# Patient Record
Sex: FEMALE | Race: WHITE | NOT HISPANIC OR LATINO | Employment: FULL TIME | URBAN - METROPOLITAN AREA
[De-identification: names, ages, dates, MRNs, and addresses within clinical notes are randomized per-mention and may not be internally consistent; named-entity substitution may affect disease eponyms.]

---

## 2017-02-16 ENCOUNTER — OFFICE VISIT (OUTPATIENT)
Dept: OBGYN | Facility: CLINIC | Age: 32
End: 2017-02-16
Payer: COMMERCIAL

## 2017-02-16 VITALS
HEART RATE: 88 BPM | DIASTOLIC BLOOD PRESSURE: 84 MMHG | HEIGHT: 66 IN | TEMPERATURE: 99.3 F | SYSTOLIC BLOOD PRESSURE: 127 MMHG | BODY MASS INDEX: 31.98 KG/M2 | WEIGHT: 199 LBS

## 2017-02-16 DIAGNOSIS — Z32.01 POSITIVE PREGNANCY TEST: ICD-10-CM

## 2017-02-16 DIAGNOSIS — E55.9 VITAMIN D DEFICIENCY: ICD-10-CM

## 2017-02-16 DIAGNOSIS — Z34.01 ENCOUNTER FOR SUPERVISION OF NORMAL FIRST PREGNANCY IN FIRST TRIMESTER: Primary | ICD-10-CM

## 2017-02-16 LAB
BETA HCG QUAL IFA URINE: POSITIVE
ERYTHROCYTE [DISTWIDTH] IN BLOOD BY AUTOMATED COUNT: 12.1 % (ref 10–15)
HCT VFR BLD AUTO: 37.7 % (ref 35–47)
HGB BLD-MCNC: 13.2 G/DL (ref 11.7–15.7)
MCH RBC QN AUTO: 30.2 PG (ref 26.5–33)
MCHC RBC AUTO-ENTMCNC: 35 G/DL (ref 31.5–36.5)
MCV RBC AUTO: 86 FL (ref 78–100)
PLATELET # BLD AUTO: 211 10E9/L (ref 150–450)
RBC # BLD AUTO: 4.37 10E12/L (ref 3.8–5.2)
WBC # BLD AUTO: 8.9 10E9/L (ref 4–11)

## 2017-02-16 PROCEDURE — 84703 CHORIONIC GONADOTROPIN ASSAY: CPT | Performed by: OBSTETRICS & GYNECOLOGY

## 2017-02-16 PROCEDURE — 36415 COLL VENOUS BLD VENIPUNCTURE: CPT | Performed by: OBSTETRICS & GYNECOLOGY

## 2017-02-16 PROCEDURE — 85027 COMPLETE CBC AUTOMATED: CPT | Performed by: OBSTETRICS & GYNECOLOGY

## 2017-02-16 PROCEDURE — 86901 BLOOD TYPING SEROLOGIC RH(D): CPT | Performed by: OBSTETRICS & GYNECOLOGY

## 2017-02-16 PROCEDURE — 99207 ZZC FIRST OB VISIT: CPT | Performed by: OBSTETRICS & GYNECOLOGY

## 2017-02-16 PROCEDURE — 86762 RUBELLA ANTIBODY: CPT | Performed by: OBSTETRICS & GYNECOLOGY

## 2017-02-16 PROCEDURE — 87086 URINE CULTURE/COLONY COUNT: CPT | Performed by: OBSTETRICS & GYNECOLOGY

## 2017-02-16 PROCEDURE — 86850 RBC ANTIBODY SCREEN: CPT | Performed by: OBSTETRICS & GYNECOLOGY

## 2017-02-16 PROCEDURE — 87389 HIV-1 AG W/HIV-1&-2 AB AG IA: CPT | Performed by: OBSTETRICS & GYNECOLOGY

## 2017-02-16 PROCEDURE — 82306 VITAMIN D 25 HYDROXY: CPT | Performed by: OBSTETRICS & GYNECOLOGY

## 2017-02-16 PROCEDURE — 86900 BLOOD TYPING SEROLOGIC ABO: CPT | Performed by: OBSTETRICS & GYNECOLOGY

## 2017-02-16 PROCEDURE — 87340 HEPATITIS B SURFACE AG IA: CPT | Performed by: OBSTETRICS & GYNECOLOGY

## 2017-02-16 PROCEDURE — 86780 TREPONEMA PALLIDUM: CPT | Performed by: OBSTETRICS & GYNECOLOGY

## 2017-02-16 NOTE — NURSING NOTE
"Vitals:    02/16/17 1148 02/16/17 1316   BP: (!) 165/94 127/84   BP Location: Left arm Left arm   Patient Position: Chair Chair   Cuff Size: Adult Regular Adult Regular   Pulse: 127 88   Temp: 99.3  F (37.4  C)    TempSrc: Oral    Weight: 199 lb (90.3 kg)    Height: 5' 5.5\" (1.664 m)        "

## 2017-02-16 NOTE — PATIENT INSTRUCTIONS
If you have any questions regarding your visit, Please contact your care team.     [employer] [facility] Services: [phone]    [facility] CLINIC HOURS TELEPHONE NUMBER   MYRNA Esquivel-    Starla Webb-TYLER Tabor-Medical Assistant   Monday-[place]  8:00a.m-4:45 p.m  Wednesday-[place] 8:00a.m-4:45 p.m.  Thursday-[place]  8:00a.m-4:45 p.m.  Friday-[place]  8:00a.m-4:45 p.m. [facility]  [address]  [place], [place] [address] [phone] ask [facility]  [phone] Fax  Imaging [phone]    [facility] Labor and Delivery  [address] [place] [address] [address].  [place], [place] [address] [phone]    [facility]  [address] [place] [address]  [phone] ask [facility]  [phone] Fax  Imaging [phone]     Urgent Care locations:    [place]        [place] Monday-Friday  5 pm - 9 pm  Saturday and Sunday   9 am - 5 pm    Monday-Friday   11 am - 9 pm  Saturday and Sunday   9 am - 5 pm   [phone]       If you need a medication refill, please contact your pharmacy. Please allow 3 business days for your refill to be completed.  As always, Thank you for trusting us with your healthcare needs!

## 2017-02-16 NOTE — NURSING NOTE
"Chief Complaint   Patient presents with     Confirmation Of Pregnancy     Prenatal Care       Initial BP (!) 165/94 (BP Location: Left arm, Patient Position: Chair, Cuff Size: Adult Regular)  Pulse 127  Temp 99.3  F (37.4  C) (Oral)  Ht 5' 5.5\" (1.664 m)  Wt 199 lb (90.3 kg)  LMP 11/23/2016  Breastfeeding? No  BMI 32.61 kg/m2 Estimated body mass index is 32.61 kg/(m^2) as calculated from the following:    Height as of this encounter: 5' 5.5\" (1.664 m).    Weight as of this encounter: 199 lb (90.3 kg).  Medication Reconciliation: complete   Sharona Pedro CMA      "

## 2017-02-16 NOTE — MR AVS SNAPSHOT
After Visit Summary   2/16/2017    Bernice Garcia    MRN: [id number]           Patient Information     Date Of Birth          1985        Visit Information        Provider Department      2/16/2017 11:30 AM Eddie Richards MD [facility]        Today's Diagnoses     Positive pregnancy test    -  1      Care Instructions                                                        If you have any questions regarding your visit, Please contact your care team.     [facility] Services: [phone]    [facility] CLINIC HOURS TELEPHONE NUMBER   MYRNA Esquivel-    Starla Webb-TYLER Tabor-Medical Assistant   Monday-[place]  8:00a.m-4:45 p.m  Wednesday-[place] 8:00a.m-4:45 p.m.  Thursday-[place]  8:00a.m-4:45 p.m.  Friday-[place]  8:00a.m-4:45 p.m. [facility]  [address]  [place], [place] [address] [phone] ask [facility]  [phone] Fax  Imaging [phone]    [facility] Labor and Delivery  [address] [place] [address] Dr.  [place], [place] [address] [phone]    [facility]  [address] [facility] [place] [address] [phone] ask [facility]  [phone] Fax  Imaging [phone]     Urgent Care locations:    [facility] Monday-Friday  5 pm - 9 pm  Saturday and Sunday   9 am - 5 pm    Monday-Friday   11 am - 9 pm  Saturday and Sunday   9 am - 5 pm   [phone]       If you need a medication refill, please contact your pharmacy. Please allow 3 business days for your refill to be completed.  As always, Thank you for trusting us with your healthcare needs!          Follow-ups after your visit        Who to contact     If you have questions or need follow up information about today's clinic visit or your schedule please contact [facility] directly at [phone].  Normal or  "non-critical lab and imaging results will be communicated to you by MyChart, letter or phone within 4 business days after the clinic has received the results. If you do not hear from us within 7 days, please contact the clinic through BrightSc[employer] or phone. If you have a critical or abnormal lab result, we will notify you by phone as soon as possible.  Submit refill requests through Futurefleet or call your pharmacy and they will forward the refill request to us. Please allow 3 business days for your refill to be completed.          Additional Information About Your Visit        Future[employer]et Information     Future[employer]et lets you send messages to your doctor, view your test results, renew your prescriptions, schedule appointments and more. To sign up, go to [email or web address] . Click on \"Log in\" on the left side of the screen, which will take you to the Welcome page. Then click on \"Sign up Now\" on the right side of the page.     You will be asked to enter the access code listed below, as well as some personal information. Please follow the directions to create your username and password.     Your access code is: [email or web address]  Expires: 2017 11:53 AM     Your access code will  in 90 days. If you need help or a new code, please call your [place] clinic or [phone].        Care EveryWhere ID     This is your Care EveryWhere ID. This could be used by other organizations to access your [place] medical records  [id number]        Your Vitals Were     Pulse Temperature Height Last Period Breastfeeding? BMI (Body Mass Index)    127 99.3  F (37.4  C) (Oral) 5' 5.5\" (1.664 m) 2016 (Approximate) No 32.61 kg/m2       Blood Pressure from Last 3 Encounters:   17 (!) 165/94   14 (!) 130/92   14 128/80    Weight from Last 3 Encounters:   17 199 lb (90.3 kg)   14 180 lb (81.6 kg)   14 180 lb (81.6 kg)              We Performed the Following     Beta HCG qual IFA urine        Primary Care " Provider Office Phone # Fax #    Martínez Reynoso PA-C [phone]       [facility] [address] [facility] [address]        Thank you!     Thank you for choosing [facility]  for your care. Our goal is always to provide you with excellent care. Hearing back from our patients is one way we can continue to improve our services. Please take a few minutes to complete the written survey that you may receive in the mail after your visit with us. Thank you!             Your Updated Medication List - Protect others around you: Learn how to safely use, store and throw away your medicines at www.disposemymeds.org.          This list is accurate as of: 2/16/17 11:53 AM.  Always use your most recent med list.                   Brand Name Dispense Instructions for use    doxylamine 25 MG Tabs tablet    UNISOM     Take 12.5 mg by mouth nightly as needed       PRENA1 PO

## 2017-02-17 PROBLEM — Z28.39 RUBELLA NON-IMMUNE STATUS, ANTEPARTUM: Status: ACTIVE | Noted: 2017-02-17

## 2017-02-17 PROBLEM — Z34.00 SUPERVISION OF NORMAL FIRST PREGNANCY: Status: ACTIVE | Noted: 2017-02-17

## 2017-02-17 PROBLEM — O09.899 RUBELLA NON-IMMUNE STATUS, ANTEPARTUM: Status: ACTIVE | Noted: 2017-02-17

## 2017-02-17 PROBLEM — E55.9 VITAMIN D DEFICIENCY: Status: ACTIVE | Noted: 2017-02-17

## 2017-02-17 LAB
ABO + RH BLD: NORMAL
ABO + RH BLD: NORMAL
BACTERIA SPEC CULT: NORMAL
BLD GP AB SCN SERPL QL: NORMAL
BLOOD BANK CMNT PATIENT-IMP: NORMAL
DEPRECATED CALCIDIOL+CALCIFEROL SERPL-MC: 21 UG/L (ref 20–75)
HBV SURFACE AG SERPL QL IA: NONREACTIVE
HIV 1+2 AB+HIV1 P24 AG SERPL QL IA: NORMAL
MICRO REPORT STATUS: NORMAL
RUBV IGG SERPL IA-ACNC: 9 IU/ML
SPECIMEN EXP DATE BLD: NORMAL
SPECIMEN SOURCE: NORMAL
T PALLIDUM IGG+IGM SER QL: NEGATIVE

## 2017-02-17 NOTE — PROGRESS NOTES
Bernice Garcia is a 31 year old year old G 1 P 0 who presents for pregnancy confirmation and an initial obstetrical visit.  Referred by self.    Currently experiencing normal pregnancy symptoms without particular problems including pain, bleeding, marked vomiting or weight loss except: no exceptions.  This was a planned pregnancy.  Here today alone.   Additional concerns: none.     ROS:     Systems reviewed include constitutional; breast;                 cardiac; respiratory; gastrointestinal; genitourinary;                                musculoskeletal; integumentary; psychological;                                hematologic/lymphatic and endocrine.                  These systems were negative for significant symptoms except                 for the following: none and see above HPI.    Past medical, obstetrical, surgical, family and social history reviewed and as noted or updated in chart.     Allergies, meds and supplements are as noted or updated in chart.                    Episode OB dating, demographic and history forms completed or reviewed.    EXAM:  VS as noted. BMI- Body mass index is 32.61 kg/(m^2).    Relatively recent normal general exam- not repeated now.       ASSESSMENT: (Z34.01) Encounter for supervision of normal first pregnancy in first trimester  (primary encounter diagnosis)  Comment:   Plan: CBC WITH PLATELETS, HIV Antigen Antibody Combo,        Rubella Antibody IgG Quantitative, Hepatitis B         surface antigen, Anti Treponema, ABO/RH TYPE         AND SCREEN, Urine Culture Aerobic Bacterial, US        OB <14 Weeks w Transvaginal Single, Vitamin D         Deficiency            (Z32.01) Positive pregnancy test  Comment:   Plan: Beta HCG qual IFA urine, CBC WITH PLATELETS,         HIV Antigen Antibody Combo, Rubella Antibody         IgG Quantitative, Hepatitis B surface antigen,         Anti Treponema, ABO/RH TYPE AND SCREEN, Urine         Culture Aerobic Bacterial            (E55.9)  Vitamin D deficiency  Comment:   Plan: cholecalciferol (VITAMIN D3) 38238 UNITS         capsule               PLAN:  Anticipatory guidance as noted. Symptoms, problems and concerns reviewed. Recommended weight gain discussed. Problem list initiated. Check u/s now. Orders as noted. Observe BP- usually ok. RTC in 4 weeks. First Pap explained and due next along with discuss special screening tests next.    Eddie Richards MD

## 2017-04-05 ENCOUNTER — PRENATAL OFFICE VISIT (OUTPATIENT)
Dept: OBGYN | Facility: CLINIC | Age: 32
End: 2017-04-05
Payer: COMMERCIAL

## 2017-04-05 VITALS
TEMPERATURE: 98.6 F | BODY MASS INDEX: 33.1 KG/M2 | WEIGHT: 202 LBS | DIASTOLIC BLOOD PRESSURE: 89 MMHG | SYSTOLIC BLOOD PRESSURE: 147 MMHG | HEART RATE: 107 BPM

## 2017-04-05 DIAGNOSIS — O09.899 RUBELLA NON-IMMUNE STATUS, ANTEPARTUM: ICD-10-CM

## 2017-04-05 DIAGNOSIS — Z28.39 RUBELLA NON-IMMUNE STATUS, ANTEPARTUM: ICD-10-CM

## 2017-04-05 DIAGNOSIS — E55.9 VITAMIN D DEFICIENCY: ICD-10-CM

## 2017-04-05 DIAGNOSIS — Z34.02 ENCOUNTER FOR SUPERVISION OF NORMAL FIRST PREGNANCY IN SECOND TRIMESTER: Primary | ICD-10-CM

## 2017-04-05 PROCEDURE — 99000 SPECIMEN HANDLING OFFICE-LAB: CPT | Performed by: OBSTETRICS & GYNECOLOGY

## 2017-04-05 PROCEDURE — 99207 ZZC PRENATAL VISIT: CPT | Performed by: OBSTETRICS & GYNECOLOGY

## 2017-04-05 PROCEDURE — 81511 FTL CGEN ABNOR FOUR ANAL: CPT | Mod: 90 | Performed by: OBSTETRICS & GYNECOLOGY

## 2017-04-05 PROCEDURE — 87624 HPV HI-RISK TYP POOLED RSLT: CPT | Performed by: OBSTETRICS & GYNECOLOGY

## 2017-04-05 PROCEDURE — G0145 SCR C/V CYTO,THINLAYER,RESCR: HCPCS | Performed by: OBSTETRICS & GYNECOLOGY

## 2017-04-05 PROCEDURE — 36415 COLL VENOUS BLD VENIPUNCTURE: CPT | Performed by: OBSTETRICS & GYNECOLOGY

## 2017-04-05 PROCEDURE — 82306 VITAMIN D 25 HYDROXY: CPT | Performed by: OBSTETRICS & GYNECOLOGY

## 2017-04-05 NOTE — LETTER
April 17, 2017    Bernice Garcia  [address] [place] [address]    Dear Bernice,  We are happy to inform you that your PAP smear result from 4/5/17 is normal.  We are now able to do a follow up test on PAP smears. The DNA test is for HPV (Human Papilloma Virus). Cervical cancer is closely linked with certain types of HPV. Your result showed no evidence of high risk HPV.  Therefore we recommend you return in 3 years for your next pap smear.  You will still need to return to the clinic every year for an annual exam and other preventive tests.  Please contact the clinic at [phone] with any questions.  Sincerely,    Eddie Richards MD/james

## 2017-04-05 NOTE — MR AVS SNAPSHOT
After Visit Summary   4/5/2017    Bernice Garcia    MRN: [id number]           Patient Information     Date Of Birth          1985        Visit Information        Provider Department      4/5/2017 1:45 PM Eddie Richards MD [facility]        Today's Diagnoses     Rubella non-immune status, antepartum        Encounter for supervision of normal first pregnancy in second trimester          Care Instructions                                                        If you have any questions regarding your visit, Please contact your care team.     [facility] Access Services: [phone]    [facility] CLINIC HOURS TELEPHONE NUMBER   Eddie Richards M.D.      [place]-    Starla Webb-TYLER Curryi-Medical Assistant   Monday-[place]  8:00a.m-4:45 p.m  Wednesday-[place] 8:00a.m-4:45 p.m.  Thursday-[place]  8:00a.m-4:45 p.m.  Friday-[place]  8:00a.m-4:45 p.m. [facility]  [address] [place] [address] [place] [address]  [phone] ask for [facility]  [phone] Fax  Imaging [phone]    [facility] Labor and Delivery  [address] [place] [address] [place] [address]  [phone]    [facility]  [address] [place] [address]  [phone] ask for [facility]  [phone] Fax  Imaging [phone]     Urgent Care locations:    [facility] Monday-Friday  5 pm - 9 pm  Saturday and Sunday   9 am - 5 pm    Monday-Friday   11 am - 9 pm  Saturday and Sunday   9 am - 5 pm   [phone]       If you need a medication refill, please contact your pharmacy. Please allow 3 business days for your refill to be completed.  As always, Thank you for trusting us with your healthcare needs!          Follow-ups after your visit        Who to contact     If you have questions or need follow up information about today's clinic visit or your schedule please  "contact [facility] directly at [phone].  Normal or non-critical lab and imaging results will be communicated to you by MyChart, letter or phone within 4 business days after the clinic has received the results. If you do not hear from us within 7 days, please contact the clinic through MyChart or phone. If you have a critical or abnormal lab result, we will notify you by phone as soon as possible.  Submit refill requests through CloudStrategies or call your pharmacy and they will forward the refill request to us. Please allow 3 business days for your refill to be completed.          Additional Information About Your Visit        [employer] Information     CloudStrategies lets you send messages to your doctor, view your test results, renew your prescriptions, schedule appointments and more. To sign up, go to [email or web address] . Click on \"Log in\" on the left side of the screen, which will take you to the Welcome page. Then click on \"Sign up Now\" on the right side of the page.     You will be asked to enter the access code listed below, as well as some personal information. Please follow the directions to create your username and password.     Your access code is: [email or web address]  Expires: 2017 12:53 PM     Your access code will  in 90 days. If you need help or a new code, please call your [place] clinic or [phone].        Care EveryWhere ID     This is your Care EveryWhere ID. This could be used by other organizations to access your [place] medical records  [id number]        Your Vitals Were     Pulse Temperature Last Period Breastfeeding? BMI (Body Mass Index)       107 98.6  F (37  C) (Oral) 2016 (Exact Date) No 33.1 kg/m2        Blood Pressure from Last 3 Encounters:   17 147/89   17 127/84   14 (!) 130/92    Weight from Last 3 Encounters:   17 202 lb (91.6 kg)   17 199 lb (90.3 kg)   14 180 lb (81.6 kg)              Today, you had the following  "    No orders found for display       Primary Care Provider Office Phone # Fax #    Martínez Reynoso PA-C [phone]       [facility] [address] [facility] [address]        Thank you!     Thank you for choosing [facility]  for your care. Our goal is always to provide you with excellent care. Hearing back from our patients is one way we can continue to improve our services. Please take a few minutes to complete the written survey that you may receive in the mail after your visit with us. Thank you!             Your Updated Medication List - Protect others around you: Learn how to safely use, store and throw away your medicines at www.disposemymeds.org.          This list is accurate as of: 4/5/17  4:21 PM.  Always use your most recent med list.                   Brand Name Dispense Instructions for use    cholecalciferol 00788 UNITS capsule    VITAMIN D3    8 capsule    Take 1 capsule (50,000 Units) by mouth every 7 days       doxylamine 25 MG Tabs tablet    UNISOM     Take 12.5 mg by mouth nightly as needed       PRENA1 PO

## 2017-04-05 NOTE — NURSING NOTE
"Chief Complaint   Patient presents with     Prenatal Care     OBV 18w6d       Initial /89 (BP Location: Left arm, Patient Position: Chair, Cuff Size: Adult Regular)  Pulse 107  Temp 98.6  F (37  C) (Oral)  Wt 202 lb (91.6 kg)  LMP 11/23/2016 (Exact Date)  Breastfeeding? No  BMI 33.1 kg/m2 Estimated body mass index is 33.1 kg/(m^2) as calculated from the following:    Height as of 2/16/17: 5' 5.5\" (1.664 m).    Weight as of this encounter: 202 lb (91.6 kg).  Medication Reconciliation: complete   Sharona Pedro CMA      "

## 2017-04-05 NOTE — PATIENT INSTRUCTIONS
If you have any questions regarding your visit, Please contact your care team.     [employer] [facility] Services: [phone]    [facility] CLINIC HOURS TELEPHONE NUMBER   MYRNA Esquivel-    Starla Webb-TYLER Tabor-Medical Assistant   Monday-[place]  8:00a.m-4:45 p.m  Wednesday-[place] 8:00a.m-4:45 p.m.  Thursday-[place]  8:00a.m-4:45 p.m.  Friday-[place]  8:00a.m-4:45 p.m. [facility]  [address]  [place], [place] [address] [phone] ask [facility]  [phone] Fax  Imaging [phone]    [facility] Labor and Delivery  [address] [place] [address] Dr.  [place], [place] [address] [phone]    [facility]  [address] [place] [address]  [phone] ask [facility]  [phone] Fax  Imaging [phone]     Urgent Care locations:    [place]        [place] Monday-Friday  5 pm - 9 pm  Saturday and Sunday   9 am - 5 pm    Monday-Friday   11 am - 9 pm  Saturday and Sunday   9 am - 5 pm   [phone]       If you need a medication refill, please contact your pharmacy. Please allow 3 business days for your refill to be completed.  As always, Thank you for trusting us with your healthcare needs!

## 2017-04-06 LAB — DEPRECATED CALCIDIOL+CALCIFEROL SERPL-MC: 53 UG/L (ref 20–75)

## 2017-04-06 NOTE — PROGRESS NOTES
Doing well. No signif signs, symptoms or concerns except GERD. Decided against doing an early u/s here due to uncertainty on her insurance coverage but did have a non-medical u/s elsewhere and reports a boy. Taking Vit D Rx.   Pap/HRHPV obtained and exam neg. BP mildly elevated today too.   Discussed special diagnostic and screening tests and plans (y = yes, n = no/declined): quad scr = y, survey u/s = y, Level 2 survey u/s with MFM = n, CF carrier scr = n, hemoglobinopathy or thalassemia scr= n, SMA, fragile X  and other genetic scr= n, 1st trimester scr with NT and later AFP or with cell free fetal DNA and later AFP = n, genetic amnio/CVS = n.  Advice as per Checklist update. Discussed condition, tests and care plan including RBA. Problem list updated. Survey u/s next.  Eddie Richards MD

## 2017-04-07 LAB
# FETUSES US: NORMAL
AFP ADJ MOM AMN: 0.86
AFP SERPL-MCNC: 50 NG/ML
AGE - REPORTED: 31.1
COPATH REPORT: NORMAL
DATING METHOD: NORMAL
DIABETIC AT CONCEPTION: NO
FAMILY MEMBER DISEASES HX: NO
FAMILY MEMBER DISEASES HX: NO
GA METHOD: NORMAL
GA: 21.43 WK
HCG MOM SERPL: 0.95
HCG SERPL-ACNC: NORMAL M[IU]/ML
HX OF HEREDITARY DISORDERS: NO
IDDM PATIENT QL: NO
INHIBIN A MOM SERPL: 0.67
INHIBIN A SERPL-MCNC: 123
INTEGRATED SCN PATIENT-IMP: NORMAL
LMP START DATE: NORMAL
PAP: NORMAL
PATHOLOGY STUDY: NORMAL
PREV HX CHROMOSOME ABNORMALITY: NO
SPECIMEN DRAWN SERPL: NORMAL
TWINS: NORMAL
U ESTRIOL MOM SERPL: 0.9
U ESTRIOL SERPL-MCNC: 2.14 NG/ML

## 2017-04-11 LAB
FINAL DIAGNOSIS: NORMAL
HPV HR 12 DNA CVX QL NAA+PROBE: NEGATIVE
HPV16 DNA SPEC QL NAA+PROBE: NEGATIVE
HPV18 DNA SPEC QL NAA+PROBE: NEGATIVE
SPECIMEN DESCRIPTION: NORMAL

## 2017-04-13 ENCOUNTER — RADIANT APPOINTMENT (OUTPATIENT)
Dept: ULTRASOUND IMAGING | Facility: CLINIC | Age: 32
End: 2017-04-13
Attending: OBSTETRICS & GYNECOLOGY
Payer: COMMERCIAL

## 2017-04-13 DIAGNOSIS — Z34.02 ENCOUNTER FOR SUPERVISION OF NORMAL FIRST PREGNANCY IN SECOND TRIMESTER: ICD-10-CM

## 2017-04-13 PROCEDURE — 76805 OB US >/= 14 WKS SNGL FETUS: CPT | Performed by: RADIOLOGY

## 2017-04-18 ENCOUNTER — TELEPHONE (OUTPATIENT)
Dept: OBGYN | Facility: CLINIC | Age: 32
End: 2017-04-18

## 2017-04-18 NOTE — TELEPHONE ENCOUNTER
[facility] Call Center    Phone Message    Name of Caller: Bernice    Phone Number: Home number on file [phone] (home)    Best time to return call: any    May a detailed message be left on voicemail: yes    Relation to patient: Self    Reason for Call: Other: Patient is calling to speak with clinic about pap results. Please advise.     Action Taken: Message routed to:  [facility] p [phone]

## 2017-04-19 NOTE — TELEPHONE ENCOUNTER
Notes Recorded by Maria L Steele RN on 4/14/2017 at 8:44 AM  Please send normal pap letter (49354) for pap in 3 year(s). HM and PL updated.   Thanks!  Maria L Steele RN, BSN - Pap Tracking    Will route to TYLER Lisa for review & orders. Tracey Nye RN, BAN

## 2017-04-19 NOTE — TELEPHONE ENCOUNTER
Left message for patient to return my call to [phone].    Maria L Steele, JEAN-CLAUDEN, RN  Pap Tracking Nurse

## 2017-05-11 ENCOUNTER — PRENATAL OFFICE VISIT (OUTPATIENT)
Dept: OBGYN | Facility: CLINIC | Age: 32
End: 2017-05-11
Payer: COMMERCIAL

## 2017-05-11 VITALS
TEMPERATURE: 99.2 F | DIASTOLIC BLOOD PRESSURE: 91 MMHG | WEIGHT: 206 LBS | SYSTOLIC BLOOD PRESSURE: 134 MMHG | HEART RATE: 98 BPM | BODY MASS INDEX: 33.76 KG/M2

## 2017-05-11 DIAGNOSIS — Z34.02 ENCOUNTER FOR SUPERVISION OF NORMAL FIRST PREGNANCY IN SECOND TRIMESTER: ICD-10-CM

## 2017-05-11 DIAGNOSIS — O09.899 RUBELLA NON-IMMUNE STATUS, ANTEPARTUM: ICD-10-CM

## 2017-05-11 DIAGNOSIS — Z28.39 RUBELLA NON-IMMUNE STATUS, ANTEPARTUM: ICD-10-CM

## 2017-05-11 PROBLEM — E55.9 VITAMIN D DEFICIENCY: Status: RESOLVED | Noted: 2017-02-17 | Resolved: 2017-05-11

## 2017-05-11 LAB
GLUCOSE 1H P 50 G GLC PO SERPL-MCNC: 110 MG/DL (ref 60–129)
HGB BLD-MCNC: 11.5 G/DL (ref 11.7–15.7)

## 2017-05-11 PROCEDURE — 99207 ZZC PRENATAL VISIT: CPT | Performed by: OBSTETRICS & GYNECOLOGY

## 2017-05-11 PROCEDURE — 82950 GLUCOSE TEST: CPT | Performed by: OBSTETRICS & GYNECOLOGY

## 2017-05-11 PROCEDURE — 36415 COLL VENOUS BLD VENIPUNCTURE: CPT | Performed by: OBSTETRICS & GYNECOLOGY

## 2017-05-11 PROCEDURE — 00000218 ZZHCL STATISTIC OBHBG - HEMOGLOBIN: Performed by: OBSTETRICS & GYNECOLOGY

## 2017-05-11 NOTE — Clinical Note
I reviewed quad screen report again from lab and the EROS that they used was incorrectly listed as 8/13 instead of 8/31. Please have Lab re-calculate using the 8/31 EROS that is based on known conception date and amend the report.

## 2017-05-11 NOTE — PROGRESS NOTES
No signif signs, symptoms or concerns. Advice appropriate to gestational age reviewed including pertinent Checklist items. Discussed condition, tests and care plan including RBA. Problem list updated. Follow-up u/s to complete survey next.  A/P:  Bernice was seen today for prenatal care.    Diagnoses and all orders for this visit:    Rubella non-immune status, antepartum    Encounter for supervision of normal first pregnancy in second trimester  -     Glucose tolerance gest screen 1 hour  -     OB hemoglobin  -     US OB Limited One Or More Fetuses; Future        Eddie Richards MD

## 2017-05-11 NOTE — PATIENT INSTRUCTIONS
If you have any questions regarding your visit, Please contact your care team.     [facility] Services: [phone]    [facility] CLINIC HOURS TELEPHONE NUMBER   MYRNA Esquivel-    Starla Webb-TYLER Tabor-Medical Assistant   Monday-[place]  8:00a.m-4:45 p.m  Wednesday-[place] 8:00a.m-4:45 p.m.  Thursday-[place]  8:00a.m-4:45 p.m.  Friday-[place]  8:00a.m-4:45 p.m. [facility]  [address]  [place], [place] [address] [phone] ask [facility]  [phone] Fax  Imaging [id number]-[phone]    [facility] Labor and Delivery  [address] [place] [address] Dr.  [place], [place] [address] [phone]    [facility]  [address] [place] [address]  [phone] ask [facility]  [phone] Fax  Imaging [phone]     Urgent Care locations:    [place]        [place] Monday-Friday  5 pm - 9 pm  Saturday and Sunday   9 am - 5 pm    Monday-Friday   11 am - 9 pm  Saturday and Sunday   9 am - 5 pm   [phone]       If you need a medication refill, please contact your pharmacy. Please allow 3 business days for your refill to be completed.  As always, Thank you for trusting us with your healthcare needs!

## 2017-05-11 NOTE — NURSING NOTE
"Chief Complaint   Patient presents with     Prenatal Care     OBV  24 weeks       Initial BP (!) 134/91 (BP Location: Left arm, Patient Position: Chair, Cuff Size: Adult Regular)  Pulse 98  Temp 99.2  F (37.3  C) (Oral)  Wt 206 lb (93.4 kg)  LMP 11/23/2016 (Exact Date)  Breastfeeding? No  BMI 33.76 kg/m2 Estimated body mass index is 33.76 kg/(m^2) as calculated from the following:    Height as of 2/16/17: 5' 5.5\" (1.664 m).    Weight as of this encounter: 206 lb (93.4 kg).  Medication Reconciliation: complete   Sharona Pedro CMA      "

## 2017-05-11 NOTE — MR AVS SNAPSHOT
After Visit Summary   5/11/2017    Bernice Garcia    MRN: [id number]           Patient Information     Date Of Birth          1985        Visit Information        Provider Department      5/11/2017 4:30 PM Eddie Richards MD [facility]        Today's Diagnoses     Rubella non-immune status, antepartum        Encounter for supervision of normal first pregnancy in second trimester          Care Instructions                                                        If you have any questions regarding your visit, Please contact your care team.     [facility] Access Services: [phone]    [facility] CLINIC HOURS TELEPHONE NUMBER   Eddie Richards M.D.      Hellen-    Starla Webb-TYLER Curryi-Medical Assistant   Monday-[place]  8:00a.m-4:45 p.m  Wednesday-[place] 8:00a.m-4:45 p.m.  Thursday-[place]  8:00a.m-4:45 p.m.  Friday-[place]  8:00a.m-4:45 p.m. [facility]  [address] [place] [address] [place] [address]  [phone] ask for [facility]  [phone] Fax  Imaging [phone]    [facility]  [address] [place] [address] [place] [address]  [phone]    [facility]  [address] [place] [address]  [phone] ask for [facility]  [phone] Fax  Imaging [phone]     Urgent Care locations:    [facility] Monday-Friday  5 pm - 9 pm  Saturday and Sunday   9 am - 5 pm    Monday-Friday   11 am - 9 pm  Saturday and Sunday   9 am - 5 pm   [phone]       If you need a medication refill, please contact your pharmacy. Please allow 3 business days for your refill to be completed.  As always, Thank you for trusting us with your healthcare needs!          Follow-ups after your visit        Who to contact     If you have questions or need follow up information about today's clinic visit or your schedule  "please contact [facility] directly at [phone].  Normal or non-critical lab and imaging results will be communicated to you by MyChart, letter or phone within 4 business days after the clinic has received the results. If you do not hear from us within 7 days, please contact the clinic through MyChart or phone. If you have a critical or abnormal lab result, we will notify you by phone as soon as possible.  Submit refill requests through frestyl or call your pharmacy and they will forward the refill request to us. Please allow 3 business days for your refill to be completed.          Additional Information About Your Visit        angelcamharSympoz Information     frestyl lets you send messages to your doctor, view your test results, renew your prescriptions, schedule appointments and more. To sign up, go to [email or web address] . Click on \"Log in\" on the left side of the screen, which will take you to the Welcome page. Then click on \"Sign up Now\" on the right side of the page.     You will be asked to enter the access code listed below, as well as some personal information. Please follow the directions to create your username and password.     Your access code is: [email or web address]  Expires: 2017 12:53 PM     Your access code will  in 90 days. If you need help or a new code, please call your [place] clinic or [phone].        Care EveryWhere ID     This is your Care EveryWhere ID. This could be used by other organizations to access your [place] medical records  [id number]        Your Vitals Were     Pulse Temperature Last Period Breastfeeding? BMI (Body Mass Index)       98 99.2  F (37.3  C) (Oral) 2016 (Exact Date) No 33.76 kg/m2        Blood Pressure from Last 3 Encounters:   17 (!) 134/91   17 147/89   17 127/84    Weight from Last 3 Encounters:   17 206 lb (93.4 kg)   17 202 lb (91.6 kg)   17 199 lb (90.3 kg)              Today, you had the " following     No orders found for display       Primary Care Provider Office Phone # Fax #    Martínez Reynoso PA-C [phone]       [facility] [address] [facility] [address]        Thank you!     Thank you for choosing [facility]  for your care. Our goal is always to provide you with excellent care. Hearing back from our patients is one way we can continue to improve our services. Please take a few minutes to complete the written survey that you may receive in the mail after your visit with us. Thank you!             Your Updated Medication List - Protect others around you: Learn how to safely use, store and throw away your medicines at www.disposemymeds.org.          This list is accurate as of: 5/11/17  4:41 PM.  Always use your most recent med list.                   Brand Name Dispense Instructions for use    cholecalciferol 41059 UNITS capsule    VITAMIN D3    8 capsule    Take 1 capsule (50,000 Units) by mouth every 7 days       doxylamine 25 MG Tabs tablet    UNISOM     Take 12.5 mg by mouth nightly as needed       PRENA1 PO

## 2017-05-15 ENCOUNTER — RADIANT APPOINTMENT (OUTPATIENT)
Dept: ULTRASOUND IMAGING | Facility: CLINIC | Age: 32
End: 2017-05-15
Attending: OBSTETRICS & GYNECOLOGY
Payer: COMMERCIAL

## 2017-05-15 DIAGNOSIS — Z34.02 ENCOUNTER FOR SUPERVISION OF NORMAL FIRST PREGNANCY IN SECOND TRIMESTER: ICD-10-CM

## 2017-05-15 PROCEDURE — 76815 OB US LIMITED FETUS(S): CPT | Performed by: RADIOLOGY

## 2017-06-09 ENCOUNTER — PRENATAL OFFICE VISIT (OUTPATIENT)
Dept: OBGYN | Facility: CLINIC | Age: 32
End: 2017-06-09
Payer: COMMERCIAL

## 2017-06-09 VITALS
OXYGEN SATURATION: 100 % | DIASTOLIC BLOOD PRESSURE: 82 MMHG | HEIGHT: 66 IN | TEMPERATURE: 98.5 F | BODY MASS INDEX: 33.91 KG/M2 | HEART RATE: 93 BPM | SYSTOLIC BLOOD PRESSURE: 133 MMHG | WEIGHT: 211 LBS

## 2017-06-09 DIAGNOSIS — O09.899 RUBELLA NON-IMMUNE STATUS, ANTEPARTUM: ICD-10-CM

## 2017-06-09 DIAGNOSIS — Z34.03 ENCOUNTER FOR SUPERVISION OF NORMAL FIRST PREGNANCY IN THIRD TRIMESTER: Primary | ICD-10-CM

## 2017-06-09 DIAGNOSIS — Z28.39 RUBELLA NON-IMMUNE STATUS, ANTEPARTUM: ICD-10-CM

## 2017-06-09 PROCEDURE — 99207 ZZC PRENATAL VISIT: CPT | Performed by: OBSTETRICS & GYNECOLOGY

## 2017-06-09 ASSESSMENT — PAIN SCALES - GENERAL: PAINLEVEL: NO PAIN (0)

## 2017-06-09 NOTE — NURSING NOTE
"Chief Complaint   Patient presents with     Prenatal Care       Initial /82 (BP Location: Left arm, Patient Position: Chair, Cuff Size: Adult Large)  Pulse 93  Temp 98.5  F (36.9  C) (Oral)  Ht 5' 5.5\" (1.664 m)  Wt 211 lb (95.7 kg)  LMP 11/23/2016 (Exact Date)  SpO2 100%  BMI 34.58 kg/m2 Estimated body mass index is 34.58 kg/(m^2) as calculated from the following:    Height as of this encounter: 5' 5.5\" (1.664 m).    Weight as of this encounter: 211 lb (95.7 kg).  Medication Reconciliation: alea Castellon MA      "

## 2017-06-09 NOTE — MR AVS SNAPSHOT
"              After Visit Summary   6/9/2017    Bernice Garcia    MRN: [id number]           Patient Information     Date Of Birth          1985        Visit Information        Provider Department      6/9/2017 4:30 PM Eddie Richarsd MD [facility]        Today's Diagnoses     Rubella non-immune status, antepartum           Follow-ups after your visit        Your next 10 appointments already scheduled     Jun 22, 2017 11:15 PM CDT   [facility]    [address] [facility] [id number] [phone]            Jul 06, 2017  4:30 PM CDT   ESTABL[facility] PRENATAL [facility] MD   [facility]    [address] [facility] [id number] [phone]              Who to contact     If you have questions or need follow up information about today's clinic visit or your schedule please contact [facility] directly at [phone].  Normal or non-critical lab and imaging results will be communicated to you by Maxp[employer]aa[employer] Softwarehart, letter or phone within 4 business days after the clinic has received the results. If you do not hear from us within 7 days, please contact the clinic through Starbatest or phone. If you have a critical or abnormal lab result, we will notify you by phone as soon as possible.  Submit refill requests through [employer] [place] [employer] or call your pharmacy and they will forward the refill request to us. Please allow 3 business days for your refill to be completed.          Additional Information About Your Visit        [employer] Information     [employer] [place] [employer] lets you send messages to your doctor, view your test results, renew your prescriptions, schedule appointments and more. To sign up, go to [email or web address] . Click on \"Log in\" on the left side of the screen, which will take you to the " "Welcome page. Then click on \"Sign up Now\" on the right side of the page.     You will be asked to enter the access code listed below, as well as some personal information. Please follow the directions to create your username and password.     Your access code is: [email or web address]  Expires: 2017  4:32 PM     Your access code will  in 90 days. If you need help or a new code, please call your [facility] or [phone].        Care EveryWhere ID     This is your Care EveryWhere ID. This could be used by other organizations to access your [place] medical records  [id number]        Your Vitals Were     Pulse Temperature Height Last Period Pulse Oximetry BMI (Body Mass Index)    93 98.5  F (36.9  C) (Oral) 5' 5.5\" (1.664 m) 2016 (Exact Date) 100% 34.58 kg/m2       Blood Pressure from Last 3 Encounters:   17 133/82   17 (!) 134/91   17 147/89    Weight from Last 3 Encounters:   17 211 lb (95.7 kg)   17 206 lb (93.4 kg)   17 202 lb (91.6 kg)              Today, you had the following     No orders found for display       Primary Care Provider Office Phone # Fax #    Martínez Reynoso PA-C [phone]       [facility] [address] [facility] [address]        Thank you!     Thank you for choosing [facility]  for your care. Our goal is always to provide you with excellent care. Hearing back from our patients is one way we can continue to improve our services. Please take a few minutes to complete the written survey that you may receive in the mail after your visit with us. Thank you!             Your Updated Medication List - Protect others around you: Learn how to safely use, store and throw away your medicines at www.disposemymeds.org.          This list is accurate as of: 17  4:32 PM.  Always use your most recent med list.                   Brand Name Dispense Instructions for use    doxylamine 25 MG Tabs " tablet    UNISOM     Take 12.5 mg by mouth nightly as needed       PRENA1 PO

## 2017-06-10 NOTE — PROGRESS NOTES
No signif signs, symptoms or concerns. Advice appropriate to gestational age reviewed including pertinent Checklist items. Discussed condition, tests and care plan including RBA. Problem list updated.   A/P:  Bernice was seen today for prenatal care.    Diagnoses and all orders for this visit:    Encounter for supervision of normal first pregnancy in third trimester    Rubella non-immune status, antepartum        Eddie Richards MD

## 2017-06-21 ENCOUNTER — TELEPHONE (OUTPATIENT)
Dept: OBGYN | Facility: CLINIC | Age: 32
End: 2017-06-21

## 2017-06-21 ENCOUNTER — PRENATAL OFFICE VISIT (OUTPATIENT)
Dept: OBGYN | Facility: CLINIC | Age: 32
End: 2017-06-21
Payer: COMMERCIAL

## 2017-06-21 VITALS
BODY MASS INDEX: 34.58 KG/M2 | WEIGHT: 211 LBS | DIASTOLIC BLOOD PRESSURE: 89 MMHG | TEMPERATURE: 98.5 F | SYSTOLIC BLOOD PRESSURE: 145 MMHG | HEART RATE: 101 BPM

## 2017-06-21 DIAGNOSIS — O09.899 RUBELLA NON-IMMUNE STATUS, ANTEPARTUM: ICD-10-CM

## 2017-06-21 DIAGNOSIS — Z28.39 RUBELLA NON-IMMUNE STATUS, ANTEPARTUM: ICD-10-CM

## 2017-06-21 DIAGNOSIS — Z34.03 ENCOUNTER FOR SUPERVISION OF NORMAL FIRST PREGNANCY IN THIRD TRIMESTER: ICD-10-CM

## 2017-06-21 PROCEDURE — 99207 ZZC PRENATAL VISIT: CPT | Performed by: OBSTETRICS & GYNECOLOGY

## 2017-06-21 NOTE — PATIENT INSTRUCTIONS
If you have any questions regarding your visit, Please contact your care team.     [facility] Access Services: [phone]    [facility] CLINIC HOURS TELEPHONE NUMBER   MYRNA Esquivel-    Starla Webb-TYLER Tabor-Medical Assistant   Monday-[place]  8:00a.m-4:45 p.m  Wednesday-[place] 8:00a.m-4:45 p.m.  Thursday-[place]  8:00a.m-4:45 p.m.  Friday-[place]  8:00a.m-4:45 p.m. [facility]  [address]  [place], [place] [address] [phone] ask [facility]  [phone] Fax  Imaging [phone]    [facility] Labor and Delivery  [address] [place] [address] Dr.  [place], [place] [address] [phone]    [facility]  [address] [place] [address]  [phone] ask [facility]  [phone] Fax  Imaging [phone]     Urgent Care locations:    [place]        [place] Monday-Friday  5 pm - 9 pm  Saturday and Sunday   9 am - 5 pm    Monday-Friday   11 am - 9 pm  Saturday and Sunday   9 am - 5 pm   [phone]       If you need a medication refill, please contact your pharmacy. Please allow 3 business days for your refill to be completed.  As always, Thank you for trusting us with your healthcare needs!

## 2017-06-21 NOTE — NURSING NOTE
"Chief Complaint   Patient presents with     Prenatal Care     OBV 29w6d       Initial /89 (BP Location: Left arm, Patient Position: Chair, Cuff Size: Adult Regular)  Pulse 101  Temp 98.5  F (36.9  C) (Oral)  Wt 211 lb (95.7 kg)  LMP 11/23/2016 (Exact Date)  Breastfeeding? No  BMI 34.58 kg/m2 Estimated body mass index is 34.58 kg/(m^2) as calculated from the following:    Height as of 6/9/17: 5' 5.5\" (1.664 m).    Weight as of this encounter: 211 lb (95.7 kg).  Medication Reconciliation: complete   Sharona Pedro CMA      "

## 2017-06-21 NOTE — TELEPHONE ENCOUNTER
"Phone call from patient. Patient stated she is at work and \"just went to the bathroom\" and noted blood in the toilet water \"like a light period\" and then wiped again and noted blood on the toilet paper. Patient denied abdominal cramping/pain. Then changed report stating she was having some and \"maybe a little because I am anxious.\" Patient reported has had ligament pain and lower pelvic pain/pressure. She notes baby sits on her bladder \"a lot so some times it hurts when I walk.\" Patient denied dysuria. Patient reported some times feeling like she is not emptying her bladder completely. Patient reported last IC was \"a few days ago so not from that.\" Recommended she be seen today. Patient agreed. Scheduled with Dr. Richards at 1130 today. Patient stated she may be 5 mins late but will head to appt now. Patient appreciative of assistance. Updated Omni, CMA. Tracey Nye RN, BAN    "

## 2017-06-21 NOTE — MR AVS SNAPSHOT
After Visit Summary   6/21/2017    Bernice Garcia    MRN: [id number]           Patient Information     Date Of Birth          1985        Visit Information        Provider Department      6/21/2017 11:30 AM Eddie Richards MD [facility]        Today's Diagnoses     Rubella non-immune status, antepartum        Encounter for supervision of normal first pregnancy in third trimester          Care Instructions                                                        If you have any questions regarding your visit, Please contact your care team.     [facility] Access Services: [phone]    [facility] HOURS TELEPHONE NUMBER   Eddie Richards M.D.      Hellen-    Starla Webb-RN    Antoinettei-Medical Assistant   Monday-[place]  8:00a.m-4:45 p.m  Wednesday-[place] 8:00a.m-4:45 p.m.  Thursday-[place]  8:00a.m-4:45 p.m.  Friday-[place]  8:00a.m-4:45 p.m. [facility]  [address] [place] [address] [place] [address]  [phone] ask [facility]  [phone] Fax  Imaging [phone]    [facility] Labor and Delivery  [address] [place] [address] [place] [address]  [phone]    [facility]  [address] [place] [address]  [phone] ask [facility]  [phone] Fax  Imaging [phone]     Urgent Care locations:    [facility] Monday-Friday  5 pm - 9 pm  Saturday and Sunday   9 am - 5 pm    Monday-Friday   11 am - 9 pm  Saturday and Sunday   9 am - 5 pm   [phone]       If you need a medication refill, please contact your pharmacy. Please allow 3 business days for your refill to be completed.  As always, Thank you for trusting us with your healthcare needs!            Follow-ups after your visit        Your next 10 appointments already scheduled     Jun 22, 2017 11:15 AM CDT   ESTABLISHED PRENATAL with Eddie Shaver  "MD Susie   [facility]    [address] [facility] [id number] [phone]            2017  4:30 PM CDT   ESTABLISHED PRENATAL with Eddie Richards MD   [facility]    [address] [facility] [id number] [phone]              Who to contact     If you have questions or need follow up information about today's clinic visit or your schedule please contact [facility] directly at [phone].  Normal or non-critical lab and imaging results will be communicated to you by MyChart, letter or phone within 4 business days after the clinic has received the results. If you do not hear from us within 7 days, please contact the clinic through MyChart or phone. If you have a critical or abnormal lab result, we will notify you by phone as soon as possible.  Submit refill requests through Linktone or call your pharmacy and they will forward the refill request to us. Please allow 3 business days for your refill to be completed.          Additional Information About Your Visit        MyChart Information     Linktone lets you send messages to your doctor, view your test results, renew your prescriptions, schedule appointments and more. To sign up, go to [email or web address] . Click on \"Log in\" on the left side of the screen, which will take you to the Welcome page. Then click on \"Sign up Now\" on the right side of the page.     You will be asked to enter the access code listed below, as well as some personal information. Please follow the directions to create your username and password.     Your access code is: [email or web address]  Expires: 2017  4:32 PM     Your access code will  in 90 days. If you need help or a new code, please call your [place] clinic or [phone].        Care EveryWhere ID     This is your Care EveryWhere ID. This could be used by " other organizations to access your [place] medical records  [id number]        Your Vitals Were     Pulse Temperature Last Period Breastfeeding? BMI (Body Mass Index)       101 98.5  F (36.9  C) (Oral) 11/23/2016 (Exact Date) No 34.58 kg/m2        Blood Pressure from Last 3 Encounters:   06/21/17 145/89   06/09/17 133/82   05/11/17 (!) 134/91    Weight from Last 3 Encounters:   06/21/17 211 lb (95.7 kg)   06/09/17 211 lb (95.7 kg)   05/11/17 206 lb (93.4 kg)              Today, you had the following     No orders found for display       Primary Care Provider Office Phone # Fax #    Martínez Reynoso PA-C [phone]       [address] [address]        Equal Access to Services     [facility] : Hadii aad ku hadasho Soomaali, waaxda luqadaha, qaybta kaalmada adeegyada, waxay yarielin hayaan karel tinoco . So [facility] [phone].    ATENCIÓN: Si habla español, tiene a newsome disposición servicios gratuitos de asistencia lingüística. Greg al [phone].    We comply with applicable federal civil rights laws and [place] laws. We do not discriminate on the basis of race, color, national origin, age, disability sex, sexual orientation or gender identity.            Thank you!     Thank you for choosing [facility]  for your care. Our goal is always to provide you with excellent care. Hearing back from our patients is one way we can continue to improve our services. Please take a few minutes to complete the written survey that you may receive in the mail after your visit with us. Thank you!             Your Updated Medication List - Protect others around you: Learn how to safely use, store and throw away your medicines at www.disposemymeds.org.          This list is accurate as of: 6/21/17 11:44 AM.  Always use your most recent med list.                   Brand Name Dispense Instructions for use Diagnosis    doxylamine 25 MG Tabs tablet     UNISOM     Take 12.5 mg by mouth nightly as needed        PRENA1 PO

## 2017-07-06 ENCOUNTER — PRENATAL OFFICE VISIT (OUTPATIENT)
Dept: OBGYN | Facility: CLINIC | Age: 32
End: 2017-07-06
Payer: COMMERCIAL

## 2017-07-06 VITALS
HEART RATE: 89 BPM | WEIGHT: 215 LBS | OXYGEN SATURATION: 98 % | DIASTOLIC BLOOD PRESSURE: 83 MMHG | BODY MASS INDEX: 35.23 KG/M2 | SYSTOLIC BLOOD PRESSURE: 131 MMHG

## 2017-07-06 DIAGNOSIS — O09.899 RUBELLA NON-IMMUNE STATUS, ANTEPARTUM: ICD-10-CM

## 2017-07-06 DIAGNOSIS — Z28.39 RUBELLA NON-IMMUNE STATUS, ANTEPARTUM: ICD-10-CM

## 2017-07-06 DIAGNOSIS — Z23 NEED FOR TDAP VACCINATION: Primary | ICD-10-CM

## 2017-07-06 DIAGNOSIS — Z34.03 ENCOUNTER FOR SUPERVISION OF NORMAL FIRST PREGNANCY IN THIRD TRIMESTER: ICD-10-CM

## 2017-07-06 PROCEDURE — 90471 IMMUNIZATION ADMIN: CPT | Performed by: OBSTETRICS & GYNECOLOGY

## 2017-07-06 PROCEDURE — 99207 ZZC PRENATAL VISIT: CPT | Performed by: OBSTETRICS & GYNECOLOGY

## 2017-07-06 PROCEDURE — 90715 TDAP VACCINE 7 YRS/> IM: CPT | Performed by: OBSTETRICS & GYNECOLOGY

## 2017-07-06 NOTE — NURSING NOTE
"Chief Complaint   Patient presents with     Prenatal Care     32 weeks       Initial /83 (BP Location: Left arm, Cuff Size: Adult Large)  Pulse 89  Wt 215 lb (97.5 kg)  LMP 11/23/2016 (Exact Date)  SpO2 98%  BMI 35.23 kg/m2 Estimated body mass index is 35.23 kg/(m^2) as calculated from the following:    Height as of 6/9/17: 5' 5.5\" (1.664 m).    Weight as of this encounter: 215 lb (97.5 kg).  Medication Reconciliation: complete   RANJITH Bosch 7/6/2017         "

## 2017-07-06 NOTE — PROGRESS NOTES
TDAP Vaccine (Adacel)      Date Status Dose VIS Date Route Site  Lot# Given By Verified By     7/6/2017 Given 0.5 mL 02/24/2015, Given Today IM LD [employer] [id number] Olga Razo CMA --         Exp. Date NDC # Product Time Location External Comment     3/29/2019   --  --      Updated by: Olga Razo CMA on 7/6/2017  4:58 PM

## 2017-07-06 NOTE — MR AVS SNAPSHOT
"              After Visit Summary   7/6/2017    Bernice Garcia    MRN: [id number]           Patient Information     Date Of Birth          1985        Visit Information        Provider Department      7/6/2017 4:30 PM Eddie Richards MD [facility]        Today's Diagnoses     Rubella non-immune status, antepartum        Encounter for supervision of normal first pregnancy in third trimester           Follow-ups after your visit        Your next 10 appointments already scheduled     Jul 20, 2017  4:45 PM CDT   ESTABLISHED PRENATAL with Eddie Richards MD   [facility] ([facility])    [address] [place] [id number] [phone]              Who to contact     If you have questions or need follow up information about today's clinic visit or your schedule please contact [facility] directly at [phone].  Normal or non-critical lab and imaging results will be communicated to you by MyChart, letter or phone within 4 business days after the clinic has received the results. If you do not hear from us within 7 days, please contact the clinic through MyChart or phone. If you have a critical or abnormal lab result, we will notify you by phone as soon as possible.  Submit refill requests through [facility] or call your pharmacy and they will forward the refill request to us. Please allow 3 business days for your refill to be completed.          Additional Information About Your Visit        MyChart Information     [facility] lets you send messages to your doctor, view your test results, renew your prescriptions, schedule appointments and more. To sign up, go to [email or web address] . Click on \"Log in\" on the left side of the screen, which will take you to the Welcome page. Then click on \"Sign up Now\" on the right side of the page.     You will be asked to enter the access code listed below, as well as some " personal information. Please follow the directions to create your username and password.     Your access code is: [email or web address]  Expires: 2017  4:32 PM     Your access code will  in 90 days. If you need help or a new code, please call your [facility] or [phone].        Care EveryWhere ID     This is your Care EveryWhere ID. This could be used by other organizations to access your [place] medical records  [id number]        Your Vitals Were     Pulse Last Period Pulse Oximetry BMI (Body Mass Index)          89 2016 (Exact Date) 98% 35.23 kg/m2         Blood Pressure from Last 3 Encounters:   17 131/83   17 145/89   17 133/82    Weight from Last 3 Encounters:   17 215 lb (97.5 kg)   17 211 lb (95.7 kg)   17 211 lb (95.7 kg)              Today, you had the following     No orders found for display       Primary Care Provider Office Phone # Fax #    Martínez Reynoso PA-C [phone]       [address] [address]        Equal Access to Services     MARVIN RUFFIN : Hadii aad ku hadasho Soomaali, waaxda luqadaha, qaybta kaalmada adeegyada, waxay idiin haymilesn karel tinoco . So [facility] [phone].    ATENCIÓN: Si habla español, tiene a newsome disposición servicios gratuitos de asistencia lingüística. Llame al [phone].    We comply with applicable federal civil rights laws and [place] laws. We do not discriminate on the basis of race, color, national origin, age, disability sex, sexual orientation or gender identity.            Thank you!     Thank you for choosing [facility]  for your care. Our goal is always to provide you with excellent care. Hearing back from our patients is one way we can continue to improve our services. Please take a few minutes to complete the written survey that you may receive in the mail after your visit with us. Thank you!             Your  Updated Medication List - Protect others around you: Learn how to safely use, store and throw away your medicines at www.disposemymeds.org.          This list is accurate as of: 7/6/17  4:40 PM.  Always use your most recent med list.                   Brand Name Dispense Instructions for use Diagnosis    doxylamine 25 MG Tabs tablet    UNISOM     Take 12.5 mg by mouth nightly as needed        PRENA1 PO

## 2017-07-06 NOTE — NURSING NOTE
Screening Questionnaire for Adult Immunization    Are you sick today?   No   Do you have allergies to medications, food, a vaccine component or latex?   No   Have you ever had a serious reaction after receiving a vaccination?   No   Do you have a long-term health problem with heart disease, lung disease, asthma, kidney disease, metabolic disease (e.g. diabetes), anemia, or other blood disorder?   No   Do you have cancer, leukemia, HIV/AIDS, or any other immune system problem?   No   In the past 3 months, have you taken medications that affect  your immune system, such as prednisone, other steroids, or anticancer drugs; drugs for the treatment of rheumatoid arthritis, Crohn s disease, or psoriasis; or have you had radiation treatments?   No   Have you had a seizure, or a brain or other nervous system problem?   No   During the past year, have you received a transfusion of blood or blood     products, or been given immune (gamma) globulin or antiviral drug?   No   For women: Are you pregnant or is there a chance you could become        pregnant during the next month?  YES   Have you received any vaccinations in the past 4 weeks?   No     Immunization questionnaire was positive for at least one answer.  Notified J.      MNVFC doesn't apply on this patient    Per orders of Dr. Richards, injection of Tdap given by Olga Razo. Patient instructed to remain in clinic for 15 minutes afterwards, and to report any adverse reaction to me immediately.       Screening performed by Olga Razo on 7/6/2017 at 4:59 PM.

## 2017-07-20 ENCOUNTER — PRENATAL OFFICE VISIT (OUTPATIENT)
Dept: OBGYN | Facility: CLINIC | Age: 32
End: 2017-07-20
Payer: COMMERCIAL

## 2017-07-20 VITALS
SYSTOLIC BLOOD PRESSURE: 140 MMHG | DIASTOLIC BLOOD PRESSURE: 86 MMHG | OXYGEN SATURATION: 100 % | WEIGHT: 216 LBS | TEMPERATURE: 98.2 F | HEART RATE: 110 BPM | BODY MASS INDEX: 35.4 KG/M2

## 2017-07-20 DIAGNOSIS — O09.899 RUBELLA NON-IMMUNE STATUS, ANTEPARTUM: ICD-10-CM

## 2017-07-20 DIAGNOSIS — Z34.03 ENCOUNTER FOR SUPERVISION OF NORMAL FIRST PREGNANCY IN THIRD TRIMESTER: ICD-10-CM

## 2017-07-20 DIAGNOSIS — Z28.39 RUBELLA NON-IMMUNE STATUS, ANTEPARTUM: ICD-10-CM

## 2017-07-20 PROCEDURE — 99207 ZZC PRENATAL VISIT: CPT | Performed by: OBSTETRICS & GYNECOLOGY

## 2017-07-20 NOTE — PROGRESS NOTES
No signif signs, symptoms or concerns. Borderline BP again today similar to past. Advice appropriate to gestational age reviewed including pertinent Checklist items. Discussed condition, tests and care plan including RBA. Problem list updated. GBS next.  A/P:  Bernice was seen today for prenatal care.    Diagnoses and all orders for this visit:    Rubella non-immune status, antepartum    Encounter for supervision of normal first pregnancy in third trimester        Eddie Richards MD

## 2017-07-20 NOTE — NURSING NOTE
"Chief Complaint   Patient presents with     Prenatal Care     OBV 34 weeks       Initial /86 (BP Location: Left arm, Patient Position: Chair, Cuff Size: Adult Regular)  Pulse 110  Temp 98.2  F (36.8  C) (Oral)  Wt 216 lb (98 kg)  LMP 11/23/2016 (Exact Date)  SpO2 100%  Breastfeeding? No  BMI 35.4 kg/m2 Estimated body mass index is 35.4 kg/(m^2) as calculated from the following:    Height as of 6/9/17: 5' 5.5\" (1.664 m).    Weight as of this encounter: 216 lb (98 kg).  Medication Reconciliation: complete   Sharona Pedro CMA      "

## 2017-07-20 NOTE — PATIENT INSTRUCTIONS
If you have any questions regarding your visit, Please contact your care team.     [facility] [employer]: [phone]    [facility] CLINIC HOURS TELEPHONE NUMBER   MYRNA Esquivel-    Starla Webb-TYLER Tabor-Medical Assistant   Monday-[place]  8:00a.m-4:45 p.m  Wednesday-[place] 8:00a.m-4:45 p.m.  Thursday-[place]  8:00a.m-4:45 p.m.  Friday-[place]  8:00a.m-4:45 p.m. [facility]  [address]  [place], [place] [address] [phone] ask [facility]  [phone] Fax  Imaging [phone]    [facility] Labor and Delivery  [address] [place] [address] Dr.  [place], [place] [address] [phone]    [facility]  [address] [place] [address]  [phone] ask [facility]  [phone] Fax  Imaging [phone]     Urgent Care locations:    [place]        [place] Monday-Friday  5 pm - 9 pm  Saturday and Sunday   9 am - 5 pm    Monday-Friday   11 am - 9 pm  Saturday and Sunday   9 am - 5 pm   [phone]       If you need a medication refill, please contact your pharmacy. Please allow 3 business days for your refill to be completed.  As always, Thank you for trusting us with your healthcare needs!

## 2017-07-20 NOTE — MR AVS SNAPSHOT
After Visit Summary   7/20/2017    Bernice Garcia    MRN: [id number]           Patient Information     Date Of Birth          1985        Visit Information        Provider Department      7/20/2017 4:45 PM Eddie Richards MD [facility]        Today's Diagnoses     Rubella non-immune status, antepartum        Encounter for supervision of normal first pregnancy in third trimester          Care Instructions                                                        If you have any questions regarding your visit, Please contact your care team.     [facility] Access Services: [phone]    [facility] CLINIC HOURS TELEPHONE NUMBER   Eddie Richards M.D.      Hellen-    Starla Webb-TYLER Curryi-Medical Assistant   Monday-[place]  8:00a.m-4:45 p.m  Wednesday-[place] 8:00a.m-4:45 p.m.  Thursday-[place]  8:00a.m-4:45 p.m.  Friday-[place]  8:00a.m-4:45 p.m. [facility]  [address] [place] [address] [place] [address]  [phone] ask [facility]  [phone] Fax  Imaging [phone]    [facility] Labor and Delivery  [address] [place] [address] [place] [address]  [phone]    [facility]  [address] [facility] [place] [address]  [phone] ask [facility]  [phone] Fax  Imaging [phone]     Urgent Care locations:    [facility] Monday-Friday  5 pm - 9 pm  Saturday and Sunday   9 am - 5 pm    Monday-Friday   11 am - 9 pm  Saturday and Sunday   9 am - 5 pm   [phone]       If you need a medication refill, please contact your pharmacy. Please allow 3 business days for your refill to be completed.  As always, Thank you for trusting us with your healthcare needs!            Follow-ups after your visit        Your next 10 appointments already scheduled     Aug 03, 2017  8:30 AM CDT   ESTABLISHED PRENATAL with Tariq Novoa  "MD Nino   [facility] ([facility])    [address] [place] [id number]   [phone]            Aug 09, 2017  4:30 PM CDT   [facility]ABL[facility] PRENATAL with [facility] MD   [facility]    [address] [facility] [id number] [phone]            Aug 17, 2017  4:45 PM CDT   ESTABLISHED PRENATAL with Eddie Richards MD   [facility]    [address] [facility] [id number] [phone]              Who to contact     If you have questions or need follow up information about today's clinic visit or your schedule please contact [facility] directly at [phone].  Normal or non-critical lab and imaging results will be communicated to you by MyChart, letter or phone within 4 business days after the clinic has received the results. If you do not hear from us within 7 days, please contact the clinic through [facility] or phone. If you have a critical or abnormal lab result, we will notify you by phone as soon as possible.  Submit refill requests through [facility] or call your pharmacy and they will forward the refill request to us. Please allow 3 business days for your refill to be completed.          Additional Information About Your Visit        [facility] Information     [facility] lets you send messages to your doctor, view your test results, renew your prescriptions, schedule appointments and more. To sign up, go to [email or web address] . Click on \"Log in\" on the left side of the screen, which will take you to the Welcome page. Then click on \"Sign up Now\" on the right side of the page.     You will be asked to enter the access code listed below, as well as some personal information. Please follow the directions to create your username and password.     Your access code is: [email or web address]  Expires: 9/7/2017  " 4:32 PM     Your access code will  in 90 days. If you need help or a new code, please call your [facility] or [phone].        Care EveryWhere ID     This is your Care EveryWhere ID. This could be used by other organizations to access your [place] medical records  [id number]        Your Vitals Were     Pulse Temperature Last Period Pulse Oximetry Breastfeeding? BMI (Body Mass Index)    110 98.2  F (36.8  C) (Oral) 2016 (Exact Date) 100% No 35.4 kg/m2       Blood Pressure from Last 3 Encounters:   17 140/86   17 131/83   17 145/89    Weight from Last 3 Encounters:   17 216 lb (98 kg)   17 215 lb (97.5 kg)   17 211 lb (95.7 kg)              Today, you had the following     No orders found for display       Primary Care Provider Office Phone # Fax #    Martínez Reynoso PA-C [phone]       [address] [address]        Equal Access to Services     [facility] : Hadii aad ku hadasho Soomaali, waaxda luqadaha, qaybta kaalmada adeegyada, waxmonie idiin hayaan karel tinoco . So [facility] [phone].    ATENCIÓN: Si habla español, tiene a newsome disposición servicios gratuitos de asistencia lingüística. Llame al [phone].    We comply with applicable federal civil rights laws and [place] laws. We do not discriminate on the basis of race, color, national origin, age, disability sex, sexual orientation or gender identity.            Thank you!     Thank you for choosing [facility]  for your care. Our goal is always to provide you with excellent care. Hearing back from our patients is one way we can continue to improve our services. Please take a few minutes to complete the written survey that you may receive in the mail after your visit with us. Thank you!             Your Updated Medication List - Protect others around you: Learn how to safely use, store and throw away your  medicines at www.disposemymeds.org.          This list is accurate as of: 7/20/17  4:53 PM.  Always use your most recent med list.                   Brand Name Dispense Instructions for use Diagnosis    doxylamine 25 MG Tabs tablet    UNISOM     Take 12.5 mg by mouth nightly as needed        PRENA1 PO

## 2017-08-03 ENCOUNTER — PRENATAL OFFICE VISIT (OUTPATIENT)
Dept: OBGYN | Facility: CLINIC | Age: 32
End: 2017-08-03
Payer: COMMERCIAL

## 2017-08-03 VITALS
HEIGHT: 66 IN | TEMPERATURE: 98.7 F | WEIGHT: 220 LBS | DIASTOLIC BLOOD PRESSURE: 85 MMHG | OXYGEN SATURATION: 97 % | HEART RATE: 127 BPM | BODY MASS INDEX: 35.36 KG/M2 | SYSTOLIC BLOOD PRESSURE: 129 MMHG

## 2017-08-03 DIAGNOSIS — Z28.39 RUBELLA NON-IMMUNE STATUS, ANTEPARTUM: ICD-10-CM

## 2017-08-03 DIAGNOSIS — Z34.03 ENCOUNTER FOR SUPERVISION OF NORMAL FIRST PREGNANCY IN THIRD TRIMESTER: Primary | ICD-10-CM

## 2017-08-03 DIAGNOSIS — O09.899 RUBELLA NON-IMMUNE STATUS, ANTEPARTUM: ICD-10-CM

## 2017-08-03 PROCEDURE — 87653 STREP B DNA AMP PROBE: CPT | Performed by: OBSTETRICS & GYNECOLOGY

## 2017-08-03 PROCEDURE — 99207 ZZC PRENATAL VISIT: CPT | Performed by: OBSTETRICS & GYNECOLOGY

## 2017-08-03 ASSESSMENT — PAIN SCALES - GENERAL: PAINLEVEL: NO PAIN (0)

## 2017-08-03 NOTE — NURSING NOTE
"Chief Complaint   Patient presents with     Prenatal Care       Initial /87 (BP Location: Right arm, Patient Position: Chair, Cuff Size: Adult Large)  Pulse 127  Temp 98.7  F (37.1  C) (Oral)  Ht 5' 5.5\" (1.664 m)  Wt 220 lb (99.8 kg)  LMP 11/23/2016 (Exact Date)  SpO2 97%  BMI 36.05 kg/m2 Estimated body mass index is 36.05 kg/(m^2) as calculated from the following:    Height as of this encounter: 5' 5.5\" (1.664 m).    Weight as of this encounter: 220 lb (99.8 kg).  Medication Reconciliation: alea Castellon MA      "

## 2017-08-03 NOTE — MR AVS SNAPSHOT
After Visit Summary   8/3/2017    Bernice Garcia    MRN: [id number]           Patient Information     Date Of Birth          1985        Visit Information        Provider Department      8/3/2017 8:30 AM Tariq Oneill MD [facility]        Today's Diagnoses     Encounter for supervision of normal first pregnancy in third trimester    -  1    Rubella non-immune status, antepartum           Follow-ups after your visit        Follow-up notes from your care team     Return in about 1 week (around 8/10/2017) for prenatal visit.      Your next 10 appointments already scheduled     Aug 09, 2017  4:30 PM CDT   ESTABLISHED PRENATAL with Eddie Richards MD   [facility]    [address] [facility] [id number] [phone]            Aug 17, 2017  4:45 PM CDT   ESTABLISHED PRENATAL with Eddie Richards MD   [facility]    [address] [facility] [id number] [phone]              Who to contact     If you have questions or need follow up information about today's clinic visit or your schedule please contact [facility] directly at [phone].  Normal or non-critical lab and imaging results will be communicated to you by [employer], letter or phone within 4 business days after the clinic has received the results. If you do not hear from us within 7 days, please contact the clinic through [employer] or phone. If you have a critical or abnormal lab result, we will notify you by phone as soon as possible.  Submit refill requests through [facility] or call your pharmacy and they will forward the refill request to us. Please allow 3 business days for your refill to be completed.          Additional Information About Your Visit        [facility] Information     [facility] lets you send messages to your doctor, view your test  "results, renew your prescriptions, schedule appointments and more. To sign up, go to [email or web address] . Click on \"Log in\" on the left side of the screen, which will take you to the Welcome page. Then click on \"Sign up Now\" on the right side of the page.     You will be asked to enter the access code listed below, as well as some personal information. Please follow the directions to create your username and password.     Your access code is: [email or web address]  Expires: 2017  4:32 PM     Your access code will  in 90 days. If you need help or a new code, please call your [place] clinic or [phone].        Care EveryWhere ID     This is your Care EveryWhere ID. This could be used by other organizations to access your [place] medical records  [id number]        Your Vitals Were     Pulse Temperature Height Last Period Pulse Oximetry BMI (Body Mass Index)    127 98.7  F (37.1  C) (Oral) 5' 5.5\" (1.664 m) 2016 (Exact Date) 97% 36.05 kg/m2       Blood Pressure from Last 3 Encounters:   17 129/85   17 140/86   17 131/83    Weight from Last 3 Encounters:   17 220 lb (99.8 kg)   17 216 lb (98 kg)   17 215 lb (97.5 kg)              We Performed the Following     Strep, Group B by PCR        Primary Care Provider Office Phone # Fax #    Martínez Reynoso PA-C [phone]       [facility] [address] [facility] [address]        Equal Access to Services     MARVIN RFUFIN : Hadii ghislaine Denney, wafortinoda luqadaha, qaybta kaalmada navi, cameron anaya. So [facility] [phone].    ATENCIÓN: Si habla español, tiene a newsome disposición servicios gratuitos de asistencia lingüística. Llame al [phone].    We comply with applicable federal civil rights laws and [place] laws. We do not discriminate on the basis of race, color, national origin, age, disability sex, sexual orientation or gender " identity.            Thank you!     Thank you for choosing [facility]  for your care. Our goal is always to provide you with excellent care. Hearing back from our patients is one way we can continue to improve our services. Please take a few minutes to complete the written survey that you may receive in the mail after your visit with us. Thank you!             Your Updated Medication List - Protect others around you: Learn how to safely use, store and throw away your medicines at www.disposemymeds.org.          This list is accurate as of: 8/3/17 12:40 PM.  Always use your most recent med list.                   Brand Name Dispense Instructions for use Diagnosis    doxylamine 25 MG Tabs tablet    UNISOM     Take 12.5 mg by mouth nightly as needed        PRENA1 PO

## 2017-08-03 NOTE — PROGRESS NOTES
36w0d    No HA, visual changes, N/V  Labor plan and warning s/s discussed.  GBS testing is performed today  RTC 1 week  Tariq Oneill MD

## 2017-08-04 LAB
GP B STREP DNA SPEC QL NAA+PROBE: NORMAL
SPECIMEN SOURCE: NORMAL

## 2017-08-09 ENCOUNTER — PRENATAL OFFICE VISIT (OUTPATIENT)
Dept: OBGYN | Facility: CLINIC | Age: 32
End: 2017-08-09
Payer: COMMERCIAL

## 2017-08-09 VITALS
WEIGHT: 223 LBS | HEART RATE: 98 BPM | BODY MASS INDEX: 36.54 KG/M2 | TEMPERATURE: 98.8 F | DIASTOLIC BLOOD PRESSURE: 84 MMHG | SYSTOLIC BLOOD PRESSURE: 139 MMHG

## 2017-08-09 DIAGNOSIS — Z34.03 ENCOUNTER FOR SUPERVISION OF NORMAL FIRST PREGNANCY IN THIRD TRIMESTER: ICD-10-CM

## 2017-08-09 DIAGNOSIS — O09.899 RUBELLA NON-IMMUNE STATUS, ANTEPARTUM: ICD-10-CM

## 2017-08-09 DIAGNOSIS — Z28.39 RUBELLA NON-IMMUNE STATUS, ANTEPARTUM: ICD-10-CM

## 2017-08-09 PROCEDURE — 99207 ZZC PRENATAL VISIT: CPT | Performed by: OBSTETRICS & GYNECOLOGY

## 2017-08-09 NOTE — PATIENT INSTRUCTIONS
If you have any questions regarding your visit, Please contact your care team.     [facility] [employer] [facility] Access [facility]: [phone]    [facility] CLINIC HOURS TELEPHONE NUMBER   MYRNA Esquivel-    Starla Webb-TYLER Tabor-Medical Assistant   Monday-[place]  8:00a.m-4:45 p.m  Wednesday-[place] 8:00a.m-4:45 p.m.  Thursday-[place]  8:00a.m-4:45 p.m.  Friday-[place]  8:00a.m-4:45 p.m. [facility]  [address]  [place], [place] [address] [phone] ask [facility]  [phone] Fax  Imaging [phone]    [facility] Labor and Delivery  [address] [place] [address] Dr.  [place], [place] [address] [phone]    [facility]  [address] [place] [address]  [phone] ask [facility]  [phone] Fax  Imaging [phone]     Urgent Care locations:    [place]        [place] Monday-Friday  5 pm - 9 pm  Saturday and Sunday   9 am - 5 pm    Monday-Friday   11 am - 9 pm  Saturday and Sunday   9 am - 5 pm   [phone]       If you need a medication refill, please contact your pharmacy. Please allow 3 business days for your refill to be completed.  As always, Thank you for trusting us with your healthcare needs!

## 2017-08-09 NOTE — MR AVS SNAPSHOT
After Visit Summary   8/9/2017    Bernice Garcia    MRN: [id number]           Patient Information     Date Of Birth          1985        Visit Information        Provider Department      8/9/2017 4:30 PM Eddie Richards MD [facility]        Today's Diagnoses     Rubella non-immune status, antepartum        Encounter for supervision of normal first pregnancy in third trimester          Care Instructions                                                        If you have any questions regarding your visit, Please contact your care team.     [facility] Access Services: [phone]    [facility] CLINIC HOURS TELEPHONE NUMBER   Eddie Richards M.D.      Hellen-    Starla Webb-RN    Antoinettei-Medical Assistant   Monday-[place]  8:00a.m-4:45 p.m  Wednesday-[place] 8:00a.m-4:45 p.m.  Thursday-[place]  8:00a.m-4:45 p.m.  Friday-[place]  8:00a.m-4:45 p.m. [facility]  [address] [place] [address]  [phone] ask [facility]  [phone] Fax  Imaging [phone]    [facility] Labor and Delivery  [address] [place] [address] [place] [address]  [phone]    [facility]  [address] [place] [address]  [phone] ask [facility]  [phone] Fax  Imaging [phone]     Urgent Care locations:    [facility] Monday-Friday  5 pm - 9 pm  Saturday and Sunday   9 am - 5 pm    Monday-Friday   11 am - 9 pm  Saturday and Sunday   9 am - 5 pm   [phone]       If you need a medication refill, please contact your pharmacy. Please allow 3 business days for your refill to be completed.  As always, Thank you for trusting us with your healthcare needs!            Follow-ups after your visit        Your next 10 appointments already scheduled     Aug 17, 2017  4:45 PM CDT   ESTABLISHED PRENATAL with Eddie Richards,  "MD   [facility] ([facility])    [address] [place] [address] [phone]              Who to contact     If you have questions or need follow up information about today's clinic visit or your schedule please contact [facility] directly at [phone].  Normal or non-critical lab and imaging results will be communicated to you by MyChart, letter or phone within 4 business days after the clinic has received the results. If you do not hear from us within 7 days, please contact the clinic through MyChart or phone. If you have a critical or abnormal lab result, we will notify you by phone as soon as possible.  Submit refill requests through [employer] or call your pharmacy and they will forward the refill request to us. Please allow 3 business days for your refill to be completed.          Additional Information About Your Visit        [employer] Information     [employer] lets you send messages to your doctor, view your test results, renew your prescriptions, schedule appointments and more. To sign up, go to [email or web address] . Click on \"Log in\" on the left side of the screen, which will take you to the Welcome page. Then click on \"Sign up Now\" on the right side of the page.     You will be asked to enter the access code listed below, as well as some personal information. Please follow the directions to create your username and password.     Your access code is: [email or web address]  Expires: 2017  4:32 PM     Your access code will  in 90 days. If you need help or a new code, please call your [place] clinic or [phone].        Care EveryWhere ID     This is your Care EveryWhere ID. This could be used by other organizations to access your [place] medical records  [id number]        Your Vitals Were     Pulse Temperature Last Period Breastfeeding? BMI (Body Mass Index)       98 98.8  F (37.1  C) (Oral) 2016 (Exact Date) No " 36.54 kg/m2        Blood Pressure from Last 3 Encounters:   08/09/17 139/84   08/03/17 129/85   07/20/17 140/86    Weight from Last 3 Encounters:   08/09/17 223 lb (101.2 kg)   08/03/17 220 lb (99.8 kg)   07/20/17 216 lb (98 kg)              Today, you had the following     No orders found for display       Primary Care Provider Office Phone # Fax #    Martínez Reynoso PA-C [phone] [address] [facility] [address]        Equal Access to Services     [facility]: Hadii ghislaine hernandez hadasho Soomaali, waaxda luqadaha, qaybta kaalmada adejoseeyada, cameron tinoco . So [facility] [phone].    ATENCIÓN: Si habla español, tiene a newsome disposición servicios gratuitos de asistencia lingüística. [facility] [phone].    We comply with applicable federal civil rights laws and [place] laws. We do not discriminate on the basis of race, color, national origin, age, disability sex, sexual orientation or gender identity.            Thank you!     Thank you for choosing [facility]  for your care. Our goal is always to provide you with excellent care. Hearing back from our patients is one way we can continue to improve our services. Please take a few minutes to complete the written survey that you may receive in the mail after your visit with us. Thank you!             Your Updated Medication List - Protect others around you: Learn how to safely use, store and throw away your medicines at www.disposemymeds.org.          This list is accurate as of: 8/9/17  4:46 PM.  Always use your most recent med list.                   Brand Name Dispense Instructions for use Diagnosis    doxylamine 25 MG Tabs tablet    UNISOM     Take 12.5 mg by mouth nightly as needed        PRENA1 PO

## 2017-08-09 NOTE — PROGRESS NOTES
No signif signs, symptoms or concerns except pregnancy discomforts. Advice appropriate to gestational age reviewed including pertinent Checklist items. Discussed condition, tests and care plan including RBA. Problem list updated.   A/P:  Bernice was seen today for prenatal care.    Diagnoses and all orders for this visit:    Rubella non-immune status, antepartum    Encounter for supervision of normal first pregnancy in third trimester        Eddie Richards MD

## 2017-08-09 NOTE — NURSING NOTE
"Chief Complaint   Patient presents with     Prenatal Care     OBV 36w6d       Initial /84 (BP Location: Left arm, Patient Position: Chair, Cuff Size: Adult Regular)  Pulse 98  Temp 98.8  F (37.1  C) (Oral)  Wt 223 lb (101.2 kg)  LMP 11/23/2016 (Exact Date)  Breastfeeding? No  BMI 36.54 kg/m2 Estimated body mass index is 36.54 kg/(m^2) as calculated from the following:    Height as of 8/3/17: 5' 5.5\" (1.664 m).    Weight as of this encounter: 223 lb (101.2 kg).  Medication Reconciliation: complete   Sharona Pedro CMA      "

## 2017-08-17 ENCOUNTER — PRENATAL OFFICE VISIT (OUTPATIENT)
Dept: OBGYN | Facility: CLINIC | Age: 32
End: 2017-08-17
Payer: COMMERCIAL

## 2017-08-17 VITALS
DIASTOLIC BLOOD PRESSURE: 91 MMHG | TEMPERATURE: 97.6 F | WEIGHT: 223 LBS | BODY MASS INDEX: 36.54 KG/M2 | HEART RATE: 86 BPM | SYSTOLIC BLOOD PRESSURE: 144 MMHG

## 2017-08-17 DIAGNOSIS — Z34.03 ENCOUNTER FOR SUPERVISION OF NORMAL FIRST PREGNANCY IN THIRD TRIMESTER: ICD-10-CM

## 2017-08-17 DIAGNOSIS — Z28.39 RUBELLA NON-IMMUNE STATUS, ANTEPARTUM: ICD-10-CM

## 2017-08-17 DIAGNOSIS — O09.899 RUBELLA NON-IMMUNE STATUS, ANTEPARTUM: ICD-10-CM

## 2017-08-17 PROCEDURE — 99207 ZZC PRENATAL VISIT: CPT | Performed by: OBSTETRICS & GYNECOLOGY

## 2017-08-17 NOTE — MR AVS SNAPSHOT
After Visit Summary   8/17/2017    Bernice Garcia    MRN: [id number]           Patient Information     Date Of Birth          1985        Visit Information        Provider Department      8/17/2017 4:45 PM [facility]        Today's Diagnoses     Rubella non-immune status, antepartum        Encounter for supervision of normal first pregnancy in third trimester          Care Instructions                                                        If you have any questions regarding your visit, Please contact your care team.     [facility] Access Services: [phone]    [facility] CLINIC HOURS TELEPHONE NUMBER   Eddie Richards M.D.      Hellen-    Starla Webb-TYLER Cruryi-Medical Assistant   Monday-[place]  8:00a.m-4:45 p.m  Wednesday-[place] 8:00a.m-4:45 p.m.  Thursday-[place]  8:00a.m-4:45 p.m.  Friday-[place]  8:00a.m-4:45 p.m. [facility]  [address] [place] [address] [place] [address]  [phone] ask for [facility]  [phone] Fax  Imaging [phone]    [facility] Labor and Delivery  [address] [place] [address] [place] [address]  [phone]    [facility]  [address] [place] [address]  [phone] ask for [facility]  [phone] Fax  Imaging [phone]     Urgent Care locations:    [facility] Monday-Friday  5 pm - 9 pm  Saturday and Sunday   9 am - 5 pm    Monday-Friday   11 am - 9 pm  Saturday and Sunday   9 am - 5 pm   [phone]       If you need a medication refill, please contact your pharmacy. Please allow 3 business days for your refill to be completed.  As always, Thank you for trusting us with your healthcare needs!            Follow-ups after your visit        Who to contact     If you have questions or need follow up information about today's clinic visit or your schedule  "please contact [facility] directly at [phone].  Normal or non-critical lab and imaging results will be communicated to you by MyChart, letter or phone within 4 business days after the clinic has received the results. If you do not hear from us within 7 days, please contact the clinic through MyChart or phone. If you have a critical or abnormal lab result, we will notify you by phone as soon as possible.  Submit refill requests through [employer] or call your pharmacy and they will forward the refill request to us. Please allow 3 business days for your refill to be completed.          Additional Information About Your Visit        [employer] Information     [employer] lets you send messages to your doctor, view your test results, renew your prescriptions, schedule appointments and more. To sign up, go to [email or web address] . Click on \"Log in\" on the left side of the screen, which will take you to the Welcome page. Then click on \"Sign up Now\" on the right side of the page.     You will be asked to enter the access code listed below, as well as some personal information. Please follow the directions to create your username and password.     Your access code is: [email or web address]  Expires: 2017  4:32 PM     Your access code will  in 90 days. If you need help or a new code, please call your [place] clinic or [phone].        Care EveryWhere ID     This is your Care EveryWhere ID. This could be used by other organizations to access your [place] medical records  [id number]        Your Vitals Were     Pulse Temperature Last Period Breastfeeding? BMI (Body Mass Index)       86 97.6  F (36.4  C) (Oral) 2016 (Exact Date) No 36.54 kg/m2        Blood Pressure from Last 3 Encounters:   17 (!) 144/91   17 139/84   17 129/85    Weight from Last 3 Encounters:   17 223 lb (101.2 kg)   17 223 lb (101.2 kg)   17 220 lb (99.8 kg)              Today, you had the " following     No orders found for display       Primary Care Provider Office Phone # Fax #    Martínez Reynoso PA-C [phone] [address] [facility]  [facility] [address]        Equal Access to Services     MARVIN RUFFIN : Hadii aad ku hadasho Soomaali, waaxda luqadaha, qaybta kaalmada adeegyada, waxay idiin haymilesn adeeg kharianne latituslashonda anaya. So [facility] [phone].    ATENCIÓN: Si habla español, tiene a newsome disposición servicios gratuitos de asistencia lingüística. Llame al [phone].    We comply with applicable federal civil rights laws and [place] laws. We do not discriminate on the basis of race, color, national origin, age, disability sex, sexual orientation or gender identity.            Thank you!     Thank you for choosing [facility]  for your care. Our goal is always to provide you with excellent care. Hearing back from our patients is one way we can continue to improve our services. Please take a few minutes to complete the written survey that you may receive in the mail after your visit with us. Thank you!             Your Updated Medication List - Protect others around you: Learn how to safely use, store and throw away your medicines at www.disposemymeds.org.          This list is accurate as of: 8/17/17  4:47 PM.  Always use your most recent med list.                   Brand Name Dispense Instructions for use Diagnosis    doxylamine 25 MG Tabs tablet    UNISOM     Take 12.5 mg by mouth nightly as needed        PRENA1 PO

## 2017-08-17 NOTE — NURSING NOTE
"Chief Complaint   Patient presents with     Prenatal Care     OBV 38 weeks       Initial BP (!) 144/91 (BP Location: Left arm, Patient Position: Chair, Cuff Size: Adult Regular)  Pulse 86  Temp 97.6  F (36.4  C) (Oral)  Wt 223 lb (101.2 kg)  LMP 11/23/2016 (Exact Date)  Breastfeeding? No  BMI 36.54 kg/m2 Estimated body mass index is 36.54 kg/(m^2) as calculated from the following:    Height as of 8/3/17: 5' 5.5\" (1.664 m).    Weight as of this encounter: 223 lb (101.2 kg).  Medication Reconciliation: complete   Sharona Pedro CMA      "

## 2017-08-17 NOTE — PATIENT INSTRUCTIONS
If you have any questions regarding your visit, Please contact your care team.     [facility] [facility] Access Services: [phone]    [facility] CLINIC HOURS TELEPHONE NUMBER   MYRNA Esquivel-    Starla Webb-TYLER Tabor-Medical Assistant   Monday-[place]  8:00a.m-4:45 p.m  Wednesday-[employer] [place] 8:00a.m-4:45 p.m.  Thursday-[employer]  8:00a.m-4:45 p.m.  Friday-[employer] [place]  8:00a.m-4:45 p.m. [facility]  [address]  [place], [place] [address] [phone] ask [facility]  [phone] Fax  Imaging [phone]    [facility] Labor and Delivery  [address] [place] [address] Dr.  [place], [place] [address] [phone]    [facility]  [address] [place] [place] [address]  [phone] ask [facility]  [phone] Fax  Imaging [phone]     Urgent Care locations:    [place]        [employer] [place] Monday-Friday  5 pm - 9 pm  Saturday and Sunday   9 am - 5 pm    Monday-Friday   11 am - 9 pm  Saturday and Sunday   9 am - 5 pm   [phone]       If you need a medication refill, please contact your pharmacy. Please allow 3 business days for your refill to be completed.  As always, Thank you for trusting us with your healthcare needs!

## 2017-08-17 NOTE — PROGRESS NOTES
No signif signs, symptoms or concerns except pregnancy discomforts and possible leakage of fluid over past 2 days. To Labor and Delivery for evaluation and recheck cervix then. Plan induction at 39 weeks. Advice appropriate to gestational age reviewed including pertinent Checklist items. Discussed condition, tests and care plan including RBA. Problem list updated.   A/P:  Bernice was seen today for prenatal care.    Diagnoses and all orders for this visit:    Rubella non-immune status, antepartum    Encounter for supervision of normal first pregnancy in third trimester        Eddie Richards MD

## 2017-08-21 ENCOUNTER — TELEPHONE (OUTPATIENT)
Dept: OBGYN | Facility: CLINIC | Age: 32
End: 2017-08-21

## 2017-08-21 DIAGNOSIS — O09.899 RUBELLA NON-IMMUNE STATUS, ANTEPARTUM: ICD-10-CM

## 2017-08-21 DIAGNOSIS — Z28.39 RUBELLA NON-IMMUNE STATUS, ANTEPARTUM: ICD-10-CM

## 2017-08-21 NOTE — TELEPHONE ENCOUNTER
"Phone call from patient. She stated she went to the bathroom this morning and noted some \"fresh\" blood on the toilet tissue after voiding. Patient stated she has since voided and no more blood at all. Patient stated \"I do ot want to be seen today unless it is an emergency.\" Patient stated she is scheduled for induction on 08-24-17 and no future clinic visits scheduled. Patient stated she was seen on 08-17-17 and sent to [facility] as she thought she might be leaking fld. Patient stated she was not leaking fld and was monitored and BP checked and sent home. Checked [facility] noted an cervical check was 1.5/60/-2. Patient denies h/a, blurred vision, dysuria, contactions or other sx's. Stated it could be part of bloody show as cervix is continuing to thin. Advised she could monitor and call back if sx's change. Patient agreed to follow plan and appreciative of assistance. Tracey Nye RN, BAN      "

## 2017-10-06 ENCOUNTER — PRENATAL OFFICE VISIT (OUTPATIENT)
Dept: OBGYN | Facility: CLINIC | Age: 32
End: 2017-10-06
Payer: COMMERCIAL

## 2017-10-06 VITALS
DIASTOLIC BLOOD PRESSURE: 88 MMHG | TEMPERATURE: 98.7 F | BODY MASS INDEX: 32.78 KG/M2 | HEART RATE: 82 BPM | SYSTOLIC BLOOD PRESSURE: 131 MMHG | WEIGHT: 200 LBS

## 2017-10-06 DIAGNOSIS — Z30.016 ENCOUNTER FOR INITIAL PRESCRIPTION OF TRANSDERMAL PATCH HORMONAL CONTRACEPTIVE DEVICE: ICD-10-CM

## 2017-10-06 DIAGNOSIS — F41.1 GENERALIZED ANXIETY DISORDER: ICD-10-CM

## 2017-10-06 PROBLEM — O09.899 RUBELLA NON-IMMUNE STATUS, ANTEPARTUM: Status: RESOLVED | Noted: 2017-02-17 | Resolved: 2017-10-06

## 2017-10-06 PROBLEM — Z28.39 RUBELLA NON-IMMUNE STATUS, ANTEPARTUM: Status: RESOLVED | Noted: 2017-02-17 | Resolved: 2017-10-06

## 2017-10-06 PROBLEM — Z34.00 SUPERVISION OF NORMAL FIRST PREGNANCY: Status: RESOLVED | Noted: 2017-02-17 | Resolved: 2017-10-06

## 2017-10-06 PROCEDURE — 99207 ZZC POST PARTUM EXAM: CPT | Performed by: OBSTETRICS & GYNECOLOGY

## 2017-10-06 RX ORDER — BUSPIRONE HYDROCHLORIDE 5 MG/1
5 TABLET ORAL 2 TIMES DAILY
Qty: 60 TABLET | Refills: 3 | Status: SHIPPED | OUTPATIENT
Start: 2017-10-06 | End: 2018-02-09

## 2017-10-06 RX ORDER — NORELGESTROMIN AND ETHINYL ESTRADIOL 35; 150 UG/MG; UG/MG
PATCH TRANSDERMAL
Qty: 3 PATCH | Refills: 3 | Status: SHIPPED | OUTPATIENT
Start: 2017-10-06 | End: 2018-02-09

## 2017-10-06 ASSESSMENT — PATIENT HEALTH QUESTIONNAIRE - PHQ9: SUM OF ALL RESPONSES TO PHQ QUESTIONS 1-9: 2

## 2017-10-06 NOTE — NURSING NOTE
"Chief Complaint   Patient presents with     Post Partum Exam       Initial /88 (BP Location: Left arm, Patient Position: Chair, Cuff Size: Adult Regular)  Pulse 82  Temp 98.7  F (37.1  C) (Oral)  Wt 200 lb (90.7 kg)  LMP 09/28/2017  Breastfeeding? No  BMI 32.78 kg/m2 Estimated body mass index is 32.78 kg/(m^2) as calculated from the following:    Height as of 8/3/17: 5' 5.5\" (1.664 m).    Weight as of this encounter: 200 lb (90.7 kg).  Medication Reconciliation: complete   Sharona Pedro CMA      "

## 2017-10-06 NOTE — MR AVS SNAPSHOT
After Visit Summary   10/6/2017    Bernice Garcia    MRN: [id number]           Patient Information     Date Of Birth          1985        Visit Information        Provider Department      10/6/2017 11:00 AM Eddie Richards MD [facility]        Care Instructions                                                        If you have any questions regarding your visit, Please contact your care team.     [facility] Access Services: [phone]    [facility] CLINIC HOURS TELEPHONE NUMBER   Eddie Richards M.D.      Hellen-    Starla Webb-TYLER Tabor-Medical Assistant   Monday-[place]  8:00a.m-4:45 p.m  [facility] 8:00a.m-4:45 p.m.  Thursday[facility]  8:00a.m-4:45 p.m.  FridaySt[facility]  8:00a.m-4:45 p.m. [facility]  [address] [place] [address].  [place] [address]  [phone] [facility]  [phone] Fax  Imaging [phone]    [facility] Labor and Delivery  [address] [place] [address] Dr.  [place], [place] [address] [phone]    [facility]  [address] [facility] [place] [address]  [phone] [facility]  [phone] Fax  Imaging [phone]     Urgent Care locations:    [facility] Monday-Friday  5 pm - 9 pm  Saturday and Sunday   9 am - 5 pm    Monday-Friday   11 am - 9 pm  Saturday and Sunday   9 am - 5 pm   [phone]       If you need a medication refill, please contact your pharmacy. Please allow 3 business days for your refill to be completed.  As always, Thank you for trusting us with your healthcare needs!            Follow-ups after your visit        Your next 10 appointments already scheduled     Oct 18, 2017 11:40 AM CDT   PHYSICAL with KYLE Shah CNP   [facility] [place] [address]   [phone]             "Oct 27, 2017  1:40 PM CDT   New Visit with Mere Sanchez OD   [facility]    [address] [place] [address] [phone]              Who to contact     If you have questions or need follow up information about today's clinic visit or your schedule please contact [facility] directly at [phone].  Normal or non-critical lab and imaging results will be communicated to you by MyChart, letter or phone within 4 business days after the clinic has received the results. If you do not hear from us within 7 days, please contact the clinic through MyChart or phone. If you have a critical or abnormal lab result, we will notify you by phone as soon as possible.  Submit refill requests through [employer] or call your pharmacy and they will forward the refill request to us. Please allow 3 business days for your refill to be completed.          Additional Information About Your Visit        [employer] Information     [employer] lets you send messages to your doctor, view your test results, renew your prescriptions, schedule appointments and more. To sign up, go to [email or web address] . Click on \"Log in\" on the left side of the screen, which will take you to the Welcome page. Then click on \"Sign up Now\" on the right side of the page.     You will be asked to enter the access code listed below, as well as some personal information. Please follow the directions to create your username and password.     Your access code is: [email or web address]  Expires: 2018 11:04 AM     Your access code will  in 90 days. If you need help or a new code, please call your [place] clinic or [phone].        Care EveryWhere ID     This is your Care EveryWhere ID. This could be used by other organizations to access your [place] medical records  [id number]        Your Vitals Were     Pulse Temperature Last Period Breastfeeding? BMI (Body Mass Index) "       82 98.7  F (37.1  C) (Oral) 09/28/2017 No 32.78 kg/m2        Blood Pressure from Last 3 Encounters:   10/06/17 131/88   08/17/17 (!) 144/91   08/09/17 139/84    Weight from Last 3 Encounters:   10/06/17 200 lb (90.7 kg)   08/17/17 223 lb (101.2 kg)   08/09/17 223 lb (101.2 kg)              Today, you had the following     No orders found for display       Primary Care Provider Office Phone # Fax #    Martínez Reynoso PA-C [phone]       [address] [facility] [address]        Equal Access to Services     CARLA RUFFIN : Hadii aad ku hadasho Solorie, waaxda luqadaha, qaybta kaalmada adeegyada, cameron anaya. So [facility] [phone].    ATENCIÓN: Si habla español, tiene a newsome disposición servicios gratuitos de asistencia lingüística. Llame al [phone].    We comply with applicable federal civil rights laws and [place] laws. We do not discriminate on the basis of race, color, national origin, age, disability, sex, sexual orientation, or gender identity.            Thank you!     Thank you for choosing [facility]  for your care. Our goal is always to provide you with excellent care. Hearing back from our patients is one way we can continue to improve our services. Please take a few minutes to complete the written survey that you may receive in the mail after your visit with us. Thank you!             Your Updated Medication List - Protect others around you: Learn how to safely use, store and throw away your medicines at www.disposemymeds.org.          This list is accurate as of: 10/6/17 11:04 AM.  Always use your most recent med list.                   Brand Name Dispense Instructions for use Diagnosis    doxylamine 25 MG Tabs tablet    UNISOM     Take 12.5 mg by mouth nightly as needed        PRENA1 PO

## 2017-10-06 NOTE — PATIENT INSTRUCTIONS
If you have any questions regarding your visit, Please contact your care team.     [employer] [facility] Access Services: [phone]    [facility] CLINIC HOURS TELEPHONE NUMBER   MYRNA Esquivel-    Starla Webb-TYLER Tabor-Medical Assistant   Monday-[place]  8:00a.m-4:45 p.m  Wednesday-[place] 8:00a.m-4:45 p.m.  Thursday-[place]  8:00a.m-4:45 p.m.  Friday-[place]  8:00a.m-4:45 p.m. [facility]  [address]  [place], [place] [address] [phone] ask [facility]  [phone] Fax  Imaging [phone]    [facility] Labor and Delivery  [address] [place] [address] Dr.  [place], [place] [address] [phone]    [facility]  [address] [place] [address]  [phone] ask [facility]  [phone] Fax  Imaging [id number]-[phone]     Urgent Care locations:    [place]        [place] Monday-Friday  5 pm - 9 pm  Saturday and Sunday   9 am - 5 pm    Monday-Friday   11 am - 9 pm  Saturday and Sunday   9 am - 5 pm   [phone]       If you need a medication refill, please contact your pharmacy. Please allow 3 business days for your refill to be completed.  As always, Thank you for trusting us with your healthcare needs!

## 2017-10-06 NOTE — PROGRESS NOTES
Bernice Garcia is a 32 year old year old G 1 P 1 who is here today for a postpartum exam.    HPI:      Doing well and without signif sx or concerns except some chronic anxiety. Currently bottle feeding infant. Here today with infant son. Infant doing well. Contraceptive method planned is patch. NSA since delivery. PP depression screening as noted. See PHQ-9. Score = 2.    Past medical, obstetrical, surgical, family and social history reviewed and as noted or updated in chart.     Allergies, meds and supplements are as noted or updated in chart.      ROS:     Systems reviewed include constitutional; breast;                 cardiac; respiratory; gastrointestinal; genitourinary;                                musculoskeletal; integumentary; psychological;                                hematologic/lymphatic and endocrine.                  These systems were negative for significant symptoms except                 for the following: none and see HPI.                                Exam:  VS as noted.                    Abd and Pelvis were                             normal or negative except for, or in particular noting, the following                pertinent findings: none.       Assessment: Postpartum exam    Plan and Recommendations: Symptoms, problems and concerns reviewed.  Discussed pregnancy, birth, future pregnancy plans, work plans and infant care issues.  Problem list updated and Pregnancy Episode closed. See orders. RTC in 12 months.  Observe BP.   Medications and prescriptions given as noted.  I reviewed side effects, risks, benefits and instructions on proper use.  Report progress on Buspar in 1 month.     Bernice was seen today for post partum exam.    Diagnoses and all orders for this visit:    Routine postpartum follow-up    Generalized anxiety disorder  -     busPIRone (BUSPAR) 5 MG tablet; Take 1 tablet (5 mg) by mouth 2 times daily    Encounter for initial prescription of transdermal patch  hormonal contraceptive device  -     norelgestromin-ethinyl estradiol (ORTHO EVRA) 150-35 MCG/24HR patch; Remove old patch and apply new patch onto the skin once a week for 3 weeks (21 days). Do not wear patch week 4 (days 22-28), then repeat.        Eddie Richards MD

## 2017-10-27 ENCOUNTER — OFFICE VISIT (OUTPATIENT)
Dept: OPTOMETRY | Facility: CLINIC | Age: 32
End: 2017-10-27
Payer: COMMERCIAL

## 2017-10-27 DIAGNOSIS — H52.13 MYOPIA OF BOTH EYES WITH ASTIGMATISM: Primary | ICD-10-CM

## 2017-10-27 DIAGNOSIS — H52.203 MYOPIA OF BOTH EYES WITH ASTIGMATISM: Primary | ICD-10-CM

## 2017-10-27 PROCEDURE — 92310 CONTACT LENS FITTING OU: CPT | Performed by: OPTOMETRIST

## 2017-10-27 PROCEDURE — 92015 DETERMINE REFRACTIVE STATE: CPT | Performed by: OPTOMETRIST

## 2017-10-27 PROCEDURE — 92004 COMPRE OPH EXAM NEW PT 1/>: CPT | Mod: GA | Performed by: OPTOMETRIST

## 2017-10-27 ASSESSMENT — REFRACTION_WEARINGRX
OS_CYLINDER: +0.50
OD_AXIS: 155
OD_SPHERE: -5.50
OD_CYLINDER: +1.25
OS_SPHERE: -6.25
OS_AXIS: 073
SPECS_TYPE: SVL

## 2017-10-27 ASSESSMENT — CONF VISUAL FIELD
OS_NORMAL: 1
OD_NORMAL: 1
METHOD: COUNTING FINGERS

## 2017-10-27 ASSESSMENT — VISUAL ACUITY
OD_SC: 20/400
OD_CC: 20/40
OS_CC+: -2
OS_CC: 20/30
OD_CC: 20/25
METHOD: SNELLEN - LINEAR
OS_CC: 20/40+2
CORRECTION_TYPE: GLASSES
OD_CC: 20/40
METHOD: SNELLEN - LINEAR
CORRECTION_TYPE: CONTACTS
OS_SC: CF @ 3'
OS_CC+: -1
OD_CC+: -1
OS_CC: 20/20
OD_CC+: -1

## 2017-10-27 ASSESSMENT — REFRACTION_CURRENTRX
OD_SPHERE: -4.50
OS_SPHERE: -5.75
OS_BASECURVE: 8.3
OS_DIAMETER: 14.0
OD_SPHERE: -4.50
OD_BASECURVE: 8.3
OD_DIAMETER: 14.0
OS_BRAND: J&J ACUVUE OASYS BC 8.4, D 14.0
OS_SPHERE: -5.75
OD_BRAND: J&J ACUVUE OASYS BC 8.4, D 14.0

## 2017-10-27 ASSESSMENT — REFRACTION_MANIFEST
OS_CYLINDER: +0.50
OS_SPHERE: -6.50
OS_AXIS: 173
OS_CYLINDER: +0.50
OD_SPHERE: -5.25
OS_AXIS: 030
OD_AXIS: 180
METHOD_AUTOREFRACTION: 1
OS_SPHERE: -6.25
OD_SPHERE: -5.50
OD_CYLINDER: +1.25
OD_CYLINDER: +1.00
OD_AXIS: 155

## 2017-10-27 ASSESSMENT — TONOMETRY
OS_IOP_MMHG: 13
OD_IOP_MMHG: 14
IOP_METHOD: APPLANATION

## 2017-10-27 ASSESSMENT — EXTERNAL EXAM - LEFT EYE: OS_EXAM: NORMAL

## 2017-10-27 ASSESSMENT — CUP TO DISC RATIO
OS_RATIO: 0.3
OD_RATIO: 0.4

## 2017-10-27 ASSESSMENT — EXTERNAL EXAM - RIGHT EYE: OD_EXAM: NORMAL

## 2017-10-27 ASSESSMENT — SLIT LAMP EXAM - LIDS
COMMENTS: NORMAL
COMMENTS: NORMAL

## 2017-10-27 NOTE — MR AVS SNAPSHOT
After Visit Summary   10/27/2017    Bernice Garcia    MRN: [id number]           Patient Information     Date Of Birth          1985        Visit Information        Provider Department      10/27/2017 1:40 PM Mere Sanchez, CORWIN [facility]        Today's Diagnoses     Myopia of both eyes with astigmatism    -  1      Care Instructions    Oasys contact lenses given today. We will order AV 2 trials and call you when they come in. Please try not to sleep in your contact lenses.      Optometry Providers       Clinic Locations                                 Telephone Number   Dr. Mere Auguste   [facility] [place]  [phone]     [facility] Hours:                [facility] Hours:       [facility] Hours:  [address]   [address] [facility]     [address] [place] [address]   [place] [address]    [place] [address]  Phone: [phone]                    Phone [phone]                      Phone: [phone]                          Monday 8:00-7:00                          Monday 8:00-7:00                          Monday 8:00-7:00              Tuesday 8:00-6:00                          Tuesday 8:00-7:00                          Tuesday 8:00-7:00              Wednesday 8:00-6:00                  Wednesday 8:00-7:00                   Wednesday 8:00-7:00      Thursday 8:00-6:00                        Thursday 8:00-7:00                         Thursday 8:00-7:00            Friday 8:00-5:00                              Friday 8:00-5:00                              Friday 8:00-5:00    Please log on to [email or web address] to order your contact lenses.  The link is found on the [facility] Care [facility] Vision Services page.  As always, Thank you for trusting us with your health care needs!          Follow-ups after your visit        Follow-up notes from  "your care team     Return in about 2 weeks (around 11/10/2017) for contact lens check.      Who to contact     If you have questions or need follow up information about today's clinic visit or your schedule please contact [facility] directly at [phone].  Normal or non-critical lab and imaging results will be communicated to you by MyChart, letter or phone within 4 business days after the clinic has received the results. If you do not hear from us within 7 days, please contact the clinic through MyChart or phone. If you have a critical or abnormal lab result, we will notify you by phone as soon as possible.  Submit refill requests through [employer] or call your pharmacy and they will forward the refill request to us. Please allow 3 business days for your refill to be completed.          Additional Information About Your Visit        MyChart Information     [employer] lets you send messages to your doctor, view your test results, renew your prescriptions, schedule appointments and more. To sign up, go to [email or web address] . Click on \"Log in\" on the left side of the screen, which will take you to the Welcome page. Then click on \"Sign up Now\" on the right side of the page.     You will be asked to enter the access code listed below, as well as some personal information. Please follow the directions to create your username and password.     Your access code is: [email or web address]  Expires: 2018 11:04 AM     Your access code will  in 90 days. If you need help or a new code, please call your [facility] clinic or [phone].        Care EveryWhere ID     This is your Care EveryWhere ID. This could be used by other organizations to access your [facility] medical records  [id number]        Your Vitals Were     Last Period                   2017            Blood Pressure from Last 3 Encounters:   10/06/17 131/88   17 (!) 144/91   17 139/84    Weight from Last 3 Encounters: "   10/06/17 90.7 kg (200 lb)   08/17/17 101.2 kg (223 lb)   08/09/17 101.2 kg (223 lb)              We Performed the Following     CONTACT LENS FITTING,BILAT     EYE EXAM (SIMPLE-NONBILLABLE)     REFRACTION        Primary Care Provider Office Phone # Fax #    Martínez Reynoso PA-C [phone]       [address] [facility] [address]        Equal Access to Services     [facility] : Hadii aad ku hadasho Soomaali, waaxda luqadaha, qaybta kaalmada adeegyada, waxay idiin hayaan adeeg kharash la'aan ah. So [facility] [phone].    ATENCIÓN: Si habla español, tiene a newsome disposición servicios gratuitos de asistencia lingüística. [facility] [phone].    We comply with applicable federal civil rights laws and [place] laws. We do not discriminate on the basis of race, color, national origin, age, disability, sex, sexual orientation, or gender identity.            Thank you!     Thank you for choosing [facility]  for your care. Our goal is always to provide you with excellent care. Hearing back from our patients is one way we can continue to improve our services. Please take a few minutes to complete the written survey that you may receive in the mail after your visit with us. Thank you!             Your Updated Medication List - Protect others around you: Learn how to safely use, store and throw away your medicines at www.disposemymeds.org.          This list is accurate as of: 10/27/17  3:11 PM.  Always use your most recent med list.                   Brand Name Dispense Instructions for use Diagnosis    busPIRone 5 MG tablet    BUSPAR    60 tablet    Take 1 tablet (5 mg) by mouth 2 times daily    Generalized anxiety disorder       doxylamine 25 MG Tabs tablet    UNISOM     Take 12.5 mg by mouth nightly as needed        norelgestromin-ethinyl estradiol 150-35 MCG/24HR patch    ORTHO EVRA    3 patch    Remove old patch and apply new patch onto the skin once a week for 3 weeks (21  days). Do not wear patch week 4 (days 22-28), then repeat.    Encounter for initial prescription of transdermal patch hormonal contraceptive device       PRENA1 PO

## 2017-10-27 NOTE — PROGRESS NOTES
"Chief Complaint   Patient presents with     COMPREHENSIVE EYE EXAM     Contact Lens Fitting     waiver signed/ fit fee not collected today        Previous contact lens wearer? Yes: Acuvue 2  Comfort of contact lenses :\"For the most part\"  Do not last for as long as they should - Pt looking for the cheapest she can get  Satisfied with current lenses: Yes - PT plans to do lasik in the beginning of the year        Last Eye Exam: Oct 2015  Dilated Previously: Declined dilation today    What are you currently using to see?  glasses and contacts    Distance Vision Acuity: Satisfied with vision    Near Vision Acuity: Satisfied with vision while reading  with glasses & contacts    Eye Comfort: good  Do you use eye drops? : No  Occupation or Hobbies: admin      Flower Herreraquist  Optometric Tech       Medical, surgical and family histories reviewed and updated 10/27/2017.       OBJECTIVE: See Ophthalmology exam    ASSESSMENT:    ICD-10-CM    1. Myopia of both eyes with astigmatism H52.13 EYE EXAM (SIMPLE-NONBILLABLE)    H52.203 REFRACTION     CONTACT LENS FITTING,BILAT      PLAN:     Patient Instructions     Oasys contact lenses given today. We will order AV 2 trials and call you when they come in. Please try not to sleep in your contact lenses.      Optometry Providers       Clinic Locations                                 Telephone Number   Dr. Mere Auguste   [facility]    [place] [facility]  [phone]     [facility] Hours:                [place] [facility] Hours:       [facility] Hours:  [address]   [address]     [address] [place] [address]   [place] [address]    [place] [address]  Phone: [phone]                    Phone [phone]                      Phone: [phone]                          Monday 8:00-7:00                          Monday 8:00-7:00                          " Monday 8:00-7:00              Tuesday 8:00-6:00                          Tuesday 8:00-7:00                          Tuesday 8:00-7:00              Wednesday 8:00-6:00                  Wednesday 8:00-7:00                   Wednesday 8:00-7:00      Thursday 8:00-6:00                        Thursday 8:00-7:00                         Thursday 8:00-7:00            Friday 8:00-5:00                              Friday 8:00-5:00                              Friday 8:00-5:00    Please log on to [email or web address] to order your contact lenses.  The link is found on the Eye Care [facility] Vision Services page.  As always, Thank you for trusting us with your health care needs!

## 2017-10-27 NOTE — PATIENT INSTRUCTIONS
Oasys contact lenses given today. We will order AV 2 trials and call you when they come in. Please try not to sleep in your contact lenses.      Optometry Providers       Clinic Locations                                 Telephone Number   Dr. Mere Auguste   [facility]  [phone]     [facility] Hours:                [place] [facility] Hours:       [facility] [place] [facility] Hours:  [address]   [address] [facility]     [address] [place] [address]   [place] [address]    [place] [address]  Phone: [phone]                    Phone [phone]                      Phone: [phone]                          Monday 8:00-7:00                          Monday 8:00-7:00                          Monday 8:00-7:00              Tuesday 8:00-6:00                          Tuesday 8:00-7:00                          Tuesday 8:00-7:00              Wednesday 8:00-6:00                  Wednesday 8:00-7:00                   Wednesday 8:00-7:00      Thursday 8:00-6:00                        Thursday 8:00-7:00                         Thursday 8:00-7:00            Friday 8:00-5:00                              Friday 8:00-5:00                              Friday 8:00-5:00    Please log on to [email or web address] to order your contact lenses.  The link is found on the Eye Care and Vision Services page.  As always, Thank you for trusting us with your health care needs!

## 2017-11-20 ENCOUNTER — TELEPHONE (OUTPATIENT)
Dept: OPTOMETRY | Facility: CLINIC | Age: 32
End: 2017-11-20

## 2017-11-21 NOTE — TELEPHONE ENCOUNTER
Brand Base Curve Diameter Sphere Dist VA   Right J&J Acuvue 2 8.3 14.0 -4.50 20/25-2   Left J&J Acuvue 2 8.3 14.0 -5.75 20/20-1     If she prefers these to Acuvue Oasys - she needs to call us to finalize - Dr. RAWLS already finalized acuvue oasys

## 2017-11-30 NOTE — TELEPHONE ENCOUNTER
LM for PT - we have acuvue oasys 2 here for her to try if she wants.  Give her all trials - we do not stock these.

## 2018-02-06 ENCOUNTER — TELEPHONE (OUTPATIENT)
Dept: FAMILY MEDICINE | Facility: CLINIC | Age: 33
End: 2018-02-06

## 2018-02-06 DIAGNOSIS — Z30.016 ENCOUNTER FOR INITIAL PRESCRIPTION OF TRANSDERMAL PATCH HORMONAL CONTRACEPTIVE DEVICE: ICD-10-CM

## 2018-02-06 DIAGNOSIS — F41.1 GENERALIZED ANXIETY DISORDER: ICD-10-CM

## 2018-02-06 NOTE — TELEPHONE ENCOUNTER
..Reason for Call:   prescription    Detailed comments: Patient called said she need an script sent to pharmacy for the birth control patch, and would like to know if the dosage on the BUSPAR can be upped.    Phone Number Patient can be reached at: Home number on file [phone] (home)    Best Time: anytime    Can we leave a detailed message on this number? YES    Call taken on 2/6/2018 at 9:49 AM by rAthur Uribe

## 2018-02-06 NOTE — TELEPHONE ENCOUNTER
PHQ-9 SCORE 3/27/2014 10/6/2017   Total Score 17 -   Total Score - 2     AMANDA-7 SCORE 3/27/2014   Total Score 16     Noted patient has not been seen by Dr. Richards since PP on 10-06-17.   Phone call to patient. Explained that Dr. Richards sent 1 year Rx to [facility] on 10-06-17 so patient should have RFs. Patient stated she will tell pharmacy.  Patient wondered what she should do about insomnia and anxiety. She stated she does not notice any improvement in sxs since starting Buspar. Recommended family practice. Offered to schedule an appt and patient stated she will call back to schedule. Tracey Nye RN, BAN

## 2018-02-09 RX ORDER — BUSPIRONE HYDROCHLORIDE 5 MG/1
10 TABLET ORAL 3 TIMES DAILY
Qty: 90 TABLET | Refills: 1 | Status: SHIPPED | OUTPATIENT
Start: 2018-02-09 | End: 2018-03-02

## 2018-02-09 RX ORDER — NORELGESTROMIN AND ETHINYL ESTRADIOL 35; 150 UG/MG; UG/MG
PATCH TRANSDERMAL
Qty: 9 PATCH | Refills: 3 | Status: SHIPPED | OUTPATIENT
Start: 2018-02-09 | End: 2019-04-29

## 2018-02-09 NOTE — TELEPHONE ENCOUNTER
Return call to patient- patient states she is now calling for a refill on the birth control patch.  Prescription for 3 months was sent on 10/6/17.  Forwarded to provider to advise.

## 2018-02-09 NOTE — TELEPHONE ENCOUNTER
Reason for Call:  Other prescription    Detailed comments: Pt calling to follow up to see if a refill on Pt's birth control can be sent for she still has not received Rx on that and would like to start by Monday. She would like a call back to confirm Rx has been sent.    Phone Number Patient can be reached at: Home number on file [phone] (home)    Best Time: anytime    Can we leave a detailed message on this number? YES    Call taken on 2/9/2018 at 1:50 PM by Cesar Cohen

## 2018-02-09 NOTE — TELEPHONE ENCOUNTER
Reason for Call:  Other prescription    Detailed comments: patient called her pharmacy back and they still say patient does not have refills..    Phone Number Patient can be reached at: Other phone number  Jose Bernice J ([facility]) [phone] (H)     :      Best Time: any    Can we leave a detailed message on this number? YES    Call taken on 2/9/2018 at 11:12 AM by Mayrana Almeida

## 2018-02-09 NOTE — TELEPHONE ENCOUNTER
Pt calling for refill of buspar.  Was prescribed 10/2017 at her postpartum visit. Was to follow up in 1 month, but did not do that. Pt stated that she hasn't seen any improvement.  Do you want to refill buspar or change to a different medication?  Or send a short term refill and recommend that pt see FP for further evaluation?  Vi Brewster, RN

## 2018-02-09 NOTE — TELEPHONE ENCOUNTER
I have sent a prescription refill at a higher dose. Patient may try that. Advise follow-up with FP for chronic anxiety thereafter. Please notify.  Eddie Richards MD

## 2018-03-02 ENCOUNTER — OFFICE VISIT (OUTPATIENT)
Dept: FAMILY MEDICINE | Facility: CLINIC | Age: 33
End: 2018-03-02
Payer: COMMERCIAL

## 2018-03-02 ENCOUNTER — TELEPHONE (OUTPATIENT)
Dept: FAMILY MEDICINE | Facility: CLINIC | Age: 33
End: 2018-03-02

## 2018-03-02 VITALS
HEART RATE: 84 BPM | DIASTOLIC BLOOD PRESSURE: 89 MMHG | SYSTOLIC BLOOD PRESSURE: 141 MMHG | BODY MASS INDEX: 31.96 KG/M2 | RESPIRATION RATE: 16 BRPM | TEMPERATURE: 97 F | WEIGHT: 195 LBS | OXYGEN SATURATION: 99 %

## 2018-03-02 DIAGNOSIS — F41.1 GENERALIZED ANXIETY DISORDER: Primary | ICD-10-CM

## 2018-03-02 DIAGNOSIS — Z72.820 POOR SLEEP: ICD-10-CM

## 2018-03-02 PROCEDURE — 99213 OFFICE O/P EST LOW 20 MIN: CPT | Performed by: NURSE PRACTITIONER

## 2018-03-02 RX ORDER — HYDROXYZINE HYDROCHLORIDE 10 MG/1
10 TABLET, FILM COATED ORAL EVERY 6 HOURS PRN
Qty: 60 TABLET | Refills: 1 | Status: SHIPPED | OUTPATIENT
Start: 2018-03-02 | End: 2020-05-19

## 2018-03-02 ASSESSMENT — ANXIETY QUESTIONNAIRES
GAD7 TOTAL SCORE: 17
3. WORRYING TOO MUCH ABOUT DIFFERENT THINGS: NEARLY EVERY DAY
2. NOT BEING ABLE TO STOP OR CONTROL WORRYING: NEARLY EVERY DAY
6. BECOMING EASILY ANNOYED OR IRRITABLE: NEARLY EVERY DAY
1. FEELING NERVOUS, ANXIOUS, OR ON EDGE: MORE THAN HALF THE DAYS
7. FEELING AFRAID AS IF SOMETHING AWFUL MIGHT HAPPEN: SEVERAL DAYS
IF YOU CHECKED OFF ANY PROBLEMS ON THIS QUESTIONNAIRE, HOW DIFFICULT HAVE THESE PROBLEMS MADE IT FOR YOU TO DO YOUR WORK, TAKE CARE OF THINGS AT HOME, OR GET ALONG WITH OTHER PEOPLE: VERY DIFFICULT
5. BEING SO RESTLESS THAT IT IS HARD TO SIT STILL: MORE THAN HALF THE DAYS

## 2018-03-02 ASSESSMENT — PATIENT HEALTH QUESTIONNAIRE - PHQ9: 5. POOR APPETITE OR OVEREATING: NEARLY EVERY DAY

## 2018-03-02 ASSESSMENT — PAIN SCALES - GENERAL: PAINLEVEL: NO PAIN (0)

## 2018-03-02 NOTE — PROGRESS NOTES
"  SUBJECTIVE:   Bernice Garcia is a 32 year old female who presents to clinic today for the following health issues:      Anxiety Follow-Up    Status since last visit: Worsened     Other associated symptoms:sleep problem (5 hours per night)(trouble falling, staying), panic attack    Complicating factors:   Significant life event: Yes-  6 month old   Current substance abuse: None  Depression symptoms: Yes-    AMANDA-7 SCORE 3/27/2014 3/2/2018   Total Score 16 -   Total Score - 17       AMANDA-7      Amount of exercise or physical activity: None    Problems taking medications regularly: No    Medication side effects: Yes - Buspar (since her increase to 30 MG daily of the Buspar - dizziness, nausea)    - pt tried to lower dose to 22.5 MG daily and still had sx's    Pt also tried 15 MG daily - no side effects noted but she did not notice any improvement in her symptoms.    Diet: regular (no restrictions)    Patient states she cannot \"shut her mind off\".  She \"just wants to be able to sleep\".  With previous diagnosis of anxiety and depression, she did not take medication but was referred to therapy. She did not do therapy.  Feels as though this time is worse and would like to try medication and therapy.  She is not breastfeeding.  She does drink alcohol but not in excess. She denies smoking or drug use.  Denies history of trauma or abuse.  Denies suicidal ideation or homicidal ideation.      Problem list and histories reviewed & adjusted, as indicated.  Additional history: as documented    Patient Active Problem List   Diagnosis     Generalized anxiety disorder     Past Surgical History:   Procedure Laterality Date     NO HISTORY OF SURGERY         Social History   Substance Use Topics     Smoking status: Former Smoker     Types: Cigarettes     Quit date: 1/1/2016     Smokeless tobacco: Never Used     Alcohol use Yes      Comment: not in PG     Family History   Problem Relation Age of Onset     Alzheimer Disease " Maternal Grandmother      DIABETES Maternal Grandfather      Alzheimer Disease Maternal Grandfather      Depression Sister      CANCER Daughter      Glaucoma No family hx of      Macular Degeneration No family hx of          Current Outpatient Prescriptions   Medication Sig Dispense Refill     hydrOXYzine (ATARAX) 10 MG tablet Take 1 tablet (10 mg) by mouth every 6 hours as needed for anxiety (or sleep) 60 tablet 1     sertraline (ZOLOFT) 50 MG tablet Take 1/2 tablet (25 mg) for 1-2 weeks, then increase to 1 tablet orally daily 30 tablet 3     norelgestromin-ethinyl estradiol (ORTHO EVRA) 150-35 MCG/24HR patch Remove old patch and apply new patch onto the skin once a week for 3 weeks (21 days). Do not wear patch week 4 (days 22-28), then repeat. 9 patch 3     No Known Allergies  BP Readings from Last 3 Encounters:   03/02/18 141/89   10/06/17 131/88   08/17/17 (!) 144/91    Wt Readings from Last 3 Encounters:   03/02/18 195 lb (88.5 kg)   10/06/17 200 lb (90.7 kg)   08/17/17 223 lb (101.2 kg)                    Reviewed and updated as needed this visit by clinical staff  Tobacco  Allergies  Meds  Med Hx  Surg Hx  Fam Hx  Soc Hx      Reviewed and updated as needed this visit by Provider         ROS:  Constitutional, HEENT, cardiovascular, pulmonary, gi and gu systems are negative, except as otherwise noted.    OBJECTIVE:     /89 (BP Location: Left arm, Patient Position: Sitting, Cuff Size: Adult Large)  Pulse 84  Temp 97  F (36.1  C) (Oral)  Resp 16  Wt 195 lb (88.5 kg)  LMP 02/05/2018  SpO2 99%  BMI 31.96 kg/m2  Body mass index is 31.96 kg/(m^2).  GENERAL: healthy, alert and no distress  PSYCH: tearful, anxious and fatigued    Diagnostic Test Results:  none     ASSESSMENT/PLAN:     1. Generalized anxiety disorder  Buspar not effective for patient so will stop and trial course of below.  Follow up one month.  - hydrOXYzine (ATARAX) 10 MG tablet; Take 1 tablet (10 mg) by mouth every 6 hours as  needed for anxiety (or sleep)  Dispense: 60 tablet; Refill: 1  - sertraline (ZOLOFT) 50 MG tablet; Take 1/2 tablet (25 mg) for 1-2 weeks, then increase to 1 tablet orally daily  Dispense: 30 tablet; Refill: 3    2. Poor sleep  - hydrOXYzine (ATARAX) 10 MG tablet; Take 1 tablet (10 mg) by mouth every 6 hours as needed for anxiety (or sleep)  Dispense: 60 tablet; Refill: 1      FUTURE APPOINTMENTS:       - Follow-up visit in 1 month    Patient Instructions   Anxiety:  I entered a referral to therapy, they will call to reach out to you.  Take 1/2 tablet sertraline daily for two weeks, then increase to a full tablet.  This is the one you will take daily.  Remember, can cause headache, and diarrhea for first two weeks- this should resolve.  Can also cause difficulty achieving orgasm- we can work around this like we discussed.  While we wait for sertraline to take effect (2 weeks) you can take Hydroxyzine every 6 hour as needed for anxiety and/or sleep.  During the daytime, try taking just 1 tablet (10 mg).  At night, for sleep, you can take 1-3 tablets (10-30 mg).      Come back in one month or follow up via e-visit or telephone visit.    Additional information about SSRIs (Zoloft):  SSRIs (selective serotonin reuptake inhibitors) are very effective medications in treating both anxiety and depression and they are typically very well tolerated by patients.    SSRI side effects include increased anxiety, dizziness, nausea, diarrhea, and headache for the first two weeks of treatment.  These typically resolve after two weeks. There are some sexual side effects including decreased libido and inability to achieve orgasm.  Once you have been on the medication for a while, I am fine with you skipping 1-2 doses a week to counteract this side effect.  If that doesn't work, there are some other options that we can discuss.  Otherwise, it is important that you take your medication daily.  You will not notice an improvement in  anxiety or depression right away.  Typically, people start to notice a difference in about 2-3 weeks.  Once you are feeling better, I recommend we continue the medication for 4-6 months and then reevaluating together.  Do not stop medication abruptly as this can cause symptoms such as increased anxiety, dizziness, headache, and nausea.  When you are ready to stop the medication, we will make a plan to decrease it slowly to avoid these effects.     There is a black box warning on antidepressants that says they can sometimes lead to suicidal thoughts.  This effect is very rare, BUT, ff you experience this, stop the medication immediately and go to the emergency room.        KYLE Baca [facility]

## 2018-03-02 NOTE — PATIENT INSTRUCTIONS
Anxiety:  I entered a referral to therapy, they will call to reach out to you.  Take 1/2 tablet sertraline daily for two weeks, then increase to a full tablet.  This is the one you will take daily.  Remember, can cause headache, and diarrhea for first two weeks- this should resolve.  Can also cause difficulty achieving orgasm- we can work around this like we discussed.  While we wait for sertraline to take effect (2 weeks) you can take Hydroxyzine every 6 hour as needed for anxiety and/or sleep.  During the daytime, try taking just 1 tablet (10 mg).  At night, for sleep, you can take 1-3 tablets (10-30 mg).      Come back in one month or follow up via e-visit or telephone visit.    Additional information about SSRIs (Zoloft):  SSRIs (selective serotonin reuptake inhibitors) are very effective medications in treating both anxiety and depression and they are typically very well tolerated by patients.    SSRI side effects include increased anxiety, dizziness, nausea, diarrhea, and headache for the first two weeks of treatment.  These typically resolve after two weeks. There are some sexual side effects including decreased libido and inability to achieve orgasm.  Once you have been on the medication for a while, I am fine with you skipping 1-2 doses a week to counteract this side effect.  If that doesn't work, there are some other options that we can discuss.  Otherwise, it is important that you take your medication daily.  You will not notice an improvement in anxiety or depression right away.  Typically, people start to notice a difference in about 2-3 weeks.  Once you are feeling better, I recommend we continue the medication for 4-6 months and then reevaluating together.  Do not stop medication abruptly as this can cause symptoms such as increased anxiety, dizziness, headache, and nausea.  When you are ready to stop the medication, we will make a plan to decrease it slowly to avoid these effects.     There is a  black box warning on antidepressants that says they can sometimes lead to suicidal thoughts.  This effect is very rare, BUT, ff you experience this, stop the medication immediately and go to the emergency room.

## 2018-03-02 NOTE — MR AVS SNAPSHOT
After Visit Summary   3/2/2018    Bernice Garcia    MRN: [id number]           Patient Information     Date Of Birth          1985        Visit Information        Provider Department      3/2/2018 11:20 AM Reena Mcclendon APRN [facility]        Today's Diagnoses     Generalized anxiety disorder    -  1      Care Instructions    Anxiety:  I entered a referral to therapy, they will call to reach out to you.  Take 1/2 tablet sertraline daily for two weeks, then increase to a full tablet.  This is the one you will take daily.  Remember, can cause headache, and diarrhea for first two weeks- this should resolve.  Can also cause difficulty achieving orgasm- we can work around this like we discussed.  While we wait for sertraline to take effect (2 weeks) you can take Hydroxyzine every 6 hour as needed for anxiety and/or sleep.  During the daytime, try taking just 1 tablet (10 mg).  At night, for sleep, you can take 1-3 tablets (10-30 mg).      Come back in one month or follow up via e-visit or telephone visit.    Additional information about SSRIs (Zoloft):  SSRIs (selective serotonin reuptake inhibitors) are very effective medications in treating both anxiety and depression and they are typically very well tolerated by patients.    SSRI side effects include increased anxiety, dizziness, nausea, diarrhea, and headache for the first two weeks of treatment.  These typically resolve after two weeks. There are some sexual side effects including decreased libido and inability to achieve orgasm.  Once you have been on the medication for a while, I am fine with you skipping 1-2 doses a week to counteract this side effect.  If that doesn't work, there are some other options that we can discuss.  Otherwise, it is important that you take your medication daily.  You will not notice an improvement in anxiety or depression right away.  Typically, people start to notice a  "difference in about 2-3 weeks.  Once you are feeling better, I recommend we continue the medication for 4-6 months and then reevaluating together.  Do not stop medication abruptly as this can cause symptoms such as increased anxiety, dizziness, headache, and nausea.  When you are ready to stop the medication, we will make a plan to decrease it slowly to avoid these effects.     There is a black box warning on antidepressants that says they can sometimes lead to suicidal thoughts.  This effect is very rare, BUT, ff you experience this, stop the medication immediately and go to the emergency room.            Follow-ups after your visit        Who to contact     If you have questions or need follow up information about today's clinic visit or your schedule please contact [facility] directly at [phone].  Normal or non-critical lab and imaging results will be communicated to you by Stratoscalehart, letter or phone within 4 business days after the clinic has received the results. If you do not hear from us within 7 days, please contact the clinic through Stratoscalehart or phone. If you have a critical or abnormal lab result, we will notify you by phone as soon as possible.  Submit refill requests through Innovand or call your pharmacy and they will forward the refill request to us. Please allow 3 business days for your refill to be completed.          Additional Information About Your Visit        Innovand Information     Innovand lets you send messages to your doctor, view your test results, renew your prescriptions, schedule appointments and more. To sign up, go to [email or web address] . Click on \"Log in\" on the left side of the screen, which will take you to the Welcome page. Then click on \"Sign up Now\" on the right side of the page.     You will be asked to enter the access code listed below, as well as some personal information. Please follow the directions to create your username and password.     Your " access code is: [email or web address]  Expires: 2018 11:51 AM     Your access code will  in 90 days. If you need help or a new code, please call your [place] clinic or [phone].        Care EveryWhere ID     This is your Care EveryWhere ID. This could be used by other organizations to access your [place] medical records  [id number]        Your Vitals Were     Pulse Temperature Respirations Last Period Pulse Oximetry BMI (Body Mass Index)    84 97  F (36.1  C) (Oral) 16 2018 99% 31.96 kg/m2       Blood Pressure from Last 3 Encounters:   18 141/89   10/06/17 131/88   17 (!) 144/91    Weight from Last 3 Encounters:   18 195 lb (88.5 kg)   10/06/17 200 lb (90.7 kg)   17 223 lb (101.2 kg)              Today, you had the following     No orders found for display         Today's Medication Changes          These changes are accurate as of 3/2/18 11:51 AM.  If you have any questions, ask your nurse or doctor.               Start taking these medicines.        Dose/Directions    hydrOXYzine 10 MG tablet   Commonly known as:  ATARAX   Used for:  Generalized anxiety disorder   Started by:  Reena Mcclendon APRN CNP        Dose:  10 mg   Take 1 tablet (10 mg) by mouth every 6 hours as needed for anxiety (or sleep)   Quantity:  60 tablet   Refills:  1       sertraline 50 MG tablet   Commonly known as:  ZOLOFT   Used for:  Generalized anxiety disorder   Started by:  Reena Mcclendon APRN CNP        Take 1/2 tablet (25 mg) for 1-2 weeks, then increase to 1 tablet orally daily   Quantity:  30 tablet   Refills:  3         Stop taking these medicines if you haven't already. Please contact your care team if you have questions.     busPIRone 5 MG tablet   Commonly known as:  BUSPAR   Stopped by:  Reena Mcclendon APRN CNP                Where to get your medicines      These medications were sent to [facility] [address] [place] [address] [address] AT  [facility] [address], [place] [facility] [address]     Phone:  [phone]     hydrOXYzine 10 MG tablet    sertraline 50 MG tablet                Primary Care Provider Office Phone # Fax #    Martínez Reynoso PA-C [phone] [address] [facility] [address]  [facility] [address]        Equal Access to Services     MARVIN RUFFIN : Hadii aad ku hadasho Soomaali, waaxda luqadaha, qaybta kaalmada adeegyada, waxay idiin hayaan adeeg kharash la'aan . So [facility] [phone].    ATENCIÓN: Si habla español, tiene a newsome disposición servicios gratuitos de asistencia lingüística. Greg al [phone].    We comply with applicable federal civil rights laws and [place] laws. We do not discriminate on the basis of race, color, national origin, age, disability, sex, sexual orientation, or gender identity.            Thank you!     Thank you for choosing [facility]  for your care. Our goal is always to provide you with excellent care. Hearing back from our patients is one way we can continue to improve our services. Please take a few minutes to complete the written survey that you may receive in the mail after your visit with us. Thank you!             Your Updated Medication List - Protect others around you: Learn how to safely use, store and throw away your medicines at www.disposemymeds.org.          This list is accurate as of 3/2/18 11:51 AM.  Always use your most recent med list.                   Brand Name Dispense Instructions for use Diagnosis    hydrOXYzine 10 MG tablet    ATARAX    60 tablet    Take 1 tablet (10 mg) by mouth every 6 hours as needed for anxiety (or sleep)    Generalized anxiety disorder       norelgestromin-ethinyl estradiol 150-35 MCG/24HR patch    ORTHO EVRA    9 patch    Remove old patch and apply new patch onto the skin once a week for 3 weeks (21 days). Do not wear patch week 4 (days 22-28), then repeat.    Encounter for initial  prescription of transdermal patch hormonal contraceptive device       sertraline 50 MG tablet    ZOLOFT    30 tablet    Take 1/2 tablet (25 mg) for 1-2 weeks, then increase to 1 tablet orally daily    Generalized anxiety disorder

## 2018-03-02 NOTE — TELEPHONE ENCOUNTER
"Patient called in to clinic this morning asking to speak with nurse about high anxiety and panic attack like feeling. Call was transferred to triage RN by call center.     Bernice Garcia is a 32 year old female who calls with anxiety, panic attack like feelings.    NURSING ASSESSMENT:  Description:  Pt reporting feeling very anxious lately and very emotional, like wanting to cry all the time. Pt stating she is a new mom to a 6 month old baby and has been getting very little sleep at night as well. Feels like a panic attack may come on at any minute and states she feels \"borderline depressed\". Pt states that a lot of the problem may be lack of sleep, but that she is having a very hard time falling asleep at night. Pt reports last night laying in bed from 8:30 pm-Midnight without falling asleep and then her baby woke up at 5:00am and she has been awake ever since. Pt asking for help with dealing with these issues and wanting to see a provider today to discuss further.  Onset/duration:  Months, but worsening over time  Precip. factors:  New mom, first baby-6 mo old  Associated symptoms:  Tense body, very emotional, feels like hyperventilating at times, tired all the time. Pt denies any suicidal ideations and denies any thought or plan to harm self or others, including infant.  Improves/worsens symptoms:  Nothing at this time. Pt has tried OTC sleep aids, including melatonin and even Nyquil to help sleep but does not work. Pt was prescribed Buspirone 5 mg tab by her OB provider, but the dosage was not helping so it was increased and then it was too strong and made pt feel woozy and lightheaded so was titrated back down and then wasn't helping again so pt just stopped taking it. Pt reports not taking it for the last couple days.       Last exam/Treatment:  Postpartum visit 10/6/17 with OB provider. Pt does not have a primary care provider at this time.   Allergies: No Known Allergies    MEDICATIONS:   Taking " medication(s) as prescribed? Stopped taking the prescribed Buspirone, but taking others listed.  Taking over the counter medication(s?) Has tried several OTC sleep aids, but none are working  Medication(s) improving/managing symptoms?  No        NURSING PLAN: Nursing advice to patient Advise to be seen today by a provider to evaluate symptoms and discuss possible medications for mood    RECOMMENDED DISPOSITION:  See in 24 hours - Recommend be seen today by a provider. Pt does not have a PCP and would like to establish with one. Pt wanted to try and sleep this morning as she had asked her  to come home from work to help her with the baby and so that she could set up appointment to see a provider today. Pt stated spouse was on way home at time of call and she wanted to try to sleep for a little while and see if helps her feel better. Pt asking for later morning appointment. Pt scheduled with Reena Mcclendon CNP for 11:20 am at [facility]. Advised pt to contact clinic and speak with nurse if feels symptoms worsening or has panic attack occur, in the mean time. Also advised to seek ER care if symptoms turn severe and develops any hallucinations, suicidal thoughts, confusion or paranoia. Pt and RN agreed with plan.      Will comply with recommendation: Yes     If further questions/concerns or if symptoms do not improve, worsen or new symptoms develop, call your PCP or [facility] as soon as possible.      Guideline used:  Telephone Triage Protocols for Nurses, Fifth Edition, Mary Fang RN

## 2018-03-03 ASSESSMENT — ANXIETY QUESTIONNAIRES: GAD7 TOTAL SCORE: 17

## 2018-03-03 ASSESSMENT — PATIENT HEALTH QUESTIONNAIRE - PHQ9: SUM OF ALL RESPONSES TO PHQ QUESTIONS 1-9: 15

## 2018-05-02 ENCOUNTER — MYC MEDICAL ADVICE (OUTPATIENT)
Dept: FAMILY MEDICINE | Facility: CLINIC | Age: 33
End: 2018-05-02

## 2018-05-02 DIAGNOSIS — F41.1 GENERALIZED ANXIETY DISORDER: ICD-10-CM

## 2018-05-02 NOTE — TELEPHONE ENCOUNTER
E-visit instructions given to Bernice to further discuss medication questions/dosage change.     Rodolfo Moon RN, BSN

## 2018-05-18 ENCOUNTER — TELEPHONE (OUTPATIENT)
Dept: FAMILY MEDICINE | Facility: CLINIC | Age: 33
End: 2018-05-18

## 2018-05-18 RX ORDER — SERTRALINE HYDROCHLORIDE 100 MG/1
100 TABLET, FILM COATED ORAL DAILY
Qty: 30 TABLET | Refills: 3 | Status: SHIPPED | OUTPATIENT
Start: 2018-05-18 | End: 2018-09-06

## 2018-05-18 NOTE — TELEPHONE ENCOUNTER
Patient is asking if provider can increase dose as the one she was given was on a trial basis    She doesn't feel she needs a visit, would just like to try the higher dose    She is about due for refill and doesn't want to buy the lower dose if she is getting it increased    Can this message be sent to provider to respond    Patient has felt a difference with the medication as far as mood, but not as much help with  the anxiety and sleeping issues    Ok to leave message

## 2018-09-06 DIAGNOSIS — F41.1 GENERALIZED ANXIETY DISORDER: ICD-10-CM

## 2018-09-06 NOTE — TELEPHONE ENCOUNTER
"Requested Prescriptions   Pending Prescriptions Disp Refills     sertraline (ZOLOFT) 100 MG tablet 30 tablet 3     Sig: Take 1 tablet (100 mg) by mouth daily Take 1/2 tablet (25 mg) for 1-2 weeks, then increase to 1 tablet orally daily    SSRIs Protocol Passed    9/6/2018  3:33 PM       Passed - Recent (12 mo) or future (30 days) visit within the authorizing provider's specialty    Patient had office visit in the last 12 months or has a visit in the next 30 days with authorizing provider or within the authorizing provider's specialty.  See \"Patient Info\" tab in inbasket, or \"Choose Columns\" in Meds & Orders section of the refill encounter.           Passed - Patient is age 18 or older       Passed - No active pregnancy on record       Passed - No positive pregnancy test in last 12 months        Last Written Prescription Date:  5/18/18  Last Fill Quantity: 30,  # refills: 3   Last office visit: 3/2/2018 with prescribing provider:  Hakan   Future Office Visit:      "

## 2018-09-11 RX ORDER — SERTRALINE HYDROCHLORIDE 100 MG/1
100 TABLET, FILM COATED ORAL DAILY
Qty: 30 TABLET | Refills: 3 | Status: SHIPPED | OUTPATIENT
Start: 2018-09-11 | End: 2019-02-05

## 2018-09-11 NOTE — TELEPHONE ENCOUNTER
Routing refill request to provider for review/approval because:  Please advise on directions  Serina Spears RN

## 2018-09-11 NOTE — TELEPHONE ENCOUNTER
Due for telephone visit, evisit or in-person to follow up on depression/anxiety.  Also appears to be a gap in the medication.  Will refill x 1 month only.  Please notify patient of need for appointment.

## 2018-10-16 ENCOUNTER — ALLIED HEALTH/NURSE VISIT (OUTPATIENT)
Dept: NURSING | Facility: CLINIC | Age: 33
End: 2018-10-16
Payer: COMMERCIAL

## 2018-10-16 DIAGNOSIS — Z23 NEED FOR PROPHYLACTIC VACCINATION AND INOCULATION AGAINST INFLUENZA: Primary | ICD-10-CM

## 2018-10-16 PROCEDURE — 99207 ZZC NO CHARGE NURSE ONLY: CPT

## 2018-10-16 PROCEDURE — 90471 IMMUNIZATION ADMIN: CPT

## 2018-10-16 PROCEDURE — 90686 IIV4 VACC NO PRSV 0.5 ML IM: CPT

## 2018-10-16 NOTE — PROGRESS NOTES

## 2018-10-16 NOTE — MR AVS SNAPSHOT
After Visit Summary   10/16/2018    Bernice Garcia    MRN: [id number]           Patient Information     Date Of Birth          1985        Visit Information        Provider Department      10/16/2018 12:40 PM [facility]        Today's Diagnoses     Need for prophylactic vaccination and inoculation against influenza    -  1       Follow-ups after your visit        Who to contact     If you have questions or need follow up information about today's clinic visit or your schedule please contact [facility] directly at [phone].  Normal or non-critical lab and imaging results will be communicated to you by Soicoshart, letter or phone within 4 business days after the clinic has received the results. If you do not hear from us within 7 days, please contact the clinic through Primeksst or phone. If you have a critical or abnormal lab result, we will notify you by phone as soon as possible.  Submit refill requests through ProteoMediX or call your pharmacy and they will forward the refill request to us. Please allow 3 business days for your refill to be completed.          Additional Information About Your Visit        MyChart Information     ProteoMediX gives you secure access to your electronic health record. If you see a primary care provider, you can also send messages to your care team and make appointments. If you have questions, please call your primary care clinic.  If you do not have a primary care provider, please call [phone] and they will assist you.        Care EveryWhere ID     This is your Care EveryWhere ID. This could be used by other organizations to access your [place] medical records  [id number]         Blood Pressure from Last 3 Encounters:   03/02/18 141/89   10/06/17 131/88   08/17/17 (!) 144/91    Weight from Last 3 Encounters:   03/02/18 195 lb (88.5 kg)   10/06/17 200 lb (90.7 kg)   08/17/17 223 lb (101.2 kg)              We  Performed the Following     FLU VACCINE, SPLIT VIRUS, IM (QUADRIVALENT) [14682]- >3 YRS     Vaccine Administration, Initial [70696]        Primary Care Provider Office Phone # Fax #    Martínez Reynoso PA-C [phone] [address] [address]        Equal Access to Services     [facility] AUGUSTIN : Hadii aad ku hadasho Soomaali, waaxda luqadaha, qaybta kaalmada adeegyada, waxay idiin hayaan adeeg kharash latitusn . So [facility] [phone].    ATENCIÓN: Si habla español, tiene a newsome disposición servicios gratuitos de asistencia lingüística. Greg al [phone].    We comply with applicable federal civil rights laws and [place] laws. We do not discriminate on the basis of race, color, national origin, age, disability, sex, sexual orientation, or gender identity.            Thank you!     Thank you for choosing [facility]  for your care. Our goal is always to provide you with excellent care. Hearing back from our patients is one way we can continue to improve our services. Please take a few minutes to complete the written survey that you may receive in the mail after your visit with us. Thank you!             Your Updated Medication List - Protect others around you: Learn how to safely use, store and throw away your medicines at www.disposemymeds.org.          This list is accurate as of 10/16/18  2:01 PM.  Always use your most recent med list.                   Brand Name Dispense Instructions for use Diagnosis    hydrOXYzine 10 MG tablet    ATARAX    60 tablet    Take 1 tablet (10 mg) by mouth every 6 hours as needed for anxiety (or sleep)    Generalized anxiety disorder, Poor sleep       norelgestromin-ethinyl estradiol 150-35 MCG/24HR patch    ORTHO EVRA    9 patch    Remove old patch and apply new patch onto the skin once a week for 3 weeks (21 days). Do not wear patch week 4 (days 22-28), then repeat.    Encounter for initial prescription of  transdermal patch hormonal contraceptive device       sertraline 100 MG tablet    ZOLOFT    30 tablet    Take 1 tablet (100 mg) by mouth daily    Generalized anxiety disorder

## 2019-02-04 ENCOUNTER — TELEPHONE (OUTPATIENT)
Dept: FAMILY MEDICINE | Facility: CLINIC | Age: 34
End: 2019-02-04

## 2019-02-04 DIAGNOSIS — F41.1 GENERALIZED ANXIETY DISORDER: ICD-10-CM

## 2019-02-04 NOTE — TELEPHONE ENCOUNTER
"Requested Prescriptions   Pending Prescriptions Disp Refills     sertraline (ZOLOFT) 100 MG tablet 30 tablet 3    Last Written Prescription Date:  9/11/18  Last Fill Quantity: 30,  # refills: 3   Last Office Visit with [facility], [facility] or [facility] prescribing provider:  3/2/18   Future Office Visit:      Sig: Take 1 tablet (100 mg) by mouth daily    SSRIs Protocol Passed - 2/4/2019  1:47 PM       Passed - Recent (12 mo) or future (30 days) visit within the authorizing provider's specialty    Patient had office visit in the last 12 months or has a visit in the next 30 days with authorizing provider or within the authorizing provider's specialty.  See \"Patient Info\" tab in inbasket, or \"Choose Columns\" in Meds & Orders section of the refill encounter.             Passed - Medication is active on med list       Passed - Patient is age 18 or older       Passed - No active pregnancy on record       Passed - No positive pregnancy test in last 12 months          "

## 2019-02-05 RX ORDER — SERTRALINE HYDROCHLORIDE 100 MG/1
100 TABLET, FILM COATED ORAL DAILY
Qty: 30 TABLET | Refills: 0 | Status: SHIPPED | OUTPATIENT
Start: 2019-02-05 | End: 2019-03-26

## 2019-02-05 RX ORDER — SERTRALINE HYDROCHLORIDE 100 MG/1
100 TABLET, FILM COATED ORAL DAILY
Qty: 30 TABLET | Refills: 3 | OUTPATIENT
Start: 2019-02-05

## 2019-02-05 NOTE — TELEPHONE ENCOUNTER
Called and spoke to the patient and explained Rx sent to her pharmacy and that she is due for a early appt, offer to help schedule, patient declined and states that she will call back.  Marlen uGtierrez MA/  For Teams Audrey

## 2019-02-05 NOTE — TELEPHONE ENCOUNTER
Reason for Call:  Other prescription    Detailed comments: Pt calling to follow up on medication request and would like to see if Reena Mcclendon will send in Rx to Pharmacy as soon as possible.    Phone Number Patient can be reached at: Home number on file [phone] (home)    Best Time: anytime    Can we leave a detailed message on this number? YES    Call taken on 2/5/2019 at 10:58 AM by Cesar Cohen

## 2019-02-05 NOTE — TELEPHONE ENCOUNTER
Medication is being filled for 1 time refill only due to:  Patient needs to be seen because due for yearly visit in March 2019..     TC to please call patient and schedule yearly physical appointment. Thank you.    Rodolfo Moon RN, BSN

## 2019-03-14 ENCOUNTER — MYC REFILL (OUTPATIENT)
Dept: FAMILY MEDICINE | Facility: CLINIC | Age: 34
End: 2019-03-14

## 2019-03-14 DIAGNOSIS — F41.1 GENERALIZED ANXIETY DISORDER: ICD-10-CM

## 2019-03-14 NOTE — TELEPHONE ENCOUNTER
"Requested Prescriptions   Pending Prescriptions Disp Refills     sertraline (ZOLOFT) 100 MG tablet  Last Written Prescription Date:  02/05/19  Last Fill Quantity: 30,  # refills: 0   Last Office Visit with [facility], [facility] or [facility] prescribing provider:  03/02/18-Hakan   Future Office Visit:    30 tablet 0     Sig: Take 1 tablet (100 mg) by mouth daily    SSRIs Protocol Failed - 3/14/2019 11:18 AM       Failed - Recent (12 mo) or future (30 days) visit within the authorizing provider's specialty    Patient had office visit in the last 12 months or has a visit in the next 30 days with authorizing provider or within the authorizing provider's specialty.  See \"Patient Info\" tab in inbasket, or \"Choose Columns\" in Meds & Orders section of the refill encounter.             Passed - Medication is active on med list       Passed - Patient is age 18 or older       Passed - No active pregnancy on record       Passed - No positive pregnancy test in last 12 months          "

## 2019-03-18 RX ORDER — SERTRALINE HYDROCHLORIDE 100 MG/1
100 TABLET, FILM COATED ORAL DAILY
Qty: 30 TABLET | Refills: 0 | OUTPATIENT
Start: 2019-03-18

## 2019-03-18 NOTE — TELEPHONE ENCOUNTER
Routing refill request to provider for review/approval because:  Patient needs to be seen because it has been more than 1 year since last office visit.  Joaquina Jin RN

## 2019-03-26 RX ORDER — SERTRALINE HYDROCHLORIDE 100 MG/1
100 TABLET, FILM COATED ORAL DAILY
Qty: 30 TABLET | Refills: 0 | Status: SHIPPED | OUTPATIENT
Start: 2019-03-26 | End: 2019-04-29

## 2019-03-26 NOTE — TELEPHONE ENCOUNTER
Multiple requests for this medication  Since 3/5/19    Reena Mcclendon filled this on 2/5/19 and patient is asking her for refill    She didn't know she needed appointment     So she made appointment but is not out of medication  Can you refill now to [facility] [place] [phone]    Thank you    Do not route to ALDAIR Reynoso PA-C  As patient sees Hakan now

## 2019-03-26 NOTE — TELEPHONE ENCOUNTER
Pt has an appt on 4/1/2019 with Reena Mcclendon. Routing to provider to advise if she can send no refill?. Kal, CMA

## 2019-04-29 ENCOUNTER — OFFICE VISIT (OUTPATIENT)
Dept: FAMILY MEDICINE | Facility: CLINIC | Age: 34
End: 2019-04-29
Payer: COMMERCIAL

## 2019-04-29 VITALS
WEIGHT: 180 LBS | DIASTOLIC BLOOD PRESSURE: 83 MMHG | OXYGEN SATURATION: 97 % | TEMPERATURE: 99 F | BODY MASS INDEX: 29.99 KG/M2 | HEART RATE: 90 BPM | HEIGHT: 65 IN | SYSTOLIC BLOOD PRESSURE: 132 MMHG

## 2019-04-29 DIAGNOSIS — F41.1 GENERALIZED ANXIETY DISORDER: ICD-10-CM

## 2019-04-29 DIAGNOSIS — Z00.00 ENCOUNTER FOR ROUTINE ADULT HEALTH EXAMINATION WITHOUT ABNORMAL FINDINGS: Primary | ICD-10-CM

## 2019-04-29 DIAGNOSIS — Z13.6 CARDIOVASCULAR SCREENING; LDL GOAL LESS THAN 130: ICD-10-CM

## 2019-04-29 DIAGNOSIS — Z13.1 SCREENING FOR DIABETES MELLITUS: ICD-10-CM

## 2019-04-29 PROCEDURE — 99213 OFFICE O/P EST LOW 20 MIN: CPT | Mod: 25 | Performed by: NURSE PRACTITIONER

## 2019-04-29 PROCEDURE — 99395 PREV VISIT EST AGE 18-39: CPT | Performed by: NURSE PRACTITIONER

## 2019-04-29 RX ORDER — SERTRALINE HYDROCHLORIDE 100 MG/1
100 TABLET, FILM COATED ORAL DAILY
Qty: 90 TABLET | Refills: 3 | Status: SHIPPED | OUTPATIENT
Start: 2019-04-29 | End: 2020-05-18

## 2019-04-29 ASSESSMENT — PAIN SCALES - GENERAL: PAINLEVEL: NO PAIN (0)

## 2019-04-29 ASSESSMENT — MIFFLIN-ST. JEOR: SCORE: 1527.35

## 2019-04-29 ASSESSMENT — PATIENT HEALTH QUESTIONNAIRE - PHQ9: SUM OF ALL RESPONSES TO PHQ QUESTIONS 1-9: 11

## 2019-04-29 NOTE — PATIENT INSTRUCTIONS
Preventive Health Recommendations  Female Ages 26 - 39  Yearly exam:   See your health care provider every year in order to    Review health changes.     Discuss preventive care.      Review your medicines if you your doctor has prescribed any.    Until age 30: Get a Pap test every three years (more often if you have had an abnormal result).    After age 30: Talk to your doctor about whether you should have a Pap test every 3 years or have a Pap test with HPV screening every 5 years.   You do not need a Pap test if your uterus was removed (hysterectomy) and you have not had cancer.  You should be tested each year for STDs (sexually transmitted diseases), if you're at risk.   Talk to your provider about how often to have your cholesterol checked.  If you are at risk for diabetes, you should have a diabetes test (fasting glucose).  Shots: Get a flu shot each year. Get a tetanus shot every 10 years.   Nutrition:     Eat at least 5 servings of fruits and vegetables each day.    Eat whole-grain bread, whole-wheat pasta and brown rice instead of white grains and rice.    Get adequate Calcium and Vitamin D.     Lifestyle    Exercise at least 150 minutes a week (30 minutes a day, 5 days of the week). This will help you control your weight and prevent disease.    Limit alcohol to one drink per day.    No smoking.     Wear sunscreen to prevent skin cancer.    See your dentist every six months for an exam and cleaning.        ================================================================================  Normal Values   Blood pressure  <140/90 for most adults    <130/80 for some chronic diseases (ask your care team about yours)    BMI (body mass index)  18.5-25 kg/m2 (based on height and weight)     Thank you for visiting [facility]    Normal or non-critical lab and imaging results will be communicated to you by MyChart, letter or phone within 7 days.  If you do not hear from us within 10 days, please  call the clinic. If you have a critical or abnormal lab result, we will notify you by phone as soon as possible.     If you have any questions regarding your visit please contact:     Team Comfort:   Clinic Hours Telephone Number   Dr. Alfonso Meadows 7am-5pm  Monday - Friday [phone]  Deep RN  Geeta RN  Elba RN   Pharmacy 8:00am-8pm Monday-Friday    9am-5pm Saturday-Sunday [phone]   Urgent Care 11am-9pm Monday-Friday        9am-5pm Saturday-Sunday [phone]     After hours, weekend or if you need to make an appointment with your primary provider please call [phone].   After Hours nurse advise: call [facility]: [phone]    Medication Refills:  Call your pharmacy and they will forward the refill to us. Please allow 3 business days for your refills to be completed.

## 2019-04-29 NOTE — PROGRESS NOTES
SUBJECTIVE:   CC: Bernice Garcia is an 33 year old woman who presents for preventive health visit.     Healthy Habits:    Do you get at least three servings of calcium containing foods daily (dairy, green leafy vegetables, etc.)? yes    Amount of exercise or daily activities, outside of work: 3-5 day(s) per week    Problems taking medications regularly No    Medication side effects: No    Have you had an eye exam in the past two years? yes    Do you see a dentist twice per year? yes    Do you have sleep apnea, excessive snoring or daytime drowsiness?yes          Anxiety Follow-Up    Status since last visit: Improved, some lingering anxiety symptoms.      Other associated symptoms:difficulty falling asleep    Complicating factors:   Significant life event: No   Current substance abuse: None  Depression symptoms: No  AMANDA-7 SCORE 3/27/2014 3/2/2018   Total Score 16 -   Total Score - 17   patient would like to see how going up on dose would be for her.      AMANDA-7    Today's PHQ-2 Score:   PHQ-2 ( 1999 Pf[employer]) 3/29/2019 10/6/2017   Q1: Little interest or pleasure in doing things 0 0   Q2: Feeling down, depressed or hopeless 0 0   PHQ-2 Score 0 0   Q1: Little interest or pleasure in doing things Not at all -   Q2: Feeling down, depressed or hopeless Not at all -   PHQ-2 Score 0 -       Abuse: Current or Past(Physical, Sexual or Emotional)- NO  Do you feel safe in your environment? YES    Social History     Tobacco Use     Smoking status: Former Smoker     Types: Cigarettes     Last attempt to quit: 1/1/2016     Years since quitting: 3.3     Smokeless tobacco: Never Used   Substance Use Topics     Alcohol use: Yes     Comment: not in PG     If you drink alcohol do you typically have >3 drinks per day or >7 drinks per week? No                     Reviewed orders with patient.  Reviewed health maintenance and updated orders accordingly - Yes  Labs reviewed in EPIC  BP Readings from Last 3 Encounters:   04/29/19  132/83   03/02/18 141/89   10/06/17 131/88    Wt Readings from Last 3 Encounters:   04/29/19 81.6 kg (180 lb)   03/02/18 88.5 kg (195 lb)   10/06/17 90.7 kg (200 lb)                  Patient Active Problem List   Diagnosis     Generalized anxiety disorder     Past Surgical History:   Procedure Laterality Date     NO HISTORY OF SURGERY         Social History     Tobacco Use     Smoking status: Former Smoker     Types: Cigarettes     Last attempt to quit: 1/1/2016     Years since quitting: 3.3     Smokeless tobacco: Never Used   Substance Use Topics     Alcohol use: Yes     Comment: not in PG     Family History   Problem Relation Age of Onset     Alzheimer Disease Maternal Grandmother      Diabetes Maternal Grandfather      Alzheimer Disease Maternal Grandfather      Depression Sister      Cancer Daughter      Glaucoma No family hx of      Macular Degeneration No family hx of          Current Outpatient Medications   Medication Sig Dispense Refill     hydrOXYzine (ATARAX) 10 MG tablet Take 1 tablet (10 mg) by mouth every 6 hours as needed for anxiety (or sleep) 60 tablet 1     sertraline (ZOLOFT) 100 MG tablet Take 1 tablet (100 mg) by mouth daily 90 tablet 3     sertraline (ZOLOFT) 50 MG tablet Take 0.5 tablets (25 mg) by mouth daily In addition to 100 mg tablet 45 tablet 3     No Known Allergies    Mammogram not appropriate for this patient based on age.    Pertinent mammograms are reviewed under the imaging tab.  History of abnormal Pap smear: NO - age 30-65 PAP every 5 years with negative HPV co-testing recommended  PAP / HPV Latest Ref Rng & Units 4/5/2017   PAP - NIL   HPV 16 DNA NEG Negative   HPV 18 DNA NEG Negative   OTHER HR HPV NEG Negative     Reviewed and updated as needed this visit by clinical staff  Tobacco  Allergies  Meds  Med Hx  Surg Hx  Fam Hx  Soc Hx        Reviewed and updated as needed this visit by Provider  Tobacco  Allergies  Meds  Med Hx  Surg Hx  Fam Hx  Soc Hx       Past  "Medical History:   Diagnosis Date     Depression with anxiety 3/28/2014      Past Surgical History:   Procedure Laterality Date     NO HISTORY OF SURGERY       OB History    Para Term  AB Living   1 1 1 0 0 1   SAB TAB Ectopic Multiple Live Births   0 0 0 0 1      # Outcome Date GA Lbr George/2nd Weight Sex Delivery Anes PTL Lv   1 Term 17 39w0d 06:00 / 00:37 3.941 kg (8 lb 11 oz) M  EPI  DAVID      Name: Shravan      Apgar1: 7  Apgar5: 8       ROS:  CONSTITUTIONAL: NEGATIVE for fever, chills, change in weight  INTEGUMENTARU/SKIN: NEGATIVE for worrisome rashes, moles or lesions  EYES: NEGATIVE for vision changes or irritation  ENT: NEGATIVE for ear, mouth and throat problems  RESP: NEGATIVE for significant cough or SOB  BREAST: NEGATIVE for masses, tenderness or discharge  CV: NEGATIVE for chest pain, palpitations or peripheral edema  GI: NEGATIVE for nausea, abdominal pain, heartburn, or change in bowel habits  : NEGATIVE for unusual urinary or vaginal symptoms. Periods are regular.  MUSCULOSKELETAL: NEGATIVE for significant arthralgias or myalgia  NEURO: NEGATIVE for weakness, dizziness or paresthesias  PSYCHIATRIC: see above.     OBJECTIVE:   /83 (BP Location: Left arm, Patient Position: Chair, Cuff Size: Adult Large)   Pulse 90   Temp 99  F (37.2  C) (Oral)   Ht 1.651 m (5' 5\")   Wt 81.6 kg (180 lb)   LMP 2019   SpO2 97%   BMI 29.95 kg/m    EXAM:  GENERAL: healthy, alert and no distress  EYES: Eyes grossly normal to inspection, PERRL and conjunctivae and sclerae normal  HENT: ear canals and TM's normal, nose and mouth without ulcers or lesions  NECK: no adenopathy, no asymmetry, masses, or scars and thyroid normal to palpation  RESP: lungs clear to auscultation - no rales, rhonchi or wheezes  CV: regular rate and rhythm, normal S1 S2, no S3 or S4, no murmur, click or rub, no peripheral edema and peripheral pulses strong  ABDOMEN: soft, nontender, no hepatosplenomegaly, no " "masses and bowel sounds normal  MS: no gross musculoskeletal defects noted, no edema  SKIN: no suspicious lesions or rashes  NEURO: Normal strength and tone, mentation intact and speech normal  PSYCH: mentation appears normal, affect normal/bright    Diagnostic Test Results:  none     ASSESSMENT/PLAN:   1. Encounter for routine adult health examination without abnormal findings  Patient and  not preventing pregnancy. Would be ok with being pregnant. Advised to start prenatal vitamin.     2. Generalized anxiety disorder  Doing well overall.  Will try going up to 125 mg.  To follow up if further adjustments needed.  Discussed data on sertraline in pregnancy.  Patient would like to stay on it when they become pregnancy.  Discussed hydroxyzine not recommended but can use up until pregnancy.  - sertraline (ZOLOFT) 100 MG tablet; Take 1 tablet (100 mg) by mouth daily  Dispense: 90 tablet; Refill: 3  - sertraline (ZOLOFT) 50 MG tablet; Take 0.5 tablets (25 mg) by mouth daily In addition to 100 mg tablet  Dispense: 45 tablet; Refill: 3    3. CARDIOVASCULAR SCREENING; LDL GOAL LESS THAN 130  - Lipid panel reflex to direct LDL Fasting; Future    4. Screening for diabetes mellitus  - **Glucose FUTURE anytime; Future    COUNSELING:   Reviewed preventive health counseling, as reflected in patient instructions       Regular exercise       Healthy diet/nutrition    BP Readings from Last 1 Encounters:   04/29/19 132/83     Estimated body mass index is 29.95 kg/m  as calculated from the following:    Height as of this encounter: 1.651 m (5' 5\").    Weight as of this encounter: 81.6 kg (180 lb).      Weight management plan: Discussed healthy diet and exercise guidelines     reports that she quit smoking about 3 years ago. Her smoking use included cigarettes. She has never used smokeless tobacco.      Counseling Resources:  ATP IV Guidelines  Pooled Cohorts Equation Calculator  Breast Cancer Risk Calculator  FRAX Risk " Assessment  ICSI Preventive Guidelines  Dietary Guidelines for Americans, 2010  USDA's MyPlate  ASA Prophylaxis  Lung CA Screening    Reena Mcclendon, KYLE MELENDEZ  [facility]

## 2019-05-04 DIAGNOSIS — Z13.1 SCREENING FOR DIABETES MELLITUS: ICD-10-CM

## 2019-05-04 DIAGNOSIS — Z13.6 CARDIOVASCULAR SCREENING; LDL GOAL LESS THAN 130: ICD-10-CM

## 2019-05-04 PROCEDURE — 36415 COLL VENOUS BLD VENIPUNCTURE: CPT | Performed by: NURSE PRACTITIONER

## 2019-05-04 PROCEDURE — 80061 LIPID PANEL: CPT | Performed by: NURSE PRACTITIONER

## 2019-05-04 PROCEDURE — 82947 ASSAY GLUCOSE BLOOD QUANT: CPT | Performed by: NURSE PRACTITIONER

## 2019-05-06 LAB
CHOLEST SERPL-MCNC: 233 MG/DL
GLUCOSE SERPL-MCNC: 86 MG/DL (ref 70–99)
HDLC SERPL-MCNC: 69 MG/DL
LDLC SERPL CALC-MCNC: 153 MG/DL
NONHDLC SERPL-MCNC: 164 MG/DL
TRIGL SERPL-MCNC: 57 MG/DL

## 2019-09-11 ENCOUNTER — ANCILLARY PROCEDURE (OUTPATIENT)
Dept: GENERAL RADIOLOGY | Facility: CLINIC | Age: 34
End: 2019-09-11
Attending: PHYSICIAN ASSISTANT
Payer: COMMERCIAL

## 2019-09-11 ENCOUNTER — OFFICE VISIT (OUTPATIENT)
Dept: FAMILY MEDICINE | Facility: CLINIC | Age: 34
End: 2019-09-11
Payer: COMMERCIAL

## 2019-09-11 VITALS
SYSTOLIC BLOOD PRESSURE: 136 MMHG | HEIGHT: 65 IN | HEART RATE: 81 BPM | DIASTOLIC BLOOD PRESSURE: 84 MMHG | OXYGEN SATURATION: 100 % | BODY MASS INDEX: 30.82 KG/M2 | WEIGHT: 185 LBS | RESPIRATION RATE: 16 BRPM | TEMPERATURE: 98.4 F

## 2019-09-11 DIAGNOSIS — R61 NIGHT SWEATS: ICD-10-CM

## 2019-09-11 DIAGNOSIS — R07.9 CHEST PAIN, UNSPECIFIED TYPE: ICD-10-CM

## 2019-09-11 DIAGNOSIS — R10.84 ABDOMINAL PAIN, GENERALIZED: Primary | ICD-10-CM

## 2019-09-11 LAB
ALBUMIN SERPL-MCNC: 4.3 G/DL (ref 3.4–5)
ALP SERPL-CCNC: 59 U/L (ref 40–150)
ALT SERPL W P-5'-P-CCNC: 21 U/L (ref 0–50)
ANION GAP SERPL CALCULATED.3IONS-SCNC: 6 MMOL/L (ref 3–14)
AST SERPL W P-5'-P-CCNC: 14 U/L (ref 0–45)
BASOPHILS # BLD AUTO: 0 10E9/L (ref 0–0.2)
BASOPHILS NFR BLD AUTO: 0.4 %
BILIRUB SERPL-MCNC: 0.5 MG/DL (ref 0.2–1.3)
BUN SERPL-MCNC: 15 MG/DL (ref 7–30)
CALCIUM SERPL-MCNC: 9.2 MG/DL (ref 8.5–10.1)
CHLORIDE SERPL-SCNC: 104 MMOL/L (ref 94–109)
CO2 SERPL-SCNC: 28 MMOL/L (ref 20–32)
CREAT SERPL-MCNC: 0.72 MG/DL (ref 0.52–1.04)
DIFFERENTIAL METHOD BLD: NORMAL
EOSINOPHIL # BLD AUTO: 0.1 10E9/L (ref 0–0.7)
EOSINOPHIL NFR BLD AUTO: 1.1 %
ERYTHROCYTE [DISTWIDTH] IN BLOOD BY AUTOMATED COUNT: 12.2 % (ref 10–15)
GFR SERPL CREATININE-BSD FRML MDRD: >90 ML/MIN/{1.73_M2}
GLUCOSE SERPL-MCNC: 92 MG/DL (ref 70–99)
HCG UR QL: NEGATIVE
HCT VFR BLD AUTO: 39.7 % (ref 35–47)
HGB BLD-MCNC: 13.7 G/DL (ref 11.7–15.7)
LIPASE SERPL-CCNC: 127 U/L (ref 73–393)
LYMPHOCYTES # BLD AUTO: 3 10E9/L (ref 0.8–5.3)
LYMPHOCYTES NFR BLD AUTO: 37 %
MCH RBC QN AUTO: 30.7 PG (ref 26.5–33)
MCHC RBC AUTO-ENTMCNC: 34.5 G/DL (ref 31.5–36.5)
MCV RBC AUTO: 89 FL (ref 78–100)
MONOCYTES # BLD AUTO: 0.5 10E9/L (ref 0–1.3)
MONOCYTES NFR BLD AUTO: 5.9 %
NEUTROPHILS # BLD AUTO: 4.5 10E9/L (ref 1.6–8.3)
NEUTROPHILS NFR BLD AUTO: 55.6 %
PLATELET # BLD AUTO: 233 10E9/L (ref 150–450)
POTASSIUM SERPL-SCNC: 3.5 MMOL/L (ref 3.4–5.3)
PROT SERPL-MCNC: 7.6 G/DL (ref 6.8–8.8)
RBC # BLD AUTO: 4.46 10E12/L (ref 3.8–5.2)
SODIUM SERPL-SCNC: 138 MMOL/L (ref 133–144)
TSH SERPL DL<=0.005 MIU/L-ACNC: 1.29 MU/L (ref 0.4–4)
WBC # BLD AUTO: 8.2 10E9/L (ref 4–11)

## 2019-09-11 PROCEDURE — 93000 ELECTROCARDIOGRAM COMPLETE: CPT | Performed by: PHYSICIAN ASSISTANT

## 2019-09-11 PROCEDURE — 85045 AUTOMATED RETICULOCYTE COUNT: CPT | Performed by: PHYSICIAN ASSISTANT

## 2019-09-11 PROCEDURE — 36415 COLL VENOUS BLD VENIPUNCTURE: CPT | Performed by: PHYSICIAN ASSISTANT

## 2019-09-11 PROCEDURE — 85025 COMPLETE CBC W/AUTO DIFF WBC: CPT | Performed by: PHYSICIAN ASSISTANT

## 2019-09-11 PROCEDURE — 99214 OFFICE O/P EST MOD 30 MIN: CPT | Performed by: PHYSICIAN ASSISTANT

## 2019-09-11 PROCEDURE — 71046 X-RAY EXAM CHEST 2 VIEWS: CPT

## 2019-09-11 PROCEDURE — 84443 ASSAY THYROID STIM HORMONE: CPT | Performed by: PHYSICIAN ASSISTANT

## 2019-09-11 PROCEDURE — 85060 BLOOD SMEAR INTERPRETATION: CPT | Performed by: PHYSICIAN ASSISTANT

## 2019-09-11 PROCEDURE — 83690 ASSAY OF LIPASE: CPT | Performed by: PHYSICIAN ASSISTANT

## 2019-09-11 PROCEDURE — 81025 URINE PREGNANCY TEST: CPT | Performed by: PHYSICIAN ASSISTANT

## 2019-09-11 PROCEDURE — 80053 COMPREHEN METABOLIC PANEL: CPT | Performed by: PHYSICIAN ASSISTANT

## 2019-09-11 RX ORDER — FAMOTIDINE 40 MG/1
40 TABLET, FILM COATED ORAL DAILY
Qty: 30 TABLET | Refills: 2 | Status: SHIPPED | OUTPATIENT
Start: 2019-09-11 | End: 2019-10-11

## 2019-09-11 ASSESSMENT — PAIN SCALES - GENERAL: PAINLEVEL: NO PAIN (0)

## 2019-09-11 ASSESSMENT — MIFFLIN-ST. JEOR: SCORE: 1545.03

## 2019-09-11 NOTE — PATIENT INSTRUCTIONS
At [facility], we strive to deliver an exceptional experience to you, every time we see you.  If you receive a survey in the mail, please send us back your thoughts. We really do value your feedback.    Based on your medical history, these are the current health maintenance/preventive care services that you are due for (some may have been done at this visit.)  Health Maintenance Due   Topic Date Due     INFLUENZA VACCINE (1) 09/01/2019         Suggested websites for health information:  Www.[employer].org : Up to date and easily searchable information on multiple topics.  Www.medlineplus.gov : medication info, interactive tutorials, watch real surgeries online  Www.familydoctor.org : good info from the Academy of Family Physicians  Www.cdc.gov : public health info, travel advisories, epidemics (H1N1)  Www.aap.org : children's health info, normal development, vaccinations  Www.health.[facility].[place].us : [place] dept of health, public health issues in [place], N1N1    Your care team:                            Family Medicine Internal Medicine   MD Jm Johnson MD Shantel Branch-Fleming, MD Katya Georgiev PA-C Nam Ho, MD Pediatrics   CONSTANZA Graham, CNP Tiffanie Kennedy APRN CNP   MD Ria Boggs MD Deborah Mielke, MD Kim Thein, APRN CNP      Clinic hours: Monday - Thursday 7 am-7 pm; Fridays 7 am-5 pm.   Urgent care: Monday - Friday 11 am-9 pm; Saturday and Sunday 9 am-5 pm.  Pharmacy : Monday -Thursday 8 am-8 pm; Friday 8 am-6 pm; Saturday and Sunday 9 am-5 pm.     Clinic: [phone]   Pharmacy: [phone]    Patient Education     GERD (Adult)    The esophagus is a tube that carries food from the mouth to the stomach. A valve (the LES, lower esophageal sphincter) at the lower end of the esophagus prevents stomach acid from flowing upward. When this valve doesn't work properly, stomach contents may repeatedly flow back up (reflux) into  "the esophagus. This is called gastroesophageal reflux disease (GERD). GERD can irritate the esophagus. It can cause problems with pain, swallowing or breathing. In severe cases, GERD can cause recurrent pneumonia (from aspiration or breathing in particles) or other serious problems.  Symptoms of reflux include burning, pressure or sharp pain in the upper abdomen or mid to lower chest. The pain can spread to the neck, back, or shoulder. There may be belching, an acid taste in the back of the throat, chronic cough, or sore throat, or hoarseness. GERD symptoms often occur during the day after a big meal. They can also occur at night when lying down.   Home care  Lifestyle changes can help reduce symptoms. If needed, your healthcare provider may prescribe medicines. Symptoms often improve with treatment, but if treatment is stopped, the symptoms often return after a few months. So most persons with GERD will need to continue treatment or get treatment on and off.  Lifestyle changes    Limit or avoid fatty, fried, and spicy foods, as well as coffee, chocolate, mint, and foods with high acid content such as tomatoes and citrus fruit and juices (orange, grapefruit, lemon).    Don t eat large meals, especially at night. Frequent, smaller meals are best. Don't lie down right after eating. And don t eat anything 3 hours before going to bed.    Don't drink alcohol or smoke. As much as possible, stay away from second hand smoke.    If you are overweight, losing weight will reduce symptoms.     Don't wear tight clothing around your stomach area.    If your symptoms occur during sleep, use a foam wedge to elevate your upper body (not just your head.) Or, place 4\" blocks under the head of your bed. Or use 2 bed risers under your bedframe.  Medicines  If needed, medicines can help relieve the symptoms of GERD and prevent damage to the esophagus. Discuss a medicine plan with your healthcare provider. This may include one or more of " the following medicines:    Antacids to help neutralize the normal acids in your stomach.    Acid blockers (Histamine or H2 blockers) to decrease acid production.    Acid inhibitors (proton pump inhibitors PPIs) to decrease acid production in a different way than the blockers. They may work better, but can take a little longer to take effect.  Take an antacid 30 to 60 minutes after eating and at bedtime, but not at the same time as an acid blocker.Try not to take medicines such as ibuprofen and aspirin. If you are taking aspirin for your heart or other medical reasons, talk to your healthcare provider about stopping it.  Follow-up care  Follow up with your healthcare provider or as advised by our staff.  When to seek medical advice  Call your healthcare provider if any of the following occur:    Stomach pain gets worse or moves to the lower right abdomen (appendix area)    Chest pain appears or gets worse, or spreads to the back, neck, shoulder, or arm    An over-the-counter trial of medicine doesn't relieve your symptoms    Weight loss that can't be explained    Trouble or pain swallowing    Frequent vomiting (can t keep down liquids)    Blood in the stool or vomit (red or black in color)    Feeling weak or dizzy    Fever of 100.4 F (38 C) or higher, or as directed by your healthcare provider  Date Last Reviewed: 3/1/2018    6864-7560 [employer]. [address] [place] [address], [place] [address]. All rights reserved. This information is not intended as a substitute for professional medical care. Always follow your healthcare professional's instructions.           Patient Education     Lifestyle Changes for Controlling GERD  When you have GERD, stomach acid feels as if it s backing up toward your mouth. Whether or not you take medicine to control your GERD, your symptoms can often be improved with lifestyle changes. Talk to your healthcare provider about the following suggestions. These suggestions may help  you get relief from your symptoms.      Raise your head  Reflux is more likely to strike when you re lying down flat, because stomach fluid can flow backward more easily. Raising the head of your bed 4 to 6 inches can help. To do this:    Slide blocks or books under the legs at the head of your bed. Or, place a wedge under the mattress. Many foam stores can make a suitable wedge for you. The wedge should run from your waist to the top of your head.    Don t just prop your head on several pillows. This increases pressure on your stomach. It can make GERD worse.  Watch your eating habits  Certain foods may increase the acid in your stomach or relax the lower esophageal sphincter. This makes GERD more likely. It s best to avoid the following if they cause you symptoms:    Coffee, tea, and carbonated drinks (with and without caffeine)    Fatty, fried, or spicy food    Mint, chocolate, onions, and tomatoes    Peppermint    Any other foods that seem to irritate your stomach or cause you pain  Relieve the pressure  Tips include the following:    Eat smaller meals, even if you have to eat more often.    Don t lie down right after you eat. Wait a few hours for your stomach to empty.    Avoid tight belts and tight-fitting clothes.    Lose excess weight.  Tobacco and alcohol  Avoid smoking tobacco and drinking alcohol. They can make GERD symptoms worse.  Date Last Reviewed: 7/1/2016 2000-2018 [employer]. [address] [place] [address], [place] [address]. All rights reserved. This information is not intended as a substitute for professional medical care. Always follow your healthcare professional's instructions.

## 2019-09-11 NOTE — PROGRESS NOTES
Subjective     Bernice Garcia is a 34 year old female who presents to clinic today for the following health issues:    HPI   ABDOMINAL   PAIN     Onset: 2-3 weeks ago    Description:   Character: Sharp  Location: all over  Radiation: None    Intensity: severe    Progression of Symptoms:  intermittent    Accompanying Signs & Symptoms:  Fever/Chills?: no   Gas/Bloating: YES  Nausea: no   Vomitting: no   Diarrhea?: no   Constipation:no   Dysuria or Hematuria: no    History:   Trauma: no   Previous similar pain: no    Previous tests done: none    Precipitating factors:   Does the pain change with:     Food: no      BM: no     Urination: no     Alleviating factors:  none    Therapies Tried and outcome: Ibuprofen: not sure of outcome    LMP:  08/28/2019     CHEST PAIN     Onset: 1-2 weeks ago    Description:   Location:  entire chest  Character: sharp  Radiation: none  Duration: 2 hours     Intensity: severe    Progression of Symptoms:  intermittent    Accompanying Signs & Symptoms:  Shortness of breath: YES  Sweating: no   Nausea/vomiting: no  Lightheadedness: no  Palpitations: YES  Fever/Chills: no   Cough: YES, at night  Heartburn: no    History:   Family history of heart disease no   Tobacco use: YES- former smoker    Precipitating factors:   Worse with exertion: no   Worse with deep breaths :  no   Related to food: no     Alleviating factors:  none       Therapies Tried and outcome: Tums and Ibuprofen: mild relief    No pain now, it comes and goes  Hx GERD with pregnancy  No fam hx heart issues, no hx sx, no PE risk factores            Has beenhaving nightsweats pretty much daily sinc eher last pregnancy- 2 years ago      No Known Allergies    Patient Active Problem List   Diagnosis     Generalized anxiety disorder       Past Medical History:   Diagnosis Date     Depression with anxiety 3/28/2014         Current Outpatient Medications on File Prior to Visit:  sertraline (ZOLOFT) 100 MG tablet Take 1 tablet  "(100 mg) by mouth daily   sertraline (ZOLOFT) 50 MG tablet Take 0.5 tablets (25 mg) by mouth daily In addition to 100 mg tablet   hydrOXYzine (ATARAX) 10 MG tablet Take 1 tablet (10 mg) by mouth every 6 hours as needed for anxiety (or sleep)     No current facility-administered medications on file prior to visit.     Social History     Tobacco Use     Smoking status: Former Smoker     Types: Cigarettes     Last attempt to quit: 1/1/2016     Years since quitting: 3.6     Smokeless tobacco: Never Used   Substance Use Topics     Alcohol use: Yes     Comment: not in PG     Drug use: No       Family History   Problem Relation Age of Onset     Alzheimer Disease Maternal Grandmother      Diabetes Maternal Grandfather      Alzheimer Disease Maternal Grandfather      Depression Sister      Cancer Daughter      Glaucoma No family hx of      Macular Degeneration No family hx of          ROS:  General: negative for fever  Resp: negative for dyspane   CV: + for chest pain  GI: as above  : negative for dysuria       OBJECTIVE:  /84 (BP Location: Right arm, Patient Position: Chair, Cuff Size: Adult Large)   Pulse 81   Temp 98.4  F (36.9  C) (Oral)   Resp 16   Ht 1.651 m (5' 5\")   Wt 83.9 kg (185 lb)   LMP 08/28/2019 (Approximate)   SpO2 100%   Breastfeeding? No   BMI 30.79 kg/m     General:   awake, alert, and cooperative.  NAD.   Head: Normocephalic, atraumatic.  Eyes: Conjunctiva clear, non icteric.   Heart: Regular rate and rhythm. No murmur.  Lungs: Chest is clear; no wheezes or rales.  ABD: soft, no tenderness to palpation , no rigidity, guarding or rebound , bowel sounds intact  Neuro: Alert and oriented - normal speech.       UPREG NEGATIVE  CBC WNL   EKG NSR      ASSESSMENT:well appearing, likley GI etiology, no fever, TTP, or elev WBC- will start with GERD tx. PE r/o by PERC.      ICD-10-CM    1. Abdominal pain, generalized R10.84 CBC with platelets differential     Comprehensive metabolic panel     " Lipase     JUST IN CASE     HCG qualitative urine     Helicobacter pylori Antigen Stool     famotidine (PEPCID) 40 MG tablet     TSH with free T4 reflex   2. Chest pain, unspecified type R07.9 EKG 12-lead complete w/read - Clinics     XR Chest 2 Views   3. Night sweats R61 Blood Morphology Pathologist Review     Reticulocyte Count     TSH with free T4 reflex           PLAN:        Advised about symptoms which might herald more serious problems.

## 2019-09-12 LAB
RETICS # AUTO: 91.2 10E9/L (ref 25–95)
RETICS/RBC NFR AUTO: 2 % (ref 0.5–2)

## 2019-09-12 PROCEDURE — 87338 HPYLORI STOOL AG IA: CPT | Performed by: PHYSICIAN ASSISTANT

## 2019-09-16 DIAGNOSIS — R10.84 ABDOMINAL PAIN, GENERALIZED: ICD-10-CM

## 2019-09-16 LAB — COPATH REPORT: NORMAL

## 2019-09-17 ENCOUNTER — TELEPHONE (OUTPATIENT)
Dept: FAMILY MEDICINE | Facility: CLINIC | Age: 34
End: 2019-09-17

## 2019-09-17 LAB — H PYLORI AG STL QL IA: NEGATIVE

## 2019-10-28 DIAGNOSIS — Z32.00 PREGNANCY EXAMINATION OR TEST, PREGNANCY UNCONFIRMED: Primary | ICD-10-CM

## 2019-10-29 ENCOUNTER — OFFICE VISIT (OUTPATIENT)
Dept: OBGYN | Facility: CLINIC | Age: 34
End: 2019-10-29
Payer: COMMERCIAL

## 2019-10-29 VITALS
DIASTOLIC BLOOD PRESSURE: 81 MMHG | OXYGEN SATURATION: 100 % | SYSTOLIC BLOOD PRESSURE: 132 MMHG | HEART RATE: 84 BPM | BODY MASS INDEX: 31.28 KG/M2 | WEIGHT: 188 LBS

## 2019-10-29 DIAGNOSIS — Z23 NEED FOR TDAP VACCINATION: ICD-10-CM

## 2019-10-29 DIAGNOSIS — Z32.00 PREGNANCY EXAMINATION OR TEST, PREGNANCY UNCONFIRMED: Primary | ICD-10-CM

## 2019-10-29 DIAGNOSIS — Z34.91 NORMAL PREGNANCY IN FIRST TRIMESTER: Primary | ICD-10-CM

## 2019-10-29 LAB — B-HCG SERPL-ACNC: 730 IU/L (ref 0–5)

## 2019-10-29 PROCEDURE — 84702 CHORIONIC GONADOTROPIN TEST: CPT | Performed by: OBSTETRICS & GYNECOLOGY

## 2019-10-29 PROCEDURE — 99213 OFFICE O/P EST LOW 20 MIN: CPT | Performed by: OBSTETRICS & GYNECOLOGY

## 2019-10-29 PROCEDURE — 36415 COLL VENOUS BLD VENIPUNCTURE: CPT | Performed by: OBSTETRICS & GYNECOLOGY

## 2019-10-29 NOTE — PATIENT INSTRUCTIONS
If you have any questions regarding your visit, Please contact your care team.    Women s Health CLINIC HOURS TELEPHONE NUMBER   Diana Bishop M.D.    [place] Marie           Monday-[facility]  7:00 am - 5 pm Monday's Tuesday- [facility]  8:00am- 5 pm  Wednesday- Off  Thursday- Off Surgery  Friday-Am [place], and [facility]  [place] 8:00-11:30 AM  [place] 1:00-5:00 PM [facility]  [address] [place] [address]  [phone] ask for [facility]    Imaging [phone]    [facility]  [address] [place], [place] [id number] [phone]  Imaging [phone]    [facility]  [address] [place], [place] [id number] [phone]  Imaging Scheduling - [phone]     Urgent Care locations:    [facility] Saturday and Sunday   9 am - 5 pm    Monday-Friday   12 pm - 8 pm  Saturday and Sunday   9 am - 5 pm   [phone]       If you need a medication refill, please contact your pharmacy. Please allow 3 business days for your refill to be completed.  As always, Thank you for trusting us with your healthcare needs!

## 2019-10-29 NOTE — PROGRESS NOTES
CC: Absence of menses/amenorrhea  HPI: Patient is here today because she has not had a menses since 20.  She took 10 home pregnancy tests and they were positive.  Patient is on a prenatal vitamins.  All questions answered.  .  1 .  4w6d    ROS: 10 point ROS neg other than the symptoms noted above in the HPI.    /81   Pulse 84   Wt 85.3 kg (188 lb)   LMP 2019   SpO2 100%   BMI 31.28 kg/m      Assessment/Plan:  1.)  Amenorrhea=quant HcG  2.)  Schedule initial ob visit  3.)  Anxiety=stable    20 minutes was spent face to face with the patient today discussing her history, diagnosis, and follow-up plan as noted above.  Over 50% of the visit was spent in counseling and coordination of care.    Total Visit Time: 20 minutes      Diana Bishop DO

## 2019-11-12 ENCOUNTER — ANCILLARY PROCEDURE (OUTPATIENT)
Dept: ULTRASOUND IMAGING | Facility: CLINIC | Age: 34
End: 2019-11-12
Attending: OBSTETRICS & GYNECOLOGY
Payer: COMMERCIAL

## 2019-11-12 DIAGNOSIS — Z34.91 NORMAL PREGNANCY IN FIRST TRIMESTER: ICD-10-CM

## 2019-11-12 PROCEDURE — 76817 TRANSVAGINAL US OBSTETRIC: CPT

## 2019-11-12 PROCEDURE — 76801 OB US < 14 WKS SINGLE FETUS: CPT | Mod: 59

## 2019-11-12 NOTE — RESULT ENCOUNTER NOTE
US with IUP, 6w 5d.  EROS 7/2/20.  Plan to confirm due date at her new OB appointment.     MA - it looks like she is scheduled for 12/5, but I think it should be scheduled for a NEW OB apt.  Please adjust appointment if needed or discuss with Dr. Bishop.

## 2019-11-29 ENCOUNTER — PRENATAL OFFICE VISIT (OUTPATIENT)
Dept: OBGYN | Facility: CLINIC | Age: 34
End: 2019-11-29
Payer: COMMERCIAL

## 2019-11-29 VITALS
DIASTOLIC BLOOD PRESSURE: 82 MMHG | BODY MASS INDEX: 30.91 KG/M2 | HEIGHT: 65 IN | WEIGHT: 185.5 LBS | HEART RATE: 88 BPM | SYSTOLIC BLOOD PRESSURE: 120 MMHG

## 2019-11-29 DIAGNOSIS — F41.1 GENERALIZED ANXIETY DISORDER: ICD-10-CM

## 2019-11-29 DIAGNOSIS — Z34.91 NORMAL PREGNANCY IN FIRST TRIMESTER: Primary | ICD-10-CM

## 2019-11-29 LAB
ABO + RH BLD: NORMAL
ABO + RH BLD: NORMAL
BASOPHILS # BLD AUTO: 0 10E9/L (ref 0–0.2)
BASOPHILS NFR BLD AUTO: 0.3 %
BLD GP AB SCN SERPL QL: NORMAL
BLOOD BANK CMNT PATIENT-IMP: NORMAL
DIFFERENTIAL METHOD BLD: NORMAL
EOSINOPHIL # BLD AUTO: 0.1 10E9/L (ref 0–0.7)
EOSINOPHIL NFR BLD AUTO: 0.9 %
ERYTHROCYTE [DISTWIDTH] IN BLOOD BY AUTOMATED COUNT: 12.1 % (ref 10–15)
HBV SURFACE AG SERPL QL IA: NONREACTIVE
HCT VFR BLD AUTO: 38.7 % (ref 35–47)
HGB BLD-MCNC: 13.5 G/DL (ref 11.7–15.7)
HIV 1+2 AB+HIV1 P24 AG SERPL QL IA: NONREACTIVE
LYMPHOCYTES # BLD AUTO: 2.5 10E9/L (ref 0.8–5.3)
LYMPHOCYTES NFR BLD AUTO: 31.6 %
MCH RBC QN AUTO: 30 PG (ref 26.5–33)
MCHC RBC AUTO-ENTMCNC: 34.9 G/DL (ref 31.5–36.5)
MCV RBC AUTO: 86 FL (ref 78–100)
MONOCYTES # BLD AUTO: 0.7 10E9/L (ref 0–1.3)
MONOCYTES NFR BLD AUTO: 8.5 %
NEUTROPHILS # BLD AUTO: 4.6 10E9/L (ref 1.6–8.3)
NEUTROPHILS NFR BLD AUTO: 58.7 %
PLATELET # BLD AUTO: 213 10E9/L (ref 150–450)
RBC # BLD AUTO: 4.5 10E12/L (ref 3.8–5.2)
RUBV IGG SERPL IA-ACNC: 23 IU/ML
SPECIMEN EXP DATE BLD: NORMAL
T PALLIDUM AB SER QL: NONREACTIVE
WBC # BLD AUTO: 7.8 10E9/L (ref 4–11)

## 2019-11-29 PROCEDURE — 87340 HEPATITIS B SURFACE AG IA: CPT | Performed by: OBSTETRICS & GYNECOLOGY

## 2019-11-29 PROCEDURE — 87491 CHLMYD TRACH DNA AMP PROBE: CPT | Performed by: OBSTETRICS & GYNECOLOGY

## 2019-11-29 PROCEDURE — 87591 N.GONORRHOEAE DNA AMP PROB: CPT | Performed by: OBSTETRICS & GYNECOLOGY

## 2019-11-29 PROCEDURE — 36415 COLL VENOUS BLD VENIPUNCTURE: CPT | Performed by: OBSTETRICS & GYNECOLOGY

## 2019-11-29 PROCEDURE — 99207 ZZC FIRST OB VISIT: CPT | Performed by: OBSTETRICS & GYNECOLOGY

## 2019-11-29 PROCEDURE — 85025 COMPLETE CBC W/AUTO DIFF WBC: CPT | Performed by: OBSTETRICS & GYNECOLOGY

## 2019-11-29 PROCEDURE — 87086 URINE CULTURE/COLONY COUNT: CPT | Performed by: OBSTETRICS & GYNECOLOGY

## 2019-11-29 PROCEDURE — 87389 HIV-1 AG W/HIV-1&-2 AB AG IA: CPT | Performed by: OBSTETRICS & GYNECOLOGY

## 2019-11-29 PROCEDURE — 86762 RUBELLA ANTIBODY: CPT | Performed by: OBSTETRICS & GYNECOLOGY

## 2019-11-29 PROCEDURE — 86780 TREPONEMA PALLIDUM: CPT | Performed by: OBSTETRICS & GYNECOLOGY

## 2019-11-29 PROCEDURE — 86850 RBC ANTIBODY SCREEN: CPT | Performed by: OBSTETRICS & GYNECOLOGY

## 2019-11-29 PROCEDURE — 86900 BLOOD TYPING SEROLOGIC ABO: CPT | Performed by: OBSTETRICS & GYNECOLOGY

## 2019-11-29 PROCEDURE — 86901 BLOOD TYPING SEROLOGIC RH(D): CPT | Performed by: OBSTETRICS & GYNECOLOGY

## 2019-11-29 ASSESSMENT — MIFFLIN-ST. JEOR: SCORE: 1552.91

## 2019-11-29 NOTE — PROGRESS NOTES
HPI:    Bernice is a 34 year old yo  at 9 1/7 weeks by LMP and early ultrasound who presents today for her initial OB visit.  Last pap smear was in 2017 and was normal.    Issues: Nausea    Past OB Hx: 1      Father of baby: No health issues       Past Medical History:   Diagnosis Date     Depression with anxiety 3/28/2014             Past Surgical History:   Procedure Laterality Date     NO HISTORY OF SURGERY          Family History   Problem Relation Age of Onset     Alzheimer Disease Maternal Grandmother      Diabetes Maternal Grandfather      Alzheimer Disease Maternal Grandfather      Depression Sister      Cancer Daughter      Glaucoma No family hx of      Macular Degeneration No family hx of         Social History     Socioeconomic History     Marital status:      Spouse name: Chris     Number of children: 1     Years of education: Not on file     Highest education level: Not on file   Occupational History     Occupation:      Comment: [employer]   Social Needs     Financial resource strain: Not on file     Food insecurity:     Worry: Not on file     Inability: Not on file     Transportation needs:     Medical: Not on file     Non-medical: Not on file   Tobacco Use     Smoking status: Former Smoker     Types: Cigarettes     Last attempt to quit: 2016     Years since quitting: 3.9     Smokeless tobacco: Never Used   Substance and Sexual Activity     Alcohol use: Yes     Comment: not in PG     Drug use: No     Sexual activity: Yes     Partners: Male   Lifestyle     Physical activity:     Days per week: Not on file     Minutes per session: Not on file     Stress: Not on file   Relationships     Social connections:     Talks on phone: Not on file     Gets together: Not on file     Attends [employer] service: Not on file     Active member of club or organization: Not on file     Attends meetings of clubs or organizations: Not on file     Relationship status: Not on file     Intimate partner  violence:     Fear of current or ex partner: Not on file     Emotionally abused: Not on file     Physically abused: Not on file     Forced sexual activity: Not on file   Other Topics Concern     Parent/sibling w/ CABG, MI or angioplasty before 65F 55M? Not Asked   Social History Narrative     Not on file         Current Outpatient Medications:      Acetaminophen (TYLENOL) 325 MG CAPS, Take 325-650 mg by mouth every 4 hours as needed, Disp: , Rfl:      Prenatal Multivit-Min-Fe-FA (PRENATAL VITAMINS PO), , Disp: , Rfl:      sertraline (ZOLOFT) 100 MG tablet, Take 1 tablet (100 mg) by mouth daily, Disp: 90 tablet, Rfl: 3     sertraline (ZOLOFT) 50 MG tablet, Take 0.5 tablets (25 mg) by mouth daily In addition to 100 mg tablet, Disp: 45 tablet, Rfl: 3     hydrOXYzine (ATARAX) 10 MG tablet, Take 1 tablet (10 mg) by mouth every 6 hours as needed for anxiety (or sleep) (Patient not taking: Reported on 10/29/2019), Disp: 60 tablet, Rfl: 1      Objective:  Physical Exam:   Breast:  Benign exam, no masses palpated.  No skin changes, no axillary lymphadenopathy, no nipple discharge.  Axilla feel completely normal, no lymph node enlargement and non-tender.  Heart=RRR, no murmurs  Thyroid=normal, no masses, no TTP  Lungs=Clear to ascultation bilateral, non-labored breathing  Abd=soft, Nontender/nondistended, +bowel sounds x4  PELVIC:    External genitalia: normal without lesion  Vagina: normal mucosa and rugae, no discharge.  Cervix: normal without lesion, healthy, multiparous, GC and Chlamydia obtained  Uterus: small, mobile, nontender.  Adnexa: non tender, without masses  Rectal: deffered  Ext=no clubbing or cyanosis  FHTs=present on BSUS    Assessment:   1.  IUP at 9 1/7 weeks here for her initial OB visit    Plan:    1.  PNV  2.  NOB Labs   3.  Discussed routine prenatal care, quad screen, GCT, anatomy scan at ~19 weeks, frequency of visits.  4.  Discussed first trimester screen and she declines  5.  Delivery hospital:  [facility]  6.  RTC 4 weeks.  7.  Anxiety=stable      Discussed physician coverage, tertiary support, diet, exercise, weight gain, schedule of visits, routine and indicated ultrasounds, and childbirth education.    Options for  testing for chromosomal and birth defects were discussed with the patient. Diagnostic tests include CVS and Amniocentesis. We discussed that these tests are definitive but invasive and do carry a risk of fetal loss.   Screening tests include nuchal translucency/blood marker testing in the first trimester and quad screening in the second trimester. We discussed that these are screening tests and not diagnostic tests and that false positives and negatives are a distinct possibility.     25 minutes was spent face to face with the patient today discussing her history, diagnosis, and follow-up plan as noted above.  Over 50% of the visit was spent in counseling and coordination of care.    Total Visit Time: 30 minutes      Diana Bishop DO

## 2019-11-29 NOTE — PATIENT INSTRUCTIONS
If you have any questions regarding your visit, Please contact your care team.    Women s Health CLINIC HOURS TELEPHONE NUMBER   Diana Bishop M.D.    [place]           Monday-[facility]  7:00 am - 5 pm Monday's Tuesday- [facility]  8:00am- 5 pm  Wednesday- Off  Thursday- Off Surgery  Friday-Am [place], and [facility]  [place] 8:00-11:30 AM  [place] 1:00-5:00 PM [facility]  [address] [place] [address]  [phone] ask for [facility]    Imaging [phone]    [facility]  [address] [place], [place] [id number] [phone]  Imaging [phone]    [facility]  [address] [place], [place] [id number] [phone]  Imaging Scheduling - [phone]     Urgent Care locations:    [facility] Saturday and Sunday   9 am - 5 pm    Monday-Friday   12 pm - 8 pm  Saturday and Sunday   9 am - 5 pm   [phone]       If you need a medication refill, please contact your pharmacy. Please allow 3 business days for your refill to be completed.  As always, Thank you for trusting us with your healthcare needs!

## 2019-11-30 LAB
BACTERIA SPEC CULT: NO GROWTH
Lab: NORMAL
SPECIMEN SOURCE: NORMAL

## 2019-12-01 LAB
C TRACH DNA SPEC QL NAA+PROBE: NEGATIVE
N GONORRHOEA DNA SPEC QL NAA+PROBE: NEGATIVE
SPECIMEN SOURCE: NORMAL
SPECIMEN SOURCE: NORMAL

## 2019-12-02 ENCOUNTER — MYC MEDICAL ADVICE (OUTPATIENT)
Dept: OBGYN | Facility: CLINIC | Age: 34
End: 2019-12-02

## 2019-12-31 ENCOUNTER — PRENATAL OFFICE VISIT (OUTPATIENT)
Dept: OBGYN | Facility: CLINIC | Age: 34
End: 2019-12-31
Payer: COMMERCIAL

## 2019-12-31 VITALS
SYSTOLIC BLOOD PRESSURE: 109 MMHG | BODY MASS INDEX: 31.13 KG/M2 | DIASTOLIC BLOOD PRESSURE: 75 MMHG | WEIGHT: 189.1 LBS | HEART RATE: 90 BPM | OXYGEN SATURATION: 97 %

## 2019-12-31 DIAGNOSIS — F41.1 GENERALIZED ANXIETY DISORDER: ICD-10-CM

## 2019-12-31 DIAGNOSIS — Z34.92 NORMAL PREGNANCY IN SECOND TRIMESTER: Primary | ICD-10-CM

## 2019-12-31 PROCEDURE — 99207 ZZC PRENATAL VISIT: CPT | Performed by: OBSTETRICS & GYNECOLOGY

## 2019-12-31 NOTE — PATIENT INSTRUCTIONS
If you have any questions regarding your visit, Please contact your care team.    Women s Health CLINIC HOURS TELEPHONE NUMBER   Diana Bishop M.D.    [place]           Monday-[facility]  7:00 am - 5 pm Monday's Tuesday- [facility]  8:00am- 5 pm  Wednesday- Off  Thursday- Off Surgery  Friday-Am [place], and [facility]  [place] 8:00-11:30 AM  [place] 1:00-5:00 PM [facility]  [address] [place] [address]  [phone] ask for [facility]    Imaging [phone]    [facility]  [address] [place], [place] [id number] [phone]  Imaging [phone]    [facility]  [address] [place], [place] [id number] [phone]  Imaging Scheduling - [phone]     Urgent Care locations:    [facility] Saturday and Sunday   9 am - 5 pm    Monday-Friday   12 pm - 8 pm  Saturday and Sunday   9 am - 5 pm   [phone]       If you need a medication refill, please contact your pharmacy. Please allow 3 business days for your refill to be completed.  As always, Thank you for trusting us with your healthcare needs!

## 2019-12-31 NOTE — PROGRESS NOTES
34 year old  at 13w5d weeks presents to the clinic for a routine prenatal visit.  Feeling well.  No vaginal bleeding, leakage of fluid, or contractions   Fundal height=s=d  SGPb=245's by BSUS  Discussed quad screen and she declines  Will schedule anatomy ultrasound.  Anxiety=stable  RTC 4 weeks.    Diana Bishop, DO

## 2020-01-28 ENCOUNTER — PRENATAL OFFICE VISIT (OUTPATIENT)
Dept: OBGYN | Facility: CLINIC | Age: 35
End: 2020-01-28
Payer: COMMERCIAL

## 2020-01-28 VITALS
DIASTOLIC BLOOD PRESSURE: 70 MMHG | SYSTOLIC BLOOD PRESSURE: 112 MMHG | HEART RATE: 88 BPM | OXYGEN SATURATION: 96 % | BODY MASS INDEX: 31.31 KG/M2 | WEIGHT: 190.2 LBS

## 2020-01-28 DIAGNOSIS — F41.1 GENERALIZED ANXIETY DISORDER: ICD-10-CM

## 2020-01-28 DIAGNOSIS — Z34.92 NORMAL PREGNANCY IN SECOND TRIMESTER: Primary | ICD-10-CM

## 2020-01-28 PROCEDURE — 99207 ZZC PRENATAL VISIT: CPT | Performed by: OBSTETRICS & GYNECOLOGY

## 2020-01-28 NOTE — PROGRESS NOTES
34 year old  at 17w5d weeks presents to the clinic for a routine prenatal visit.  Feeling well.  No vaginal bleeding, leakage of fluid, or contractions   Fundal height=s=d  XFTc=794 and BSUS done  Discussed quad screen and she declines  Anxiety/depression=stable  Anatomy ultrasound already scheduled=  RTC 4 weeks.    Diana Bishop, DO

## 2020-01-28 NOTE — PATIENT INSTRUCTIONS
If you have any questions regarding your visit, Please contact your care team.    Women s Health CLINIC HOURS TELEPHONE NUMBER   Diana Bishop M.D.    [place]           Monday-[facility]  7:00 am - 5 pm Monday's Tuesday- [facility]  8:00am- 5 pm  Wednesday- Off  Thursday- Off Surgery  Friday-Am [place], and [facility]  [place] 8:00-11:30 AM  [place] 1:00-5:00 PM [facility]  [address] [place] [address]  [phone] ask for [facility]    Imaging [phone]    [facility]  [address] [place], [place] [id number] [phone]  Imaging [phone]    [facility]  [address] [place], [place] [id number] [phone]  Imaging Scheduling - [phone]     Urgent Care locations:    [facility] Saturday and Sunday   9 am - 5 pm    Monday-Friday   12 pm - 8 pm  Saturday and Sunday   9 am - 5 pm   [phone]       If you need a medication refill, please contact your pharmacy. Please allow 3 business days for your refill to be completed.  As always, Thank you for trusting us with your healthcare needs!

## 2020-02-03 ENCOUNTER — MYC MEDICAL ADVICE (OUTPATIENT)
Dept: OBGYN | Facility: CLINIC | Age: 35
End: 2020-02-03

## 2020-02-14 ENCOUNTER — ANCILLARY PROCEDURE (OUTPATIENT)
Dept: ULTRASOUND IMAGING | Facility: CLINIC | Age: 35
End: 2020-02-14
Attending: OBSTETRICS & GYNECOLOGY
Payer: COMMERCIAL

## 2020-02-14 DIAGNOSIS — Z34.92 NORMAL PREGNANCY IN SECOND TRIMESTER: ICD-10-CM

## 2020-02-14 PROCEDURE — 76805 OB US >/= 14 WKS SNGL FETUS: CPT | Performed by: RADIOLOGY

## 2020-02-17 ENCOUNTER — TELEPHONE (OUTPATIENT)
Dept: OBGYN | Facility: CLINIC | Age: 35
End: 2020-02-17

## 2020-02-17 DIAGNOSIS — Z34.92 NORMAL PREGNANCY IN SECOND TRIMESTER: Primary | ICD-10-CM

## 2020-02-17 NOTE — TELEPHONE ENCOUNTER
[facility] Call Center    Phone Message    May a detailed message be left on voicemail: yes     Reason for Call: Other: pt retunring call, please call her back     Action Taken: Message routed to:  [facility] p [phone]    Travel Screening: Not Applicable

## 2020-02-17 NOTE — TELEPHONE ENCOUNTER
Notes recorded by Diana Bishop DO on 2/17/2020 at 2:08 PM CST  I called and left patient a message to call the office back.  Diana Bishop

## 2020-02-25 ENCOUNTER — PRENATAL OFFICE VISIT (OUTPATIENT)
Dept: OBGYN | Facility: CLINIC | Age: 35
End: 2020-02-25
Payer: COMMERCIAL

## 2020-02-25 VITALS
DIASTOLIC BLOOD PRESSURE: 68 MMHG | SYSTOLIC BLOOD PRESSURE: 123 MMHG | OXYGEN SATURATION: 100 % | BODY MASS INDEX: 31.62 KG/M2 | WEIGHT: 192.1 LBS | HEART RATE: 90 BPM

## 2020-02-25 DIAGNOSIS — Z34.92 NORMAL PREGNANCY IN SECOND TRIMESTER: Primary | ICD-10-CM

## 2020-02-25 DIAGNOSIS — F41.1 GENERALIZED ANXIETY DISORDER: ICD-10-CM

## 2020-02-25 PROCEDURE — 99207 ZZC PRENATAL VISIT: CPT | Performed by: OBSTETRICS & GYNECOLOGY

## 2020-02-25 NOTE — PROGRESS NOTES
34 year old  at 21w5d weeks presents to the clinic for a routine prenatal visit.  No concerns.  No leakage of fluid, vaginal bleeding, or contractions   Good fetal movement.  FHTs: 140's  Fundal height: 22cm  Normal anatomy ultrasound but some anatomy not well seen.  Patient has her repeat already scheduled  Anxiety=stable  RTC 4 weeks    Diana Bishop DO

## 2020-02-25 NOTE — PATIENT INSTRUCTIONS
What to watch out for are: regular contractions every 5 min, vaginal bleeding, decreased fetal movement, or leakage of fluid.  Please call the office or go to L&D if you develop any of these signs and symptoms.      I will see you for your follow up appointment.  Please feel free to call if you have any questions or concerns.      Thanks,  Diana Bishop, DO

## 2020-03-11 ENCOUNTER — ANCILLARY PROCEDURE (OUTPATIENT)
Dept: ULTRASOUND IMAGING | Facility: CLINIC | Age: 35
End: 2020-03-11
Attending: OBSTETRICS & GYNECOLOGY
Payer: COMMERCIAL

## 2020-03-11 DIAGNOSIS — Z34.92 NORMAL PREGNANCY IN SECOND TRIMESTER: ICD-10-CM

## 2020-03-11 PROCEDURE — 76816 OB US FOLLOW-UP PER FETUS: CPT

## 2020-03-13 ENCOUNTER — NURSE TRIAGE (OUTPATIENT)
Dept: NURSING | Facility: CLINIC | Age: 35
End: 2020-03-13

## 2020-03-13 ENCOUNTER — TELEPHONE (OUTPATIENT)
Dept: OBGYN | Facility: CLINIC | Age: 35
End: 2020-03-13

## 2020-03-13 ENCOUNTER — MEDICAL CORRESPONDENCE (OUTPATIENT)
Dept: HEALTH INFORMATION MANAGEMENT | Facility: CLINIC | Age: 35
End: 2020-03-13

## 2020-03-13 NOTE — TELEPHONE ENCOUNTER
Dr. Bishop called RN and asked her to place [facility] referral for patient. Patient to be seen at [facility] for a comprehensive level II ultra sound for dilated renal pelves and poorly visualized fetal spine.     RN completed referral and had in clinic provider sign. RN faxed with pregnancy episode and last ultra sound. Copy of referral placed in STAT scanning.     Violet Hair RN on 3/13/2020 at 12:14 PM

## 2020-03-14 NOTE — TELEPHONE ENCOUNTER
Bernice calls and says that she is 24w1d pregnant and had slight blood on the toilet paper after urinating . Blood was a light pink color. Denies any pain or contractions.    Additional Information    Negative: Passed out (i.e., lost consciousness, collapsed and was not responding)    Negative: Shock suspected (e.g., cold/pale/clammy skin, too weak to stand, low BP, rapid pulse)    Negative: Difficult to awaken or acting confused (e.g., disoriented, slurred speech)    Negative: SEVERE vaginal bleeding (e.g., continuous red blood from vagina, large blood clots)    Negative: [1] SEVERE abdominal pain (e.g., excruciating) AND [2] constant AND [3] present > 1 hour    Negative: Sounds like a life-threatening emergency to the triager    Negative: [1] Vaginal bleeding AND [2] pregnant < 20 weeks    Negative: MILD-MODERATE vaginal bleeding (i.e., small to medium clots; like mild menstrual period)    Negative: Abdominal pain or having contractions    Negative: Leakage of fluid from vagina (or caller thinks she has ruptured her bag of paz)    Negative: Baby moving less today (e.g., kick count < 5 in 1 hour or < 10 in 2 hours)    Negative: [1] Pregnant 24-36 weeks () AND [2] pinkish or brownish mucous discharge    Negative: [1] Pregnant 20-23 weeks AND [2] pinkish or brownish mucous discharge    Negative: [1] Pregnant > 36 weeks AND [2] pinkish or brownish mucous discharge    Negative: [1] Pregnant > 36 weeks (term) AND [2] passed a small glob or chunk of mucous (may look like gelatin or snot)    Negative: Slight spotting after sexual intercourse (brief episode)    Negative: Slight spotting after a pelvic examination (brief episode)    Single episode of faint spotting (e.g., noted when wiping after going to bathroom)    Protocols used: PREGNANCY - VAGINAL BLEEDING GREATER THAN 20 WEEKS [facility]-

## 2020-03-27 ENCOUNTER — VIRTUAL VISIT (OUTPATIENT)
Dept: OBGYN | Facility: CLINIC | Age: 35
End: 2020-03-27
Payer: COMMERCIAL

## 2020-03-27 DIAGNOSIS — Z34.92 NORMAL PREGNANCY IN SECOND TRIMESTER: Primary | ICD-10-CM

## 2020-03-27 DIAGNOSIS — F41.1 GENERALIZED ANXIETY DISORDER: ICD-10-CM

## 2020-03-27 PROCEDURE — 99207 ZZC PRENATAL VISIT: CPT | Mod: TEL | Performed by: OBSTETRICS & GYNECOLOGY

## 2020-03-27 NOTE — PROGRESS NOTES
"Bernice Garcia is a 34 year old female who is being evaluated via a billable telephone visit.      The patient has been notified of following:     \"This telephone visit will be conducted via a call between you and your physician/provider. We have found that certain health care needs can be provided without the need for a physical exam.  This service lets us provide the care you need with a short phone conversation.  If a prescription is necessary we can send it directly to your pharmacy.  If lab work is needed we can place an order for that and you can then stop by our lab to have the test done at a later time.    If during the course of the call the physician/provider feels a telephone visit is not appropriate, you will not be charged for this service.\"     Bernice Garcia complains of    Chief Complaint   Patient presents with     Prenatal Care     26w1d       I have reviewed and updated the patient's Past Medical History, Social History, Family History and Medication List.    ALLERGIES  Patient has no known allergies.    Additional provider notes:     34 year old  at 26w1d weeks presents for a telephone visit  No concerns.  No leakage of fluid, vaginal bleeding, or contractions   Good fetal movement.  FHTs: telephone visit  Fundal height: telephone visit  Anxiety=stable  Normal anatomy ultrasound but renal pelves dilated.  Patient to see [facility] on   RTC 2 weeks    Diana Bishop DO    "

## 2020-04-09 DIAGNOSIS — Z34.92 NORMAL PREGNANCY IN SECOND TRIMESTER: ICD-10-CM

## 2020-04-09 LAB
GLUCOSE 1H P 50 G GLC PO SERPL-MCNC: 109 MG/DL (ref 60–129)
HGB BLD-MCNC: 11.3 G/DL (ref 11.7–15.7)

## 2020-04-09 PROCEDURE — 82950 GLUCOSE TEST: CPT | Performed by: OBSTETRICS & GYNECOLOGY

## 2020-04-09 PROCEDURE — 00000218 ZZHCL STATISTIC OBHBG - HEMOGLOBIN: Performed by: OBSTETRICS & GYNECOLOGY

## 2020-04-09 PROCEDURE — 36415 COLL VENOUS BLD VENIPUNCTURE: CPT | Performed by: OBSTETRICS & GYNECOLOGY

## 2020-04-16 ENCOUNTER — TRANSFERRED RECORDS (OUTPATIENT)
Dept: HEALTH INFORMATION MANAGEMENT | Facility: CLINIC | Age: 35
End: 2020-04-16

## 2020-04-24 ENCOUNTER — PRENATAL OFFICE VISIT (OUTPATIENT)
Dept: OBGYN | Facility: CLINIC | Age: 35
End: 2020-04-24
Payer: COMMERCIAL

## 2020-04-24 VITALS
BODY MASS INDEX: 32.26 KG/M2 | DIASTOLIC BLOOD PRESSURE: 78 MMHG | SYSTOLIC BLOOD PRESSURE: 112 MMHG | HEART RATE: 95 BPM | WEIGHT: 196 LBS

## 2020-04-24 DIAGNOSIS — Z34.92 NORMAL PREGNANCY IN SECOND TRIMESTER: ICD-10-CM

## 2020-04-24 DIAGNOSIS — Z23 NEED FOR VACCINATION: Primary | ICD-10-CM

## 2020-04-24 PROCEDURE — 90715 TDAP VACCINE 7 YRS/> IM: CPT | Performed by: OBSTETRICS & GYNECOLOGY

## 2020-04-24 PROCEDURE — 90471 IMMUNIZATION ADMIN: CPT | Performed by: OBSTETRICS & GYNECOLOGY

## 2020-04-24 PROCEDURE — 99207 ZZC PRENATAL VISIT: CPT | Performed by: OBSTETRICS & GYNECOLOGY

## 2020-04-24 NOTE — NURSING NOTE
Prior to immunization administration, verified patients identity using patient s name and date of birth. Please see Immunization Activity for additional information.     Screening Questionnaire for Adult Immunization    Are you sick today?   No   Do you have allergies to medications, food, a vaccine component or latex?   No   Have you ever had a serious reaction after receiving a vaccination?   No   Do you have a long-term health problem with heart, lung, kidney, or metabolic disease (e.g., diabetes), asthma, a blood disorder, no spleen, complement component deficiency, a cochlear implant, or a spinal fluid leak?  Are you on long-term aspirin therapy?   No   Do you have cancer, leukemia, HIV/AIDS, or any other immune system problem?   No   Do you have a parent, brother, or sister with an immune system problem?   No   In the past 3 months, have you taken medications that affect  your immune system, such as prednisone, other steroids, or anticancer drugs; drugs for the treatment of rheumatoid arthritis, Crohn s disease, or psoriasis; or have you had radiation treatments?   No   Have you had a seizure, or a brain or other nervous system problem?   No   During the past year, have you received a transfusion of blood or blood    products, or been given immune (gamma) globulin or antiviral drug?   No   For women: Are you pregnant or is there a chance you could become       pregnant during the next month?   Yes   Have you received any vaccinations in the past 4 weeks?   No     Immunization questionnaire was positive for at least one answer.  Notified Dr.Agbeh.        Per orders of Dr. Agbeh, injection of Tdap (Adacel) given by Tatianna Lake MA. Patient instructed to remain in clinic for 15 minutes afterwards, and to report any adverse reaction to me immediately.       Screening performed by Tatianna Lake MA on 4/24/2020 at 8:38 AM.

## 2020-04-24 NOTE — PROGRESS NOTES
30w1d. .  No HA, visual changes, N/V etc. MFM U/S as bellow. F/U on 5/16/20.  Tdap today. RTC 2 wk/prn.  Impression  =========    1) Intrauterine pregnancy at 29 0/7 weeks gestational age.  2) None of the anomalies commonly detected by ultrasound were evident in the detailed fetal anatomic survey described above, although evaluation of fetal anatomy was  suboptimal as noted above.  3) Growth parameters and estimated fetal weight were consistent with an appropriate for gestation age pattern of growth.  4) The amniotic fluid volume appeared normal.    Recommendation  ==============    We discussed the findings on today's ultrasound with the patient.    A repeat ultrasound has been scheduled here in 4 weeks to reevaluate fetal anatomy that was suboptimally seen today.    Return to primary provider for continued prenatal care.    Thank you for the opportunity to participate in the care of this patient. If you have questions regarding today's evaluation or if we can be of further service, please contact the  [facility].    **Fetal anomalies may be present but not detected**.    REPORT SIGNED BY: ARMANDO MOORE MD    ICD-10-CM    1. Need for vaccination  Z23 TDAP VACCINE (ADACEL) [14681.002]     1st  Administration  [19420]   2. Normal pregnancy in second trimester  Z34.92      CEPHAS AGBEH, MD.

## 2020-05-08 ENCOUNTER — PRENATAL OFFICE VISIT (OUTPATIENT)
Dept: OBGYN | Facility: CLINIC | Age: 35
End: 2020-05-08
Payer: COMMERCIAL

## 2020-05-08 VITALS
OXYGEN SATURATION: 96 % | SYSTOLIC BLOOD PRESSURE: 120 MMHG | WEIGHT: 194.5 LBS | HEART RATE: 114 BPM | BODY MASS INDEX: 32.02 KG/M2 | DIASTOLIC BLOOD PRESSURE: 83 MMHG

## 2020-05-08 DIAGNOSIS — Z34.93 NORMAL PREGNANCY IN THIRD TRIMESTER: Primary | ICD-10-CM

## 2020-05-08 DIAGNOSIS — F41.1 GENERALIZED ANXIETY DISORDER: ICD-10-CM

## 2020-05-08 PROCEDURE — 99207 ZZC PRENATAL VISIT: CPT | Performed by: OBSTETRICS & GYNECOLOGY

## 2020-05-08 RX ORDER — FERROUS SULFATE 325(65) MG
325 TABLET ORAL
COMMUNITY
End: 2020-12-11

## 2020-05-08 NOTE — PROGRESS NOTES
34 year old  at 32w1d weeks presents to the clinic for a routine prenatal visit.    No concerns.  Patient denies any vaginal bleeding, leakage of fluid, or contractions     Good fetal movement  Fundal height=33cm  TESy=773  Anxiety=stable  Pylectasis=repeat MFM ultrasound next week.  Low maternal weight gain.  Growth ultrasound next week  Discussed labor precautions.  RTC 2 weeks    Diana Bishop DO

## 2020-05-11 DIAGNOSIS — F41.1 GENERALIZED ANXIETY DISORDER: ICD-10-CM

## 2020-05-14 ENCOUNTER — TRANSFERRED RECORDS (OUTPATIENT)
Dept: HEALTH INFORMATION MANAGEMENT | Facility: CLINIC | Age: 35
End: 2020-05-14

## 2020-05-14 NOTE — TELEPHONE ENCOUNTER
sertraline (ZOLOFT) 100 MG tablet   sertraline (ZOLOFT) 50 MG tablet     Routing refill request to provider for review/approval because:  Patient currently pregnant, serotonin specific reuptake inhibitor requests to be reviewed by provider.    Maylin Fang RN  [facility]

## 2020-05-17 ENCOUNTER — MYC REFILL (OUTPATIENT)
Dept: FAMILY MEDICINE | Facility: CLINIC | Age: 35
End: 2020-05-17

## 2020-05-17 DIAGNOSIS — F41.1 GENERALIZED ANXIETY DISORDER: ICD-10-CM

## 2020-05-17 RX ORDER — SERTRALINE HYDROCHLORIDE 100 MG/1
100 TABLET, FILM COATED ORAL DAILY
Qty: 90 TABLET | Refills: 3 | Status: CANCELLED | OUTPATIENT
Start: 2020-05-17

## 2020-05-18 RX ORDER — SERTRALINE HYDROCHLORIDE 100 MG/1
100 TABLET, FILM COATED ORAL DAILY
Qty: 90 TABLET | Refills: 3 | Status: SHIPPED | OUTPATIENT
Start: 2020-05-18 | End: 2021-05-26

## 2020-05-19 ENCOUNTER — VIRTUAL VISIT (OUTPATIENT)
Dept: OBGYN | Facility: CLINIC | Age: 35
End: 2020-05-19
Payer: COMMERCIAL

## 2020-05-19 ENCOUNTER — TELEPHONE (OUTPATIENT)
Dept: UROLOGY | Facility: CLINIC | Age: 35
End: 2020-05-19

## 2020-05-19 DIAGNOSIS — Z34.93 NORMAL PREGNANCY IN THIRD TRIMESTER: Primary | ICD-10-CM

## 2020-05-19 DIAGNOSIS — Z34.80 SUPERVISION OF OTHER NORMAL PREGNANCY, ANTEPARTUM: ICD-10-CM

## 2020-05-19 PROBLEM — Z23 NEED FOR TDAP VACCINATION: Status: RESOLVED | Noted: 2019-10-29 | Resolved: 2020-05-19

## 2020-05-19 PROBLEM — O35.EXX0 PYELECTASIS OF FETUS ON PRENATAL ULTRASOUND: Status: ACTIVE | Noted: 2020-05-19

## 2020-05-19 PROCEDURE — 99207 ZZC PRENATAL VISIT: CPT | Performed by: OBSTETRICS & GYNECOLOGY

## 2020-05-19 NOTE — PROGRESS NOTES
"Bernice Garcia is a 34 year old female who is being evaluated via a billable telephone visit.      The patient has been notified of following:     \"This telephone visit will be conducted via a call between you and your physician/provider. We have found that certain health care needs can be provided without the need for a physical exam.  This service lets us provide the care you need with a short phone conversation.  If a prescription is necessary we can send it directly to your pharmacy.  If lab work is needed we can place an order for that and you can then stop by our lab to have the test done at a later time.    Telephone visits are billed at different rates depending on your insurance coverage. During this emergency period, for some insurers they may be billed the same as an in-person visit.  Please reach out to your insurance provider with any questions.    If during the course of the call the physician/provider feels a telephone visit is not appropriate, you will not be charged for this service.\"    Patient has given verbal consent for Telephone visit?  Yes    What phone number would you like to be contacted at? [phone]    Phone call duration: 15 minutes    No significant signs, symptoms or concerns except restless legs and some pelvic pressure. Had satisfactory fetal growth on ultrasound with MFM but persistent fetal pyelectasis.   Total encounter time (physician together with patient) = 15min. Direct counseling, education and care coordination time (physician together with patient) = 10min. This counseling included the following: Advice appropriate to gestational age reviewed including pertinent Checklist items. Discussed condition, tests and care plan including risks, benefits, and alternatives. Problem list updated. GBS next.  A/P:  Bernice was seen today for prenatal care.    Diagnoses and all orders for this visit:    Normal pregnancy in third trimester    Supervision of other normal pregnancy, " antepartum        Eddie Richards MD

## 2020-06-05 ENCOUNTER — PRENATAL OFFICE VISIT (OUTPATIENT)
Dept: OBGYN | Facility: CLINIC | Age: 35
End: 2020-06-05
Payer: COMMERCIAL

## 2020-06-05 VITALS
OXYGEN SATURATION: 95 % | HEART RATE: 104 BPM | BODY MASS INDEX: 32.68 KG/M2 | WEIGHT: 198.5 LBS | SYSTOLIC BLOOD PRESSURE: 130 MMHG | DIASTOLIC BLOOD PRESSURE: 88 MMHG

## 2020-06-05 DIAGNOSIS — Z34.80 SUPERVISION OF OTHER NORMAL PREGNANCY, ANTEPARTUM: Primary | ICD-10-CM

## 2020-06-05 DIAGNOSIS — O35.EXX0 PYELECTASIS OF FETUS ON PRENATAL ULTRASOUND: ICD-10-CM

## 2020-06-05 DIAGNOSIS — F41.1 GENERALIZED ANXIETY DISORDER: ICD-10-CM

## 2020-06-05 PROCEDURE — 99207 ZZC PRENATAL VISIT: CPT | Performed by: OBSTETRICS & GYNECOLOGY

## 2020-06-05 PROCEDURE — 87653 STREP B DNA AMP PROBE: CPT | Performed by: OBSTETRICS & GYNECOLOGY

## 2020-06-05 NOTE — PROGRESS NOTES
34 year old  at 36w1d weeks presents to the clinic for a routine prenatal visit.  No concerns.  No vaginal bleeding, leakage of fluid, or contractions  Fundal height=38cm  FHTs=160  CX=1/Th/-3  GBS done today  Labor precautions discussed  Anxiety=stable  RTC 1 week    Diana Bishop DO

## 2020-06-06 LAB
GP B STREP DNA SPEC QL NAA+PROBE: NEGATIVE
SPECIMEN SOURCE: NORMAL

## 2020-06-11 ENCOUNTER — PRENATAL OFFICE VISIT (OUTPATIENT)
Dept: OBGYN | Facility: CLINIC | Age: 35
End: 2020-06-11
Payer: COMMERCIAL

## 2020-06-11 VITALS
BODY MASS INDEX: 33.2 KG/M2 | SYSTOLIC BLOOD PRESSURE: 128 MMHG | WEIGHT: 201.7 LBS | DIASTOLIC BLOOD PRESSURE: 84 MMHG | HEART RATE: 91 BPM

## 2020-06-11 DIAGNOSIS — Z34.80 SUPERVISION OF OTHER NORMAL PREGNANCY, ANTEPARTUM: ICD-10-CM

## 2020-06-11 PROCEDURE — 99207 ZZC PRENATAL VISIT: CPT | Performed by: OBSTETRICS & GYNECOLOGY

## 2020-06-11 NOTE — PATIENT INSTRUCTIONS
If you have any questions regarding your visit, Please contact your care team.    [facility]: [phone]      [facility] CLINIC HOURS TELEPHONE NUMBER   Francisca Sharp .    QASIM Campuzano -  Debbie -       TYLER Johnson, RN  Anisa, RN     Monday, Wednesday, Thursday and Friday, [place]  8:30a.m-5:00 p.m   [facility]  [address] [place] [address]  [phone] ask for [facility]    Imaging [phone]       Urgent Care locations:    [facility] Saturday and Sunday   9 am - 5 pm    Monday-Friday   12 pm - 8 pm  Saturday and Sunday   9 am - 5 pm   [phone]     [facility] Labor and Delivery:  [phone]    If you need a medication refill, please contact your pharmacy. Please allow 3 business days for your refill to be completed.  As always, Thank you for trusting us with your healthcare needs!

## 2020-06-11 NOTE — PROGRESS NOTES
She reports feeling reassuring daily fetal activity and will continue to record.  She has experienced an increased number of neida-daly contractions but denies any fluid leakage or regular uterine contractions.  She gained 3 lbs since her last visit and her cervix has changed substantially - now 3 cm/50%/-2/intact/vertex.  She understands that her GBS status is negative.

## 2020-06-19 ENCOUNTER — VIRTUAL VISIT (OUTPATIENT)
Dept: UROLOGY | Facility: CLINIC | Age: 35
End: 2020-06-19
Attending: NURSE PRACTITIONER
Payer: COMMERCIAL

## 2020-06-19 ENCOUNTER — PRENATAL OFFICE VISIT (OUTPATIENT)
Dept: OBGYN | Facility: CLINIC | Age: 35
End: 2020-06-19
Payer: COMMERCIAL

## 2020-06-19 ENCOUNTER — MYC MEDICAL ADVICE (OUTPATIENT)
Dept: OBGYN | Facility: CLINIC | Age: 35
End: 2020-06-19

## 2020-06-19 VITALS
HEART RATE: 95 BPM | DIASTOLIC BLOOD PRESSURE: 89 MMHG | BODY MASS INDEX: 32.94 KG/M2 | SYSTOLIC BLOOD PRESSURE: 127 MMHG | OXYGEN SATURATION: 95 % | WEIGHT: 200.1 LBS

## 2020-06-19 DIAGNOSIS — O35.EXX0 PYELECTASIS OF FETUS ON PRENATAL ULTRASOUND: ICD-10-CM

## 2020-06-19 DIAGNOSIS — O35.EXX0 PYELECTASIS OF FETUS ON PRENATAL ULTRASOUND: Primary | ICD-10-CM

## 2020-06-19 DIAGNOSIS — Z34.80 SUPERVISION OF OTHER NORMAL PREGNANCY, ANTEPARTUM: Primary | ICD-10-CM

## 2020-06-19 DIAGNOSIS — F41.1 GENERALIZED ANXIETY DISORDER: ICD-10-CM

## 2020-06-19 PROCEDURE — 99207 ZZC PRENATAL VISIT: CPT | Performed by: OBSTETRICS & GYNECOLOGY

## 2020-06-19 NOTE — NURSING NOTE
"Bernice Garcia is a 34 year old female who is being evaluated via a billable video visit.      The patient has been notified of following:     \"This telephone visit will be conducted via a call between you and your physician/provider. We have found that certain health care needs can be provided without the need for a physical exam.  This service lets us provide the care you need with a short phone conversation.  If a prescription is necessary we can send it directly to your pharmacy.  If lab work is needed we can place an order for that and you can then stop by our lab to have the test done at a later time.    Telephone visits are billed at different rates depending on your insurance coverage. During this emergency period, for some insurers they may be billed the same as an in-person visit.  Please reach out to your insurance provider with any questions.    If during the course of the call the physician/provider feels a telephone visit is not appropriate, you will not be charged for this service.\"     How would you like to obtain your AVS? Shelleyharheydi    Bernice Garcia complains of  No chief complaint on file.      Patient has given verbal consent for Telephone visit?  Yes    I have reviewed and updated the patient's medication list, allergies and preferred pharmacy.    Dariusz Odom LPN  "

## 2020-06-19 NOTE — LETTER
2020      RE: Bernice Garcia  [address]  [place] [address]       Jeanne Alexander  [address]  [facility] [address]    RE:  Bernice Garcia  :  1985  [place] MRN:  [id number]  Date of visit:  2020    Dear Dr. Alexander:    I had the pleasure of speaking with your patient, Bernice, today through the [facility] in urology consultation for the question of prenatally detected pyelectasis.  Please see below the details of this visit and my impression and plans discussed with the family.        CC:  Baby's kidney    HPI:  Bernice Garcia is a 34 year old woman whom I was asked to see in consultation for the above. This is Bernice's second pregnancy. The sex of the fetus is male. At the 33 week ultrasound Right mild dilation of the renal pelvis without dilation of the calyces was noted (UTD A1). So far the pregnancy has been going good. Bernice is planning to deliver at [facility]. There is no family history of genitourinary disorders in childhood.    PMH:  Reviewed, no significant medical history    PSH:   Reviewed, no surgical history      Meds, allergies, family history, social history reviewed per intake form and confirmed in our EMR.    ROS:  Negative on a 12-point scale.  All other pertinent positives mentioned in the HPI.    PE: No PE, telephone encounter  Last menstrual period 2019, not currently breastfeeding.  There is no height or weight on file to calculate BMI.      Impression:  Prenatally detected fetal right pyelectasis, UTD A1.     Plan:    We discussed the likely prenatal causes for this, including prenatal obstructive issues that have already resolved versus those that may need surgical help with resolution in childhood, as well as the possibility of vesicoureteral reflux. We also made our plans for follow-up after the baby is born with renal/bladder ultrasound around 4 weeks  of age. I addressed all questions and encouraged a phone call from their MFM if there are any future concerns during the pregnancy.    Thank you very much for allowing me the opportunity to participate in this nice family's care with you.    Phone call duration: 5 minutes (09:50-09:55)    Sincerely,  KYLE Biggs, CNP  Pediatric Urology  [facility]    KYLE Martin CNP

## 2020-06-19 NOTE — PATIENT INSTRUCTIONS
[facility]   Department of Pediatric Urology  MD Gabriel Roger, MICHAEL Abdi  Nurse Coordinator: BUSTER Ventura, RN [phone]    [facility]  Nurse Coordinator: BUSTER Pedraza, RN [phone]    [place]  Nurse Coordinator: Eufemia Klein RN, BSN, PNT [phone]    [place]  Nurse Coordinator: BUSTER Pichardo, RN [phone]      Once your baby is born, please do the following:    -After you have gone home, please call the Nurse Coordinator at your preferred  location and give her your baby s name and date of birth. She will  help coordinate the tests that need to be done about 4 weeks  after birth.    Your baby s test will include:  -Renal Bladder Ultrasound  (all locations)

## 2020-06-19 NOTE — PROGRESS NOTES
Jeanne Alexander  [address]  [facility] [address] [facility] [address]  [facility] [address]    RE:  Bernice Garcia  :  1985  [place] MRN:  [id number]  Date of visit:  2020    Dear Dr. Alexander:    I had the pleasure of speaking with your patient, Bernice, today through the [facility] in urology consultation for the question of prenatally detected pyelectasis.  Please see below the details of this visit and my impression and plans discussed with the family.        CC:  Baby's kidney    HPI:  Bernice Garcia is a 34 year old woman whom I was asked to see in consultation for the above. This is Bernice's second pregnancy. The sex of the fetus is male. At the 33 week ultrasound Right mild dilation of the renal pelvis without dilation of the calyces was noted (UTD A1). So far the pregnancy has been going good. Bernice is planning to deliver at [facility]. There is no family history of genitourinary disorders in childhood.    PMH:  Reviewed, no significant medical history    PSH:   Reviewed, no surgical history      Meds, allergies, family history, social history reviewed per intake form and confirmed in our EMR.    ROS:  Negative on a 12-point scale.  All other pertinent positives mentioned in the HPI.    PE: No PE, telephone encounter  Last menstrual period 2019, not currently breastfeeding.  There is no height or weight on file to calculate BMI.      Impression:  Prenatally detected fetal right pyelectasis, UTD A1.     Plan:    We discussed the likely prenatal causes for this, including prenatal obstructive issues that have already resolved versus those that may need surgical help with resolution in childhood, as well as the possibility of vesicoureteral reflux. We also made our plans for follow-up after the baby is born with renal/bladder ultrasound around 4 weeks of age. I addressed all questions and encouraged a phone call from their MFM if there are any future  concerns during the pregnancy.    Thank you very much for allowing me the opportunity to participate in this nice family's care with you.    Phone call duration: 5 minutes (09:50-09:55)    Sincerely,  KYLE Biggs, CNP  Pediatric Urology  [facility]

## 2020-06-19 NOTE — PROGRESS NOTES
34 year old  at 38w1d weeks presents to the clinic for a routine prenatal visit.  No concerns.  Complains of feeling more pressure.  No vaginal bleeding, leakage of fluid, or contractions   Blood pressure=127/89  Fundal height=40cm  VHRg=502  CX=3/80/-2  Discussed labor precautions  Anxiety=stable  Right urinary tract dilation=fu with Pediatric Urology  GBS=Negative    Discussed with Bernice the risks, benefits and alternatives to induction.  We discussed cervical ripening for those patients with a mike score of less than 6 (for multiparous) and 8 (for nulliparous).  Discussed the concerns related to uterine hyperstimulation and adverse fetal effects that can occur with a ripening agent or pitocin. Discussed amniotomy and the rationale for it with induction.  I discussed the expected timeline for her based on her presentation, but she understands that this is just an approximate.  She is given the opportunity to ask questions and have them answered.  She does wish to proceed    Induction scheduled for .      Diana Bishop, DO

## 2020-06-21 ENCOUNTER — NURSE TRIAGE (OUTPATIENT)
Dept: NURSING | Facility: CLINIC | Age: 35
End: 2020-06-21

## 2020-06-21 NOTE — TELEPHONE ENCOUNTER
Patient calling reporting she has been having contractions every 5 minutes x 1 hour. Bloody mucus discharge. Patient is 38w3d gestation .     Per guidelines advised labor and delivery now.    [facility] notified.    Caller verbalized understanding. Denies further questions.    Doris Nava RN  [facility]      COVID 19 Nurse Triage Plan/Patient Instructions    Please be aware that novel coronavirus (COVID-19) may be circulating in the community. If you develop symptoms such as fever, cough, or SOB or if you have concerns about the presence of another infection including coronavirus (COVID-19), please contact your health care provider or visit [email or web address].     Disposition/Instructions    Patient to go to ED and follow protocol based instructions.     Bring Your Own Device:  Please also bring your smart device(s) (smart phones, tablets, laptops) and their charging cables for your personal use and to communicate with your care team during your visit.    Thank you for taking steps to prevent the spread of this virus.  o Limit your contact with others.  o Wear a simple mask to cover your cough.  o Wash your hands well and often.    Resources    [facility]: About COVID-19: [email or web address]    CDC: What to Do If You're Sick: www.cdc.gov/coronavirus/2019-ncov/about/steps-when-sick.html    CDC: Ending Home Isolation: www.cdc.gov/coronavirus/2019-ncov/hcp/disposition-in-home-patients.html     CDC: Caring for Someone: www.cdc.gov/coronavirus/2019-ncov/if-you-are-sick/care-for-someone.html     [facility]: Interim Guidance for Hospital Discharge to Home: www.health.state.[place]/diseases/coronavirus/hcp/hospdischarge.pdf    [facility] clinical trials (COVID-19 research studies): clinicalaffairs.[facility]/umn-clinical-trials     Below are the COVID-19 hotlines at the [place] Department of Health ([facility]). Interpreters are available.   o For health questions: Call  "[phone] or [phone] (7 a.m. to 7 p.m.)  o For questions about schools and childcare: Call 028-357-1437 or [phone] (7 a.m. to 7 p.m.)                       Reason for Disposition    [1] History of prior delivery (multipara) AND [2] contractions < 10 minutes apart AND [3] present 1 hour    Additional Information    Negative: Passed out (i.e., lost consciousness, collapsed and was not responding)    Negative: Shock suspected (e.g., cold/pale/clammy skin, too weak to stand, low BP, rapid pulse)    Negative: Difficult to awaken or acting confused (e.g., disoriented, slurred speech)    Negative: [1] SEVERE abdominal pain (e.g., excruciating) AND [2] constant AND [3] present > 1 hour    Negative: Severe bleeding (e.g., continuous red blood from vagina, or large blood clots)    Negative: Umbilical cord hanging out of the vagina (shiny, white, curled appearance, \"like telephone cord\")    Negative: Uncontrollable urge to push (i.e., feels like baby is coming out now)    Negative: Can see baby    Negative: Sounds like a life-threatening emergency to the triager    Negative: Pregnant < 37 weeks (i.e., )    Negative: [1] Uncertain delivery date AND [2] possibly pregnant < 37 weeks (i.e., )    Negative: [1] First baby (primipara) AND [2] contractions < 6 minutes apart  AND [3] present 2 hours    Protocols used: PREGNANCY - LABOR-A-AH      "

## 2020-08-24 ENCOUNTER — MEDICAL CORRESPONDENCE (OUTPATIENT)
Dept: HEALTH INFORMATION MANAGEMENT | Facility: CLINIC | Age: 35
End: 2020-08-24

## 2020-10-12 ENCOUNTER — VIRTUAL VISIT (OUTPATIENT)
Dept: FAMILY MEDICINE | Facility: OTHER | Age: 35
End: 2020-10-12

## 2020-10-12 NOTE — PROGRESS NOTES
"Date: 10/12/2020 08:10:46  Clinician: Isidra Alfaro  Clinician NPI: [id number]  Patient: Bernice Garcia  Patient : 1985  Patient Address: [address] [place] [address], [place] [address]  Patient Phone: [phone]  Visit Protocol: URI  Patient Summary:  Bernice is a 35 year old ( : 1985 ) female who initiated a [facility] Visit for COVID-19 (Coronavirus) evaluation and screening. When asked the question \"Please sign me up to receive news, health information and promotions. \", Bernice responded \"No\".    Bernice states her symptoms started 1-2 days ago.   Her symptoms consist of a headache, a cough, and a sore throat.   Symptom details     Cough: Bernice coughs a few times an hour and her cough is more bothersome at night. Phlegm does not come into her throat when she coughs. She does not believe her cough is caused by post-nasal drip.     Sore throat: Bernice reports having mild throat pain (1-3 on a 10 point pain scale), does not have exudate on her tonsils, and can swallow liquids. The lymph nodes in her neck are not enlarged. A rash has not appeared on the skin since the sore throat started.     Headache: She states the headache is mild (1-3 on a 10 point pain scale).      Bernice denies having ear pain, wheezing, fever, enlarged lymph nodes, nasal congestion, anosmia, vomiting, rhinitis, nausea, facial pain or pressure, myalgias, chills, malaise, teeth pain, ageusia, and diarrhea. She also denies taking antibiotic medication in the past month and having recent facial or sinus surgery in the past 60 days. She is not experiencing dyspnea.   Precipitating events  eBrnice is not sure if she has been exposed to someone with strep throat. She has not recently been exposed to someone with influenza. Bernice has been in close contact with the following high risk individuals: people with asthma, heart disease or diabetes, immunocompromised people, and children under the age of 5.   Pertinent COVID-19 (Coronavirus) " information  In the past 14 days, Bernice has not worked in a congregate living setting.   She does not work or volunteer as healthcare worker or a  and does not work or volunteer in a healthcare facility.   Bernice also has not lived in a congregate living setting in the past 14 days. She does not live with a healthcare worker.   Bernice has not had a close contact with a laboratory-confirmed COVID-19 patient within 14 days of symptom onset.   Since December 2019, Bernice and has had upper respiratory infection (URI) or influenza-like illness. Has not been diagnosed with lab-confirmed COVID-19 test      Date(s) of previous URI or influenza-like illness (free-text): Mid March 2020     Symptoms Bernice experienced during previous URI or influenza-like illness as reported by the patient (free-text): Severe dry cough,  headache,  sore throat,  runny nose,  chest congestion        Pertinent medical history  Bernice does not get yeast infections when she takes antibiotics.   Bernice needs a return to work/school note.   Weight: 180 lbs   Bernice does not smoke or use smokeless tobacco.   She denies pregnancy and denies breastfeeding. She has menstruated in the past month.   Weight: 180 lbs    MEDICATIONS: Zoloft oral, ALLERGIES: NKDA  Clinician Response:  Dear Bernice,   Your symptoms show that you may have coronavirus (COVID-19). This illness can cause fever, cough and trouble breathing. Many people get a mild case and get better on their own. Some people can get very sick.  What should I do?  We would like to test you for this virus.   1. Please call [phone] to schedule your visit. Explain that you were referred by [facility] to have a COVID-19 test. Be ready to share your [facility] visit ID number.  The following will serve as your written order for this COVID Test, ordered by me, for the indication of suspected COVID [Z20.828]: The test will be ordered in [employer], our electronic health record, after you are  "scheduled. It will show as ordered and authorized by Abhinav Haas MD.  Order: COVID-19 (Coronavirus) PCR [facility]OMATIC [facility].      2. When it's time for your COVID test:  Stay at least 6 feet away from others. (If someone will drive you to your test, stay in the backseat, as far away from the  as you can.)   Cover your mouth and nose with a mask, tissue or washcloth.  Go straight to the testing site. Don't make any stops on the way there or back.      3.Starting now: Stay home and away from others (self-isolate) until:   You've had no fever---and no medicine that reduces fever---for one full day (24 hours). And...   Your other symptoms have gotten better. For example, your cough or breathing has improved. And...   At least 10 days have passed since your symptoms started.       During this time, don't leave the house except for testing or medical care.   Stay in your own room, even for meals. Use your own bathroom if you can.   Stay away from others in your home. No hugging, kissing or shaking hands. No visitors.  Don't go to work, school or anywhere else.    Clean \"high touch\" surfaces often (doorknobs, counters, handles, etc.). Use a household cleaning spray or wipes. You'll find a full list of  on the EPA website: www.epa.gov/pesticide-registration/list-n-disinfectants-use-against-sars-cov-2.   Cover your mouth and nose with a mask, tissue or washcloth to avoid spreading germs.  Wash your hands and face often. Use soap and water.  Caregivers in these groups are at risk for severe illness due to COVID-19:  o People 65 years and older  o People who live in a nursing home or long-term care facility  o People with chronic disease (lung, heart, cancer, diabetes, kidney, liver, immunologic)  o People who have a weakened immune system, including those who:   Are in cancer treatment  Take medicine that weakens the immune system, such as corticosteroids  Had a bone marrow or organ transplant  " Have an immune deficiency  Have poorly controlled HIV or AIDS  Are obese (body mass index of 40 or higher)  Smoke regularly   o Caregivers should wear gloves while washing dishes, handling laundry and cleaning bedrooms and bathrooms.  o Use caution when washing and drying laundry: Don't shake dirty laundry, and use the warmest water setting that you can.  o For more tips, go to www.cdc.gov/coronavirus/2019-ncov/downloads/10Things.pdf.    4.Sign up for [employer]. We know it's scary to hear that you might have COVID-19. We want to track your symptoms to make sure you're okay over the next 2 weeks. Please look for an email from [employer]---this is a free, online program that we'll use to keep in touch. To sign up, follow the link in the email. Learn more at [email or web address]  How can I take care of myself?   Get lots of rest. Drink extra fluids (unless a doctor has told you not to).   Take Tylenol (acetaminophen) for fever or pain. If you have liver or kidney problems, ask your family doctor if it's okay to take Tylenol.   Adults can take either:    650 mg (two 325 mg pills) every 4 to 6 hours, or...   1,000 mg (two 500 mg pills) every 8 hours as needed.    Note: Don't take more than 3,000 mg in one day. Acetaminophen is found in many medicines (both prescribed and over-the-counter medicines). Read all labels to be sure you don't take too much.   For children, check the Tylenol bottle for the right dose. The dose is based on the child's age or weight.    If you have other health problems (like cancer, heart failure, an organ transplant or severe kidney disease): Call your specialty clinic if you don't feel better in the next 2 days.       Know when to call 911. Emergency warning signs include:    Trouble breathing or shortness of breath Pain or pressure in the chest that doesn't go away Feeling confused like you haven't felt before, or not being able to wake up Bluish-colored lips or face.  Where  can I get more information?   [facility] -- About COVID-19: [email or web address]   CDC -- What to Do If You're Sick: www.cdc.gov/coronavirus/2019-ncov/about/steps-when-sick.html   CDC -- Ending Home Isolation: www.cdc.gov/coronavirus/2019-ncov/hcp/disposition-in-home-patients.html   [facility] -- Caring for Someone: www.cdc.gov/coronavirus/2019-ncov/if-you-are-sick/care-for-someone.html   [facility] -- Interim Guidance for Hospital Discharge to Home: www.[facility].[place]/diseases/coronavirus/hcp/hospdischarge.pdf   [facility] clinical trials (COVID-19 research studies): clinicalaffairs.[facility]-clinical-trials    Below are the COVID-19 hotlines at the [place] Department of Health ([facility]). Interpreters are available.    For health questions: Call 733-400-0056 or 1-795.505.6664 (7 a.m. to 7 p.m.) For questions about schools and childcare: Call 346-249-5341 or 1-506.631.4145 (7 a.m. to 7 p.m.)    Diagnosis: Cough  Diagnosis ICD: R05

## 2020-10-27 ENCOUNTER — MEDICAL CORRESPONDENCE (OUTPATIENT)
Dept: HEALTH INFORMATION MANAGEMENT | Facility: CLINIC | Age: 35
End: 2020-10-27

## 2020-11-18 ENCOUNTER — E-VISIT (OUTPATIENT)
Dept: URGENT CARE | Facility: URGENT CARE | Age: 35
End: 2020-11-18
Payer: COMMERCIAL

## 2020-11-18 DIAGNOSIS — Z20.822 SUSPECTED COVID-19 VIRUS INFECTION: Primary | ICD-10-CM

## 2020-11-18 PROCEDURE — 99421 OL DIG E/M SVC 5-10 MIN: CPT | Performed by: PHYSICIAN ASSISTANT

## 2020-11-18 NOTE — PATIENT INSTRUCTIONS
Dear Bernice Garcia,    Your symptoms show that you may have coronavirus (COVID-19). This illness can cause fever, cough and trouble breathing. Many people get a mild case and get better on their own. Some people can get very sick.    Will I be tested for COVID-19?  We would like to test you for Covid-19 virus. I have placed orders for this test.   To schedule: go to your [facility] home page and scroll down to the section that says  You have an appointment that needs to be scheduled  and click the large green button that says  Schedule Now  and follow the steps to find the next available openings.    If you are unable to complete these [facility] scheduling steps, please call [phone] to schedule your testing.     When it's time for your COVID test:  Stay at least 6 feet away from others. (If someone will drive you to your test, stay in the backseat, as far away from the  as you can.)  Cover your mouth and nose with a mask, tissue or washcloth.  Go straight to the testing site. Don't make any stops on the way there or back.    Starting now:     Do not go to work. Follow your usual processes for taking time away from work.  o If you receive a negative COVID-19 test result and were NOT exposed to someone with a known positive COVID-19 test, you can return to work once you're free of fever for 24 hours without fever-reducing medication and your symptoms are improving or resolved.  o If you receive a positive COVID-19 test result, return to work should be at least 10 days from symptom onset (20 days if people have immune compromise) and people should be fever-free for 24 hours without medications, and the respiratory symptoms should be improved significantly before returning to work or school  o If you were exposed to someone who has tested positive for COVID-19, you can return to work 14 days after your last contact with the positive individual, provided you do not have symptoms at all during that  "time.    During this time, don't leave the house except for testing or medical care.  o Stay in your own room, even for meals. Use your own bathroom if you can.  o Stay away from others in your home. No hugging, kissing or shaking hands. No visitors.  o Don't go to work, school or anywhere else.    Clean \"high touch\" surfaces often (doorknobs, counters, handles, etc.). Use a household cleaning spray or wipes. You'll find a full list of  on the EPA website: www.epa.gov/pesticide-registration/list-n-disinfectants-use-against-sars-cov-2.    Cover your mouth and nose with a mask, tissue or washcloth to avoid spreading germs.    Wash your hands and face often. Use soap and water.    People in these groups are at risk for severe illness due to COVID-19:  o People 65 years and older  o People who live in a nursing home or long-term care facility  o People with chronic disease (lung, heart, cancer, diabetes, kidney, liver, immunologic)  o People who have a weakened immune system, including those who:  - Are in cancer treatment  - Take medicine that weakens the immune system, such as corticosteroids  - Had a bone marrow or organ transplant  - Have an immune deficiency  - Have poorly controlled HIV or AIDS  - Are obese (body mass index of 40 or higher)  - Smoke regularly      Caregivers should wear gloves while washing dishes, handling laundry and cleaning bedrooms and bathrooms.    Use caution when washing and drying laundry: Don't shake dirty laundry, and use the warmest water setting that you can.    For more tips, go to www.cdc.gov/coronavirus/2019-ncov/downloads/10Things.pdf.    Sign up for [employer]. We know it's scary to hear that you might have COVID-19. We want to track your symptoms to make sure you're okay over the next 2 weeks. Please look for an email from [employer]--this is a free, online program that we'll use to keep in touch. To sign up, follow the link in the email you will receive. Learn more " at [email or web address]    How can I take care of myself?    Get lots of rest. Drink extra fluids (unless a doctor has told you not to)    Take Tylenol (acetaminophen) for fever or pain. If you have liver or kidney problems, ask your family doctor if it's okay to take Tylenol.  Adults can take either:    650 mg (two 325 mg pills) every 4 to 6 hours, or     1,000 mg (two 500 mg pills) every 8 hours as needed.    Note: Don't take more than 3,000 mg in one day. Acetaminophen is found in many medicines (both prescribed and over-the-counter medicines). Read all labels to be sure you don't take too much.  For children, check the Tylenol bottle for the right dose. The dose is based on the child's age or weight.    If you have other health problems (like cancer, heart failure, an organ transplant or severe kidney disease): Call your specialty clinic if you don't feel better in the next 2 days.    Know when to call 911. Emergency warning signs include:  Trouble breathing or shortness of breath  Pain or pressure in the chest that doesn't go away  Feeling confused like you haven't felt before, or not being able to wake up  Bluish-colored lips or face    Where can I get more information?    [facility] - About COVID-19: [email or web address]  CDC - What to Do If You're Sick: www.cdc.gov/coronavirus/2019-ncov/about/steps-when-sick.html    November 18, 2020    RE:  Bernice Garcia                                                                                                                                                       [address] [place] [address] [address]  [facility] [address]        To whom it may concern:    I evaluated Bernice Garcia on November 18, 2020. Bernice ALDAIR Srinivasanxi should be excused from work/school.     Return to work should be at least 10 days from when symptoms first started (20 days if immunocompromised) and people should be fever-free for 24 hours without medications, and  the respiratory symptoms should be improved significantly before returning to work or school.       Sincerely,  Cesar Patel PA-C

## 2020-12-04 ASSESSMENT — PATIENT HEALTH QUESTIONNAIRE - PHQ9
SUM OF ALL RESPONSES TO PHQ QUESTIONS 1-9: 7
10. IF YOU CHECKED OFF ANY PROBLEMS, HOW DIFFICULT HAVE THESE PROBLEMS MADE IT FOR YOU TO DO YOUR WORK, TAKE CARE OF THINGS AT HOME, OR GET ALONG WITH OTHER PEOPLE: NOT DIFFICULT AT ALL
SUM OF ALL RESPONSES TO PHQ QUESTIONS 1-9: 7

## 2020-12-05 ASSESSMENT — PATIENT HEALTH QUESTIONNAIRE - PHQ9: SUM OF ALL RESPONSES TO PHQ QUESTIONS 1-9: 7

## 2020-12-11 ENCOUNTER — PRENATAL OFFICE VISIT (OUTPATIENT)
Dept: OBGYN | Facility: OTHER | Age: 35
End: 2020-12-11
Payer: COMMERCIAL

## 2020-12-11 VITALS — BODY MASS INDEX: 31.11 KG/M2 | DIASTOLIC BLOOD PRESSURE: 78 MMHG | WEIGHT: 189 LBS | SYSTOLIC BLOOD PRESSURE: 128 MMHG

## 2020-12-11 DIAGNOSIS — R61 NIGHT SWEATS: ICD-10-CM

## 2020-12-11 DIAGNOSIS — R41.3 MEMORY LOSS: ICD-10-CM

## 2020-12-11 DIAGNOSIS — R10.2 PELVIC PAIN IN FEMALE: ICD-10-CM

## 2020-12-11 DIAGNOSIS — Z30.09 BIRTH CONTROL COUNSELING: Primary | ICD-10-CM

## 2020-12-11 PROBLEM — O35.EXX0 PYELECTASIS OF FETUS ON PRENATAL ULTRASOUND: Status: RESOLVED | Noted: 2020-05-19 | Resolved: 2020-12-11

## 2020-12-11 PROBLEM — Z34.80 SUPERVISION OF OTHER NORMAL PREGNANCY, ANTEPARTUM: Status: RESOLVED | Noted: 2020-05-19 | Resolved: 2020-12-11

## 2020-12-11 LAB
FSH SERPL-ACNC: 4.4 IU/L
TSH SERPL DL<=0.005 MIU/L-ACNC: 0.51 MU/L (ref 0.4–4)

## 2020-12-11 PROCEDURE — 36415 COLL VENOUS BLD VENIPUNCTURE: CPT | Performed by: OBSTETRICS & GYNECOLOGY

## 2020-12-11 PROCEDURE — 84443 ASSAY THYROID STIM HORMONE: CPT | Performed by: OBSTETRICS & GYNECOLOGY

## 2020-12-11 PROCEDURE — 83001 ASSAY OF GONADOTROPIN (FSH): CPT | Performed by: OBSTETRICS & GYNECOLOGY

## 2020-12-11 PROCEDURE — 99214 OFFICE O/P EST MOD 30 MIN: CPT | Performed by: OBSTETRICS & GYNECOLOGY

## 2020-12-11 NOTE — PROGRESS NOTES
Subjective  35 year old non-pregnant female presents today complaining of pelvic pain after her c section.  Patient states she has occasional pelvic pain bilaterally especially with bowel movements since her  6 months ago.  I recommend stool softeners.  She is also wanting to do something for birth control.  She thinks she wants the Nexplanon.  She has done the patch in the past.  She is not good about using it.  She has just been using the withdrawal method and has recent intercourse.  I recommend she abstaine for 2 weeks prior to insertion so she will call to schedule this.  She complains of night sweats and this has been happening for a long time.  She states she has brought this up to many providers but never has an answer.  She has never really had a work up.  She did have a negative chest xray last year.  I recommend she follow up with FP but will check some labs today.  She feels she has had decreased ability to focus.  She wonders if she has ADHD.  She also notices her memory has decreased too.  She cannot remember certain things.  She has been dealing with this for awhile now.  She has a family history of early onset dementia.  I recommend she follow up with FP for this.        ROS: 10 point ROS neg other than the symptoms noted above in the HPI.  Past Medical History:   Diagnosis Date     Depression with anxiety 3/28/2014     Past Surgical History:   Procedure Laterality Date     NO HISTORY OF SURGERY       Family History   Problem Relation Age of Onset     Alzheimer Disease Maternal Grandmother      Diabetes Maternal Grandfather      Alzheimer Disease Maternal Grandfather      Depression Sister      Cancer Daughter      Glaucoma No family hx of      Macular Degeneration No family hx of      Social History     Tobacco Use     Smoking status: Former Smoker     Quit date: 2016     Years since quittin.9     Smokeless tobacco: Never Used   Substance Use Topics     Alcohol use: Not Currently          Objective  Vitals: /78   Wt 85.7 kg (189 lb)   LMP 11/21/2020 (Exact Date)   Breastfeeding No   BMI 31.11 kg/m    BMI= Body mass index is 31.11 kg/m .    General appearance=well developed, well-nourished female  Gait=normal  Psych=mood is stable, alert and oriented x3      Assessment  1.)  Occasional pelvic pain asso with bowel movements  2.)  Birth control counseling  3.)  Memory loss  4.)  Difficulty concentrating  5.)  Night sweats       Plan  1.)  TSH and FSH  2.)  Schedule Nexplanon insertion  3.)  Follow-up with FP      Nursing notes read and reviewed    Diana Bishop DO

## 2020-12-11 NOTE — PATIENT INSTRUCTIONS
Please call if you any questions.    [address]   [address]  [phone]        Diana Bishop,

## 2020-12-20 ENCOUNTER — HEALTH MAINTENANCE LETTER (OUTPATIENT)
Age: 35
End: 2020-12-20

## 2021-01-05 ENCOUNTER — OFFICE VISIT (OUTPATIENT)
Dept: OBGYN | Facility: CLINIC | Age: 36
End: 2021-01-05
Payer: COMMERCIAL

## 2021-01-05 VITALS
HEART RATE: 62 BPM | SYSTOLIC BLOOD PRESSURE: 131 MMHG | WEIGHT: 191.4 LBS | OXYGEN SATURATION: 100 % | DIASTOLIC BLOOD PRESSURE: 87 MMHG | BODY MASS INDEX: 31.51 KG/M2

## 2021-01-05 DIAGNOSIS — Z30.017 NEXPLANON INSERTION: Primary | ICD-10-CM

## 2021-01-05 PROBLEM — Z97.5 NEXPLANON IN PLACE: Status: ACTIVE | Noted: 2021-01-05

## 2021-01-05 LAB — HCG UR QL: NEGATIVE

## 2021-01-05 PROCEDURE — 11981 INSERTION DRUG DLVR IMPLANT: CPT | Performed by: OBSTETRICS & GYNECOLOGY

## 2021-01-05 PROCEDURE — 81025 URINE PREGNANCY TEST: CPT | Performed by: OBSTETRICS & GYNECOLOGY

## 2021-01-05 NOTE — PROGRESS NOTES
Subjective  35 year old non-pregnant female presents today for a Nexplanon insertion.  We discussed this at her postpartum visit.  No questions.        ROS: 10 point ROS neg other than the symptoms noted above in the HPI.  Past Medical History:   Diagnosis Date     Depression with anxiety 3/28/2014     Past Surgical History:   Procedure Laterality Date     NO HISTORY OF SURGERY       Family History   Problem Relation Age of Onset     Alzheimer Disease Maternal Grandmother      Diabetes Maternal Grandfather      Alzheimer Disease Maternal Grandfather      Depression Sister      Cancer Daughter      Glaucoma No family hx of      Macular Degeneration No family hx of      Social History     Tobacco Use     Smoking status: Former Smoker     Quit date: 2016     Years since quittin.0     Smokeless tobacco: Never Used   Substance Use Topics     Alcohol use: Not Currently         Objective  Vitals: /87 (BP Location: Right arm, Cuff Size: Adult Regular)   Pulse 62   Wt 86.8 kg (191 lb 6.4 oz)   LMP 2020   SpO2 100%   BMI 31.51 kg/m    BMI= Body mass index is 31.51 kg/m .    Procedure Note:     Placement of Nexplanon for birth control.  Pregnancy test=  Nexplanon lot#=[id number] Exp 11/10/2022  NDC#=0838-1416-59    The patient is positioned with her left arm flexed at the elbow.  A point ~6 cm from the epicondyle is marked and another point 6 cm caudal to this is marked on the inner arm.  This area is cleansed with betadine and then injected with 1% lidocaine with epi.  The Nexplanon device is opened and it is confirmed that the urbano is in the device.  A small stab incision is made at the point closer to the elbow.  The Nexplanon device is then placed just under the skin with care not to go too deep.  The device is then slowly removed, leaving the urbano behind.  The urbano is palpable in the appropriate place under the skin.  The incision is noted to be hemostatic, steri strips and bandaid applied, and the area  is wrapped with a 4x4 and tape.      There were no complications and minimal blood loss.      Assessment  1.)  Nexplanon insertion        Plan  1.)  Follow-up as needed         Nursing notes read and reviewed    Diana Bishop DO

## 2021-02-02 ENCOUNTER — OFFICE VISIT (OUTPATIENT)
Dept: OBGYN | Facility: CLINIC | Age: 36
End: 2021-02-02
Payer: COMMERCIAL

## 2021-02-02 VITALS
DIASTOLIC BLOOD PRESSURE: 86 MMHG | WEIGHT: 191.5 LBS | SYSTOLIC BLOOD PRESSURE: 140 MMHG | BODY MASS INDEX: 31.52 KG/M2 | HEART RATE: 69 BPM | TEMPERATURE: 98.4 F

## 2021-02-02 DIAGNOSIS — N89.8 VAGINAL ODOR: Primary | ICD-10-CM

## 2021-02-02 LAB
SPECIMEN SOURCE: ABNORMAL
WET PREP SPEC: ABNORMAL

## 2021-02-02 PROCEDURE — 99213 OFFICE O/P EST LOW 20 MIN: CPT | Performed by: ADVANCED PRACTICE MIDWIFE

## 2021-02-02 PROCEDURE — 87210 SMEAR WET MOUNT SALINE/INK: CPT | Performed by: ADVANCED PRACTICE MIDWIFE

## 2021-02-02 RX ORDER — METRONIDAZOLE 500 MG/1
500 TABLET ORAL 2 TIMES DAILY
Qty: 14 TABLET | Refills: 0 | Status: SHIPPED | OUTPATIENT
Start: 2021-02-02 | End: 2021-02-09

## 2021-02-02 NOTE — PROGRESS NOTES
Bernice Garcia is a 35 year old who presents to the clinic for evaluation of vaginal changes.  Had her nexplanon placed last month and since then has had daily vaginal spotting/bleeding.   She has begun to notice what she terms if a foul odor.       Vaginal Symptoms      Onset several weeks number of episodes of vaginitis in the past year 0    Description  odor    Intensity:  severe    Accompanying signs and symptoms (fever/dysuria/abdominal or back pain): None    History  Sexually active: yes, single partner, contraception - nexplanon  Currently pregnant: no  Possibility of pregnancy: No  Recent antibiotic use: no   Diabetes: no  Precipitating or alleviating factors: ? bleeding    Therapies tried and outcome: none   Outcome: NA         Histories reviewed and updated  Past Medical History:   Diagnosis Date     Depression with anxiety 3/28/2014     Past Surgical History:   Procedure Laterality Date     NO HISTORY OF SURGERY       Social History     Socioeconomic History     Marital status:      Spouse name: Chris     Number of children: 1     Years of education: Not on file     Highest education level: Not on file   Occupational History     Occupation:      Comment: [employer]   Social Needs     Financial resource strain: Not on file     Food insecurity     Worry: Not on file     Inability: Not on file     Transportation needs     Medical: Not on file     Non-medical: Not on file   Tobacco Use     Smoking status: Former Smoker     Quit date: 2016     Years since quittin.0     Smokeless tobacco: Never Used   Substance and Sexual Activity     Alcohol use: Not Currently     Drug use: No     Sexual activity: Yes     Partners: Male     Birth control/protection: Implant   Lifestyle     Physical activity     Days per week: Not on file     Minutes per session: Not on file     Stress: Not on file   Relationships     Social connections     Talks on phone: Not on file     Gets together: Not on file      Attends Orthodox service: Not on file     Active member of club or organization: Not on file     Attends meetings of clubs or organizations: Not on file     Relationship status: Not on file     Intimate partner violence     Fear of current or ex partner: Not on file     Emotionally abused: Not on file     Physically abused: Not on file     Forced sexual activity: Not on file   Other Topics Concern     Parent/sibling w/ CABG, MI or angioplasty before 65F 55M? Not Asked   Social History Narrative     Not on file     Family History   Problem Relation Age of Onset     Alzheimer Disease Maternal Grandmother      Diabetes Maternal Grandfather      Alzheimer Disease Maternal Grandfather      Depression Sister      Cancer Daughter      Glaucoma No family hx of      Macular Degeneration No family hx of             ROS: 10 point ROS neg other than the symptoms noted above in the HPI.      EXAM:  BP (!) 140/86 (BP Location: Right arm, Patient Position: Sitting, Cuff Size: Adult Large)   Pulse 69   Temp 98.4  F (36.9  C) (Oral)   Wt 86.9 kg (191 lb 8 oz)   LMP 01/24/2021 (Exact Date)   BMI 31.52 kg/m    Patient appears well, vital signs normal.   Abdomen normal, soft without tenderness, guarding, mass or organomegaly.  No inguinal adenopathy or CVA tenderness.    : PELVIC EXAM:  Vulva: No external lesions, normal hair distribution, no adenopathy, BUS WNL  Vagina: Moist, pink, no abnormal discharge, well rugated, no lesions  Cervix: smooth, pink, no visible lesionsRectal exam: deferred    WET PREP: clue cells       ASSESSMENT:   bacterial vaginosis.      PLAN:   (N89.8) Vaginal odor  (primary encounter diagnosis)  Comment:   Plan: Wet prep, metroNIDAZOLE (FLAGYL) 500 MG tablet              Birth control method reviewed.  Return if symptoms do not resolve as anticipated.        Lab results reviewed and indicate BV due to clue cells.     15 min spent in visit, documentation and review of records.

## 2021-03-10 ENCOUNTER — OFFICE VISIT (OUTPATIENT)
Dept: FAMILY MEDICINE | Facility: CLINIC | Age: 36
End: 2021-03-10
Payer: COMMERCIAL

## 2021-03-10 VITALS
HEART RATE: 89 BPM | BODY MASS INDEX: 30.7 KG/M2 | DIASTOLIC BLOOD PRESSURE: 70 MMHG | HEIGHT: 66 IN | TEMPERATURE: 99.1 F | SYSTOLIC BLOOD PRESSURE: 130 MMHG | OXYGEN SATURATION: 100 % | RESPIRATION RATE: 16 BRPM | WEIGHT: 191 LBS

## 2021-03-10 DIAGNOSIS — R10.13 ABDOMINAL PAIN, EPIGASTRIC: Primary | ICD-10-CM

## 2021-03-10 DIAGNOSIS — R10.84 ABDOMINAL PAIN, GENERALIZED: Primary | ICD-10-CM

## 2021-03-10 DIAGNOSIS — Z11.59 NEED FOR HEPATITIS C SCREENING TEST: ICD-10-CM

## 2021-03-10 LAB
ALBUMIN SERPL-MCNC: 4.1 G/DL (ref 3.4–5)
ALP SERPL-CCNC: 60 U/L (ref 40–150)
ALT SERPL W P-5'-P-CCNC: 22 U/L (ref 0–50)
ANION GAP SERPL CALCULATED.3IONS-SCNC: 6 MMOL/L (ref 3–14)
AST SERPL W P-5'-P-CCNC: 14 U/L (ref 0–45)
BASOPHILS # BLD AUTO: 0 10E9/L (ref 0–0.2)
BASOPHILS NFR BLD AUTO: 0.4 %
BILIRUB SERPL-MCNC: 0.3 MG/DL (ref 0.2–1.3)
BUN SERPL-MCNC: 16 MG/DL (ref 7–30)
CALCIUM SERPL-MCNC: 9.6 MG/DL (ref 8.5–10.1)
CHLORIDE SERPL-SCNC: 103 MMOL/L (ref 94–109)
CO2 SERPL-SCNC: 26 MMOL/L (ref 20–32)
CREAT SERPL-MCNC: 0.8 MG/DL (ref 0.52–1.04)
DIFFERENTIAL METHOD BLD: NORMAL
EOSINOPHIL # BLD AUTO: 0.1 10E9/L (ref 0–0.7)
EOSINOPHIL NFR BLD AUTO: 1.7 %
ERYTHROCYTE [DISTWIDTH] IN BLOOD BY AUTOMATED COUNT: 12.1 % (ref 10–15)
GFR SERPL CREATININE-BSD FRML MDRD: >90 ML/MIN/{1.73_M2}
GLUCOSE SERPL-MCNC: 90 MG/DL (ref 70–99)
HCT VFR BLD AUTO: 40.3 % (ref 35–47)
HGB BLD-MCNC: 13.6 G/DL (ref 11.7–15.7)
LYMPHOCYTES # BLD AUTO: 2.5 10E9/L (ref 0.8–5.3)
LYMPHOCYTES NFR BLD AUTO: 31.3 %
MCH RBC QN AUTO: 29.4 PG (ref 26.5–33)
MCHC RBC AUTO-ENTMCNC: 33.7 G/DL (ref 31.5–36.5)
MCV RBC AUTO: 87 FL (ref 78–100)
MONOCYTES # BLD AUTO: 0.5 10E9/L (ref 0–1.3)
MONOCYTES NFR BLD AUTO: 6.5 %
NEUTROPHILS # BLD AUTO: 4.9 10E9/L (ref 1.6–8.3)
NEUTROPHILS NFR BLD AUTO: 60.1 %
PLATELET # BLD AUTO: 260 10E9/L (ref 150–450)
POTASSIUM SERPL-SCNC: 3.8 MMOL/L (ref 3.4–5.3)
PROT SERPL-MCNC: 7.6 G/DL (ref 6.8–8.8)
RBC # BLD AUTO: 4.62 10E12/L (ref 3.8–5.2)
SODIUM SERPL-SCNC: 135 MMOL/L (ref 133–144)
TSH SERPL DL<=0.005 MIU/L-ACNC: 0.4 MU/L (ref 0.4–4)
WBC # BLD AUTO: 8.1 10E9/L (ref 4–11)

## 2021-03-10 PROCEDURE — 99203 OFFICE O/P NEW LOW 30 MIN: CPT | Performed by: FAMILY MEDICINE

## 2021-03-10 PROCEDURE — 36415 COLL VENOUS BLD VENIPUNCTURE: CPT | Performed by: FAMILY MEDICINE

## 2021-03-10 PROCEDURE — 83516 IMMUNOASSAY NONANTIBODY: CPT | Performed by: FAMILY MEDICINE

## 2021-03-10 PROCEDURE — 80050 GENERAL HEALTH PANEL: CPT | Performed by: FAMILY MEDICINE

## 2021-03-10 RX ORDER — POLYETHYLENE GLYCOL 3350 17 G/17G
17 POWDER, FOR SOLUTION ORAL DAILY
Qty: 510 G | Refills: 0 | Status: SHIPPED | OUTPATIENT
Start: 2021-03-10 | End: 2021-09-16

## 2021-03-10 ASSESSMENT — ENCOUNTER SYMPTOMS
ARTHRALGIAS: 0
CONSTIPATION: 0
BELCHING: 1
ANOREXIA: 0
DIARRHEA: 0
ABDOMINAL PAIN: 1
DYSURIA: 0

## 2021-03-10 ASSESSMENT — MIFFLIN-ST. JEOR: SCORE: 1585.37

## 2021-03-10 ASSESSMENT — PAIN SCALES - GENERAL: PAINLEVEL: MODERATE PAIN (4)

## 2021-03-10 NOTE — PROGRESS NOTES
"    Assessment & Plan     Abdominal pain, epigastric  Differentials for Constipation, Gastritis, doubt Gall bladder disorder. Advised bowel hygiene. Diagnostic orders as below, further plans will depend on results and clinical status.  - CBC with platelets and differential  - Comprehensive metabolic panel; Future  - TSH with free T4 reflex  - Tissue transglutaminase zeke IgA and IgG  - Enteric Bacteria and Virus Panel by [facility] Stool; Future  - Helicobacter pylori Antigen Stool; Future  - polyethylene glycol (MIRALAX) 17 GM/Dose powder; Take 17 g by mouth daily      20  minutes spent on the date of the encounter doing chart review, history and exam, documentation and further activities as noted above       BMI:   Estimated body mass index is 30.7 kg/m  as calculated from the following:    Height as of this encounter: 1.68 m (5' 6.14\").    Weight as of this encounter: 86.6 kg (191 lb).   Weight management plan: Discussed healthy diet and exercise guidelines    No follow-ups on file.    Suraj Park MD  [facility] DICK Queen is a 35 year old who presents for the following health issues  accompanied by her Self:    Abdominal Pain   This is a new problem. The current episode started in the past 7 days. The problem occurs daily. The problem has been waxing and waning. The pain is located in the epigastric region and periumbilical region. The quality of the pain is sharp. The pain is at a severity of 9/10. Associated symptoms include belching. Pertinent negatives include anorexia, diarrhea, constipation, dysuria and arthralgias. Nothing aggravates the symptoms. Nothing relieves the symptoms.   Answers for HPI/ROS submitted by the patient on 3/10/2021   Abdominal pain  Onset quality: undetermined  Episode duration: 8 hours  Radiates to: epigastric region, chest  Diagnostic workup: ultrasound          Review of Systems   Gastrointestinal: Positive for abdominal pain. Negative for " "anorexia, constipation and diarrhea.   Genitourinary: Negative for dysuria.   Musculoskeletal: Negative for arthralgias.      Constitutional, HEENT, cardiovascular, pulmonary, GI, , musculoskeletal, neuro, skin, endocrine and psych systems are negative, except as otherwise noted.      Objective    /70   Pulse 89   Temp 99.1  F (37.3  C) (Temporal)   Resp 16   Ht 1.68 m (5' 6.14\")   Wt 86.6 kg (191 lb)   LMP 02/24/2021 (Approximate)   SpO2 100%   BMI 30.70 kg/m    Body mass index is 30.7 kg/m .  Physical Exam   GENERAL: healthy, alert and no distress  EYES: Eyes grossly normal to inspection, PERRL and conjunctivae and sclerae normal  HENT: ear canals and TM's normal, nose and mouth without ulcers or lesions  NECK: no adenopathy, no asymmetry, masses, or scars and thyroid normal to palpation  RESP: lungs clear to auscultation - no rales, rhonchi or wheezes  CV: regular rate and rhythm, normal S1 S2, no S3 or S4, no murmur, click or rub, no peripheral edema and peripheral pulses strong  ABDOMEN: soft, nontender, no hepatosplenomegaly, no masses and bowel sounds normal  MS: no gross musculoskeletal defects noted, no edema                "

## 2021-03-12 DIAGNOSIS — R10.84 ABDOMINAL PAIN, GENERALIZED: Primary | ICD-10-CM

## 2021-03-12 LAB
TTG IGA SER-ACNC: 1 U/ML
TTG IGG SER-ACNC: <1 U/ML

## 2021-05-11 ENCOUNTER — OFFICE VISIT (OUTPATIENT)
Dept: OBGYN | Facility: CLINIC | Age: 36
End: 2021-05-11
Payer: COMMERCIAL

## 2021-05-11 VITALS
HEART RATE: 68 BPM | DIASTOLIC BLOOD PRESSURE: 83 MMHG | WEIGHT: 193.6 LBS | OXYGEN SATURATION: 100 % | BODY MASS INDEX: 31.11 KG/M2 | SYSTOLIC BLOOD PRESSURE: 123 MMHG

## 2021-05-11 DIAGNOSIS — Z30.46 ENCOUNTER FOR NEXPLANON REMOVAL: Primary | ICD-10-CM

## 2021-05-11 PROBLEM — Z97.5 NEXPLANON IN PLACE: Status: RESOLVED | Noted: 2021-01-05 | Resolved: 2021-05-11

## 2021-05-11 PROCEDURE — 11982 REMOVE DRUG IMPLANT DEVICE: CPT | Performed by: ADVANCED PRACTICE MIDWIFE

## 2021-05-11 NOTE — PROGRESS NOTES
Bernice Garcia is a 35 year old who presents for nexplanon removal.  Is unhappy with the bleeding she is doing.   Is not in need of other birth control  Is planning for her  to have a vasectomy.     Consent is obtained.   Allergies are confirmed.    The Nexplanon urbano was located in the left arm.  The distal and proximal ends of the urbano were located and marked with a marking pen and the area cleansed with betadine. Approximately 0.3 cc's of 1% lidocaine with epinephrine was injected into the area under the Nexplanon urbano and a small skin incision made using a #11 blade.  The Nexplanon was gently manipulated until the tip was at the incision. The urbano tip was grasped with a  Archana clamp and was gently removed from the incision.  Both urbano tips were inspected following removal and found to be completely intact.  The incision site was hemostatic and a steri-strip applied to the area along with a small pressure dressing.   (Z30.46) Encounter for Nexplanon removal  (primary encounter diagnosis)  Comment:   Plan:

## 2021-05-11 NOTE — PATIENT INSTRUCTIONS
..Leave the pressure dressing in place for 4-6 hours.  If you feel the dressing is too tight loosen it or remove it completely.  Leave the Band-Aid in place for 24 hours.  Change it if wet.   Leave the steri strip in place until it falls off by itself.  Call the clinic with fever, chills or bleeding from the site.

## 2021-05-25 DIAGNOSIS — F41.1 GENERALIZED ANXIETY DISORDER: ICD-10-CM

## 2021-05-26 RX ORDER — SERTRALINE HYDROCHLORIDE 100 MG/1
100 TABLET, FILM COATED ORAL DAILY
Qty: 90 TABLET | Refills: 0 | Status: SHIPPED | OUTPATIENT
Start: 2021-05-26 | End: 2021-07-19

## 2021-06-04 ENCOUNTER — MYC MEDICAL ADVICE (OUTPATIENT)
Dept: FAMILY MEDICINE | Facility: CLINIC | Age: 36
End: 2021-06-04

## 2021-06-04 ENCOUNTER — MYC REFILL (OUTPATIENT)
Dept: FAMILY MEDICINE | Facility: CLINIC | Age: 36
End: 2021-06-04

## 2021-06-04 DIAGNOSIS — F41.1 GENERALIZED ANXIETY DISORDER: ICD-10-CM

## 2021-06-04 RX ORDER — SERTRALINE HYDROCHLORIDE 100 MG/1
100 TABLET, FILM COATED ORAL DAILY
Qty: 90 TABLET | Refills: 0 | Status: CANCELLED | OUTPATIENT
Start: 2021-06-04

## 2021-06-04 NOTE — TELEPHONE ENCOUNTER
Routing refill request to provider for review/approval because:  Drug not active on patient's medication list  Kimmy BERMEON, RN

## 2021-07-19 ENCOUNTER — VIRTUAL VISIT (OUTPATIENT)
Dept: FAMILY MEDICINE | Facility: CLINIC | Age: 36
End: 2021-07-19
Payer: COMMERCIAL

## 2021-07-19 DIAGNOSIS — F41.1 GENERALIZED ANXIETY DISORDER: ICD-10-CM

## 2021-07-19 PROCEDURE — 96127 BRIEF EMOTIONAL/BEHAV ASSMT: CPT | Performed by: PHYSICIAN ASSISTANT

## 2021-07-19 PROCEDURE — 99214 OFFICE O/P EST MOD 30 MIN: CPT | Performed by: PHYSICIAN ASSISTANT

## 2021-07-19 RX ORDER — SERTRALINE HYDROCHLORIDE 100 MG/1
100 TABLET, FILM COATED ORAL DAILY
Qty: 90 TABLET | Refills: 0 | Status: SHIPPED | OUTPATIENT
Start: 2021-07-19 | End: 2021-09-16

## 2021-07-19 RX ORDER — BUPROPION HYDROCHLORIDE 150 MG/1
150 TABLET ORAL EVERY MORNING
Qty: 30 TABLET | Refills: 1 | Status: SHIPPED | OUTPATIENT
Start: 2021-07-19 | End: 2021-09-13

## 2021-07-19 RX ORDER — SERTRALINE HYDROCHLORIDE 100 MG/1
100 TABLET, FILM COATED ORAL DAILY
Qty: 90 TABLET | Refills: 0 | Status: CANCELLED | OUTPATIENT
Start: 2021-07-19

## 2021-07-19 ASSESSMENT — ANXIETY QUESTIONNAIRES
3. WORRYING TOO MUCH ABOUT DIFFERENT THINGS: NEARLY EVERY DAY
5. BEING SO RESTLESS THAT IT IS HARD TO SIT STILL: SEVERAL DAYS
7. FEELING AFRAID AS IF SOMETHING AWFUL MIGHT HAPPEN: NOT AT ALL
2. NOT BEING ABLE TO STOP OR CONTROL WORRYING: NOT AT ALL
6. BECOMING EASILY ANNOYED OR IRRITABLE: MORE THAN HALF THE DAYS
1. FEELING NERVOUS, ANXIOUS, OR ON EDGE: MORE THAN HALF THE DAYS
IF YOU CHECKED OFF ANY PROBLEMS ON THIS QUESTIONNAIRE, HOW DIFFICULT HAVE THESE PROBLEMS MADE IT FOR YOU TO DO YOUR WORK, TAKE CARE OF THINGS AT HOME, OR GET ALONG WITH OTHER PEOPLE: EXTREMELY DIFFICULT
GAD7 TOTAL SCORE: 10

## 2021-07-19 ASSESSMENT — PATIENT HEALTH QUESTIONNAIRE - PHQ9
5. POOR APPETITE OR OVEREATING: MORE THAN HALF THE DAYS
SUM OF ALL RESPONSES TO PHQ QUESTIONS 1-9: 10

## 2021-07-19 NOTE — PROGRESS NOTES
Bernice Garcia is a 36 year old who is being evaluated via a billable telephone visit.      What phone number would you like to be contacted at? [phone]  How would you like to obtain your AVS? MyChart    Assessment & Plan     Generalized anxiety disorder  Not controlled. Continue the 125mg of Zoloft daily and add wellbutrin 150mg daily. Advised on counseling. Referral placed. Follow up 4-6 weeks.   - MENTAL HEALTH REFERRAL  - Adult; Outpatient Treatment; Individual/Couples/Family/Group Therapy/Health Psychology[place] [facility] [place] [facility] [phone]; We will contact you to schedule the appointment or please call with any questions; Future  - sertraline (ZOLOFT) 100 MG tablet; Take 1 tablet (100 mg) by mouth daily  - buPROPion (WELLBUTRIN XL) 150 MG 24 hr tablet; Take 1 tablet (150 mg) by mouth every morning             Depression Screening Follow Up    PHQ 7/19/2021   PHQ-9 Total Score 10   Q9: Thoughts of better off dead/self-harm past 2 weeks Not at all     Last PHQ-9 7/19/2021   1.  Little interest or pleasure in doing things 0   2.  Feeling down, depressed, or hopeless 0   3.  Trouble falling or staying asleep, or sleeping too much 3   4.  Feeling tired or having little energy 2   5.  Poor appetite or overeating 0   6.  Feeling bad about yourself 0   7.  Trouble concentrating 3   8.  Moving slowly or restless 2   Q9: Thoughts of better off dead/self-harm past 2 weeks 0   PHQ-9 Total Score 10   Difficulty at work, home, or with people Somewhat difficult       Follow Up Actions Taken  Patient counseled, no additional follow up at this time.  Mental Health Referral placed         Return in about 4 weeks (around 8/16/2021) for Mood check, Telephone Visit.    CONSTANZA Garcia [facility] [place]    Subjective   Bernice Garcia is a 36 year old who presents for the following health issues     HPI     Depression and Anxiety Follow-Up    How are you doing with your depression  since your last visit? Improved a little    How are you doing with your anxiety since your last visit?  Worsened     Are you having other symptoms that might be associated with depression or anxiety? Yes:  attention and overwhelmed    Have you had a significant life event? No     Do you have any concerns with your use of alcohol or other drugs? No    Social History     Tobacco Use     Smoking status: Former Smoker     Packs/day: 0.00     Years: 0.00     Pack years: 0.00     Types: Cigarettes     Quit date: 2016     Years since quittin.5     Smokeless tobacco: Never Used   Substance Use Topics     Alcohol use: Yes     Drug use: No     PHQ 3/2/2018 2019 2020   PHQ-9 Total Score 15 11 7   Q9: Thoughts of better off dead/self-harm past 2 weeks Not at all Not at all Not at all     AMANDA-7 SCORE 3/27/2014 3/2/2018   Total Score 16 -   Total Score - 17     Last PHQ-9 2021   1.  Little interest or pleasure in doing things 0   2.  Feeling down, depressed, or hopeless 0   3.  Trouble falling or staying asleep, or sleeping too much 3   4.  Feeling tired or having little energy 2   5.  Poor appetite or overeating 0   6.  Feeling bad about yourself 0   7.  Trouble concentrating 3   8.  Moving slowly or restless 2   Q9: Thoughts of better off dead/self-harm past 2 weeks 0   PHQ-9 Total Score 10   Difficulty at work, home, or with people Somewhat difficult     AMANDA-7  2021   1. Feeling nervous, anxious, or on edge 2   2. Not being able to stop or control worrying 0   3. Worrying too much about different things 3   4. Trouble relaxing 2   5. Being so restless that it is hard to sit still 1   6. Becoming easily annoyed or irritable 2   7. Feeling afraid, as if something awful might happen 0   AMANDA-7 Total Score 10   If you checked any problems, how difficult have they made it for you to do your work, take care of things at home, or get along with other people? Extremely difficult       Suicide Assessment  "Five-step Evaluation and Treatment (SAFE-T)          How many servings of fruits and vegetables do you eat daily?  2-3    On average, how many sweetened beverages do you drink each day (Examples: soda, juice, sweet tea, etc.  Do NOT count diet or artificially sweetened beverages)?   0    How many days per week do you exercise enough to make your heart beat faster? 3 or less    How many minutes a day do you exercise enough to make your heart beat faster? 9 or less  How many days per week do you miss taking your medication? 2    What makes it hard for you to take your medications?  out of medication    She feels like she is having more anxiety and irritability. Having trouble sleeping and paying attention. Moods are up and down. Has a lot of guilt.   No suicidal thoughts, has in the past.   Has a lot of responsibilities. Has a lot \"going on\"  When it is loud and noisy \"I get panicky\"  Has not done any counseling or therapy.\" I don't have any time for myself\"    Review of Systems   Constitutional, HEENT, cardiovascular, pulmonary, gi and gu systems are negative, except as otherwise noted.      Objective           Vitals:  No vitals were obtained today due to virtual visit.    Physical Exam   healthy, alert and no distress  PSYCH: Alert and oriented times 3; coherent speech, normal   rate and volume, able to articulate logical thoughts, able   to abstract reason, no tangential thoughts, no hallucinations   or delusions  Her affect is normal  RESP: No cough, no audible wheezing, able to talk in full sentences  Remainder of exam unable to be completed due to telephone visits                Phone call duration: 10 minutes  "

## 2021-07-20 ASSESSMENT — ANXIETY QUESTIONNAIRES: GAD7 TOTAL SCORE: 10

## 2021-09-11 DIAGNOSIS — F41.1 GENERALIZED ANXIETY DISORDER: ICD-10-CM

## 2021-09-11 NOTE — LETTER
[facility]  [address]  [place] [address]  [phone]          September 14, 2021    Bernice aGrcia                                                                                                                     [address]  [place] [facility] [place] [address]            Dear Bernice,    Your provider has sent a  no refill of Wellbutrin. You are due for an appointment for further refills.  Please contact the clinic to schedule an appointment for further refills.               Sincerely,       Team Faisal Younger PA-C

## 2021-09-12 NOTE — TELEPHONE ENCOUNTER
Routing refill request to provider for review/approval because:  Drug not on the [facility] refill protocol   PHQ-9 score:    PHQ 7/19/2021   PHQ-9 Total Score 10   Q9: Thoughts of better off dead/self-harm past 2 weeks Not at all

## 2021-09-13 RX ORDER — BUPROPION HYDROCHLORIDE 150 MG/1
TABLET ORAL
Qty: 30 TABLET | Refills: 0 | Status: SHIPPED | OUTPATIENT
Start: 2021-09-13 | End: 2021-09-16

## 2021-09-14 NOTE — PROGRESS NOTES
"   SUBJECTIVE:   CC: Bernice Garcia is an 36 year old woman who presents for preventive health visit.       Patient has been advised of split billing requirements and indicates understanding: Yes  Healthy Habits:     Getting at least 3 servings of Calcium per day:  Yes    Bi-annual eye exam:  NO    Dental care twice a year:  Yes    Sleep apnea or symptoms of sleep apnea:  Daytime drowsiness and Excessive snoring    Diet:  Regular (no restrictions)    Frequency of exercise:  2-3 days/week    Duration of exercise:  30-45 minutes    Taking medications regularly:  No    Medication side effects:  None    PHQ-2 Total Score: 2    Additional concerns today:  Yes (heavyness in her chest, sharp shooting pain in her left breast- earlier this week. alittle bit this morning. )    Chest Pain      Onset: intermittent for the past few months    Description (location/character/radiation/duration): Left sided    Intensity:  Mild-moderate    Accompanying signs and symptoms:        Shortness of breath: no        Sweating: no        Nausea/vomitting: no        Palpitations: no        Other (fevers/chills/cough/heartburn/lightheadedness): no     History (similar episodes/previous evaluation): None    Precipitating or alleviating factors:       Worse with exertion: no        Worse with breathing: no        Related to eating: no        Better with burping: no     Therapies tried and outcome: None        Anxiety Follow-Up    How are you doing with your anxiety since your last visit? Improved- better     Are you having other symptoms that might be associated with anxiety? Yes:  still having \"moods\" thatt are adjusting. but the meds are helping. \"I have my moments\"     Have you had a significant life event? No     Are you feeling depressed? No    Do you have any concerns with your use of alcohol or other drugs? No    Social History     Tobacco Use     Smoking status: Former Smoker     Packs/day: 0.00     Years: 0.00     Pack years: " 0.00     Types: Cigarettes     Quit date: 2016     Years since quittin.7     Smokeless tobacco: Never Used   Substance Use Topics     Alcohol use: Yes     Comment: occ     Drug use: No     AMANDA-7 SCORE 3/2/2018 2021 9/15/2021   Total Score - - -   Total Score - - 17 (severe anxiety)   Total Score 17 10 17     PHQ 2020 2021 9/15/2021   PHQ-9 Total Score 7 10 10   Q9: Thoughts of better off dead/self-harm past 2 weeks Not at all Not at all Not at all     Last PHQ-9 9/15/2021   1.  Little interest or pleasure in doing things 1   2.  Feeling down, depressed, or hopeless 1   3.  Trouble falling or staying asleep, or sleeping too much 1   4.  Feeling tired or having little energy 3   5.  Poor appetite or overeating 0   6.  Feeling bad about yourself 1   7.  Trouble concentrating 3   8.  Moving slowly or restless 0   Q9: Thoughts of better off dead/self-harm past 2 weeks 0   PHQ-9 Total Score 10   Difficulty at work, home, or with people -     AMANDA-7  9/15/2021   1. Feeling nervous, anxious, or on edge 3   2. Not being able to stop or control worrying 2   3. Worrying too much about different things 2   4. Trouble relaxing 3   5. Being so restless that it is hard to sit still 3   6. Becoming easily annoyed or irritable 3   7. Feeling afraid, as if something awful might happen 1   AMANDA-7 Total Score 17   If you checked any problems, how difficult have they made it for you to do your work, take care of things at home, or get along with other people? -         Today's PHQ-2 Score:   PHQ-2 (  [employer]) 9/15/2021   Q1: Little interest or pleasure in doing things 1   Q2: Feeling down, depressed or hopeless 1   PHQ-2 Score 2   Q1: Little interest or pleasure in doing things Several days   Q2: Feeling down, depressed or hopeless Several days   PHQ-2 Score 2       Abuse: Current or Past (Physical, Sexual or Emotional) - No  Do you feel safe in your environment? Yes    Have you ever done Advance Care Planning?  (For example, a Health Directive, POLST, or a discussion with a medical provider or your loved ones about your wishes): No, advance care planning information given to patient to review.  Advanced care planning was discussed at today's visit.    Social History     Tobacco Use     Smoking status: Former Smoker     Packs/day: 0.00     Years: 0.00     Pack years: 0.00     Types: Cigarettes     Quit date: 2016     Years since quittin.7     Smokeless tobacco: Never Used   Substance Use Topics     Alcohol use: Yes     Comment: occ     If you drink alcohol do you typically have >3 drinks per day or >7 drinks per week? No    Alcohol Use 2021   Prescreen: >3 drinks/day or >7 drinks/week? -   Prescreen: >3 drinks/day or >7 drinks/week? No       Reviewed orders with patient.  Reviewed health maintenance and updated orders accordingly - Yes  BP Readings from Last 3 Encounters:   21 138/86   21 123/83   03/10/21 130/70    Wt Readings from Last 3 Encounters:   21 87.7 kg (193 lb 4.8 oz)   21 87.8 kg (193 lb 9.6 oz)   03/10/21 86.6 kg (191 lb)                  Patient Active Problem List   Diagnosis     Generalized anxiety disorder     Past Surgical History:   Procedure Laterality Date     EYE SURGERY  19     NO HISTORY OF SURGERY         Social History     Tobacco Use     Smoking status: Former Smoker     Packs/day: 0.00     Years: 0.00     Pack years: 0.00     Types: Cigarettes     Quit date: 2016     Years since quittin.7     Smokeless tobacco: Never Used   Substance Use Topics     Alcohol use: Yes     Comment: occ     Family History   Problem Relation Age of Onset     Alzheimer Disease Maternal Grandmother      Diabetes Maternal Grandfather      Alzheimer Disease Maternal Grandfather      Depression Sister      Anxiety Disorder Sister      Cancer Daughter      Diabetes Other         Aunt     Glaucoma No family hx of      Macular Degeneration No family hx of          Current  Outpatient Medications   Medication Sig Dispense Refill     Acetaminophen (TYLENOL) 325 MG CAPS Take 325-650 mg by mouth every 4 hours as needed       buPROPion (WELLBUTRIN XL) 150 MG 24 hr tablet TAKE 1 TABLET(150 MG) BY MOUTH EVERY MORNING 90 tablet 1     sertraline (ZOLOFT) 100 MG tablet Take 1 tablet (100 mg) by mouth daily 90 tablet 3     sertraline (ZOLOFT) 50 MG tablet Take 0.5 tablets (25 mg) by mouth daily In addition to 100 mg tablet 45 tablet 3     No Known Allergies  Recent Labs   Lab Test 09/16/21  1156 03/10/21  1506 03/10/21  1347 12/11/20  0835 09/11/19  1725 09/11/19  1725 05/04/19  0945 03/27/14  1906   *  --   --   --   --   --  153*  --    HDL 68  --   --   --   --   --  69  --    TRIG 143  --   --   --   --   --  57  --    ALT  --  22  --   --   --  21  --   --    CR  --  0.80  --   --   --  0.72  --   --    GFRESTIMATED  --  >90  --   --   --  >90  --   --    GFRESTBLACK  --  >90  --   --   --  >90  --   --    POTASSIUM  --  3.8  --   --   --  3.5  --   --    TSH  --   --  0.40 0.51   < > 1.29  --    < >    < > = values in this interval not displayed.        Breast Cancer Screening:  Any new diagnosis of family breast, ovarian, or bowel cancer? No    FHS-7: No flowsheet data found.  click delete button to remove this line now  Patient under 40 years of age: Routine Mammogram Screening not recommended.   Pertinent mammograms are reviewed under the imaging tab.    History of abnormal Pap smear: NO - age 30- 65 PAP every 3 years recommended  PAP / HPV Latest Ref Rng & Units 4/5/2017   PAP (Historical) - NIL   HPV16 NEG Negative   HPV18 NEG Negative   HRHPV NEG Negative     Reviewed and updated as needed this visit by clinical staff  Tobacco   Meds  Problems  Med Hx  Surg Hx  Fam Hx  Soc Hx        Reviewed and updated as needed this visit by Provider  Tobacco   Meds  Problems  Med Hx  Surg Hx  Fam Hx  Soc Hx         Review of Systems   Constitutional: Negative for chills and  "fever.   HENT: Negative for congestion, ear pain, hearing loss and sore throat.    Eyes: Positive for visual disturbance. Negative for pain.   Respiratory: Negative for cough and shortness of breath.    Cardiovascular: Positive for chest pain. Negative for palpitations and peripheral edema.   Gastrointestinal: Negative for abdominal pain, constipation, diarrhea, heartburn, hematochezia and nausea.   Breasts:  Negative for tenderness, breast mass and discharge.   Genitourinary: Negative for dysuria, frequency, genital sores, hematuria, pelvic pain, urgency, vaginal bleeding and vaginal discharge.   Musculoskeletal: Negative for arthralgias, joint swelling and myalgias.   Skin: Negative for rash.   Neurological: Negative for dizziness, weakness, headaches and paresthesias.   Psychiatric/Behavioral: Positive for mood changes. The patient is not nervous/anxious.           OBJECTIVE:   /86   Pulse 88   Temp 97.6  F (36.4  C) (Temporal)   Ht 1.672 m (5' 5.83\")   Wt 87.7 kg (193 lb 4.8 oz)   LMP 09/02/2021 (Exact Date)   SpO2 100%   BMI 31.36 kg/m    Physical Exam  GENERAL: healthy, alert and no distress  EYES: Eyes grossly normal to inspection, PERRL and conjunctivae and sclerae normal  HENT: ear canals and TM's normal, nose and mouth without ulcers or lesions  NECK: no adenopathy, no asymmetry, masses, or scars and thyroid normal to palpation  RESP: lungs clear to auscultation - no rales, rhonchi or wheezes  BREAST: normal without masses, tenderness or nipple discharge and no palpable axillary masses or adenopathy  CV: regular rate and rhythm, normal S1 S2, no S3 or S4, no murmur, click or rub, no peripheral edema and peripheral pulses strong  ABDOMEN: soft, nontender, no hepatosplenomegaly, no masses and bowel sounds normal  MS: no gross musculoskeletal defects noted, no edema  SKIN: no suspicious lesions or rashes  NEURO: Normal strength and tone, mentation intact and speech normal  PSYCH: mentation " "appears normal, affect normal/bright        ASSESSMENT/PLAN:   (Z00.00) Routine general medical examination at a health care facility  (primary encounter diagnosis)  Comment: See Order  Plan: REVIEW OF HEALTH MAINTENANCE PROTOCOL ORDERS            (R07.89) Atypical chest pain  Comment: See order  Plan: CT Coronary Calcium Scan, Leadless EKG Monitor         3 to 7 Days       (F41.1) Generalized anxiety disorder  Comment: Stable, continue present management  Plan: sertraline (ZOLOFT) 50 MG tablet, sertraline         (ZOLOFT) 100 MG tablet, buPROPion (WELLBUTRIN         XL) 150 MG 24 hr tablet      (Z11.59) Need for hepatitis C screening test  Comment: See Order  Plan: Hepatitis C Screen Reflex to HCV RNA Quant and         Genotype        (Z23) Need for prophylactic vaccination and inoculation against influenza  Comment: See Order  Plan: INFLUENZA VACCINE IM > 6 MONTHS VALENT IIV4         (AFLURIA/FLUZONE)       (Z13.6) CARDIOVASCULAR SCREENING; LDL GOAL LESS THAN 100  Comment: See order  Plan: Lipid panel reflex to direct LDL Fasting        Patient has been advised of split billing requirements and indicates understanding: Yes  COUNSELING:  Reviewed preventive health counseling, as reflected in patient instructions       Regular exercise       Healthy diet/nutrition       Vision screening       Hearing screening       Osteoporosis prevention/bone health       Colon cancer screening       Consider Hep C screening for all patients one time for ages 18-79 years       Advance Care Planning    Estimated body mass index is 31.36 kg/m  as calculated from the following:    Height as of this encounter: 1.672 m (5' 5.83\").    Weight as of this encounter: 87.7 kg (193 lb 4.8 oz).        She reports that she quit smoking about 4 years ago. Her smoking use included cigarettes. She smoked 0.00 packs per day for 0.00 years. She has never used smokeless tobacco.      Counseling Resources:  ATP IV Guidelines  Pooled Cohorts Equation " Calculator  Breast Cancer Risk Calculator  BRCA-Related Cancer Risk Assessment: FHS-7 Tool  FRAX Risk Assessment  ICSI Preventive Guidelines  Dietary Guidelines for Americans, 2010  USDA's MyPlate  ASA Prophylaxis  Lung CA Screening    Suraj Park MD  [facility]  Answers for HPI/ROS submitted by the patient on 9/15/2021  If you checked off any problems, how difficult have these problems made it for you to do your work, take care of things at home, or get along with other people?: Somewhat difficult  PHQ9 TOTAL SCORE: 10  AMANDA 7 TOTAL SCORE: 17

## 2021-09-15 ASSESSMENT — ANXIETY QUESTIONNAIRES
5. BEING SO RESTLESS THAT IT IS HARD TO SIT STILL: NEARLY EVERY DAY
8. IF YOU CHECKED OFF ANY PROBLEMS, HOW DIFFICULT HAVE THESE MADE IT FOR YOU TO DO YOUR WORK, TAKE CARE OF THINGS AT HOME, OR GET ALONG WITH OTHER PEOPLE?: SOMEWHAT DIFFICULT
7. FEELING AFRAID AS IF SOMETHING AWFUL MIGHT HAPPEN: SEVERAL DAYS
GAD7 TOTAL SCORE: 17
GAD7 TOTAL SCORE: 17
1. FEELING NERVOUS, ANXIOUS, OR ON EDGE: NEARLY EVERY DAY
3. WORRYING TOO MUCH ABOUT DIFFERENT THINGS: MORE THAN HALF THE DAYS
7. FEELING AFRAID AS IF SOMETHING AWFUL MIGHT HAPPEN: SEVERAL DAYS
4. TROUBLE RELAXING: NEARLY EVERY DAY
GAD7 TOTAL SCORE: 17
6. BECOMING EASILY ANNOYED OR IRRITABLE: NEARLY EVERY DAY
2. NOT BEING ABLE TO STOP OR CONTROL WORRYING: MORE THAN HALF THE DAYS

## 2021-09-15 ASSESSMENT — ENCOUNTER SYMPTOMS
SHORTNESS OF BREATH: 0
EYE PAIN: 0
FREQUENCY: 0
COUGH: 0
BREAST MASS: 0
NAUSEA: 0
HEARTBURN: 0
HEADACHES: 0
CHILLS: 0
SORE THROAT: 0
MYALGIAS: 0
PARESTHESIAS: 0
DIARRHEA: 0
ARTHRALGIAS: 0
CONSTIPATION: 0
JOINT SWELLING: 0
NERVOUS/ANXIOUS: 0
HEMATURIA: 0
HEMATOCHEZIA: 0
PALPITATIONS: 0
FEVER: 0
ABDOMINAL PAIN: 0
DIZZINESS: 0
WEAKNESS: 0
DYSURIA: 0

## 2021-09-15 ASSESSMENT — PATIENT HEALTH QUESTIONNAIRE - PHQ9
SUM OF ALL RESPONSES TO PHQ QUESTIONS 1-9: 10
SUM OF ALL RESPONSES TO PHQ QUESTIONS 1-9: 10
10. IF YOU CHECKED OFF ANY PROBLEMS, HOW DIFFICULT HAVE THESE PROBLEMS MADE IT FOR YOU TO DO YOUR WORK, TAKE CARE OF THINGS AT HOME, OR GET ALONG WITH OTHER PEOPLE: SOMEWHAT DIFFICULT

## 2021-09-16 ENCOUNTER — OFFICE VISIT (OUTPATIENT)
Dept: FAMILY MEDICINE | Facility: CLINIC | Age: 36
End: 2021-09-16
Payer: COMMERCIAL

## 2021-09-16 VITALS
BODY MASS INDEX: 31.07 KG/M2 | HEART RATE: 88 BPM | WEIGHT: 193.3 LBS | TEMPERATURE: 97.6 F | HEIGHT: 66 IN | OXYGEN SATURATION: 100 % | DIASTOLIC BLOOD PRESSURE: 86 MMHG | SYSTOLIC BLOOD PRESSURE: 138 MMHG

## 2021-09-16 DIAGNOSIS — F41.1 GENERALIZED ANXIETY DISORDER: ICD-10-CM

## 2021-09-16 DIAGNOSIS — Z00.00 ROUTINE GENERAL MEDICAL EXAMINATION AT A HEALTH CARE FACILITY: Primary | ICD-10-CM

## 2021-09-16 DIAGNOSIS — R07.89 ATYPICAL CHEST PAIN: ICD-10-CM

## 2021-09-16 DIAGNOSIS — Z13.6 CARDIOVASCULAR SCREENING; LDL GOAL LESS THAN 100: ICD-10-CM

## 2021-09-16 DIAGNOSIS — Z23 NEED FOR PROPHYLACTIC VACCINATION AND INOCULATION AGAINST INFLUENZA: ICD-10-CM

## 2021-09-16 DIAGNOSIS — Z11.59 NEED FOR HEPATITIS C SCREENING TEST: ICD-10-CM

## 2021-09-16 LAB
CHOLEST SERPL-MCNC: 279 MG/DL
FASTING STATUS PATIENT QL REPORTED: YES
HDLC SERPL-MCNC: 68 MG/DL
LDLC SERPL CALC-MCNC: 182 MG/DL
NONHDLC SERPL-MCNC: 211 MG/DL
TRIGL SERPL-MCNC: 143 MG/DL

## 2021-09-16 PROCEDURE — 90686 IIV4 VACC NO PRSV 0.5 ML IM: CPT | Performed by: FAMILY MEDICINE

## 2021-09-16 PROCEDURE — 90471 IMMUNIZATION ADMIN: CPT | Performed by: FAMILY MEDICINE

## 2021-09-16 PROCEDURE — 80061 LIPID PANEL: CPT | Performed by: FAMILY MEDICINE

## 2021-09-16 PROCEDURE — 99395 PREV VISIT EST AGE 18-39: CPT | Mod: 25 | Performed by: FAMILY MEDICINE

## 2021-09-16 PROCEDURE — 86803 HEPATITIS C AB TEST: CPT | Performed by: FAMILY MEDICINE

## 2021-09-16 PROCEDURE — 36415 COLL VENOUS BLD VENIPUNCTURE: CPT | Performed by: FAMILY MEDICINE

## 2021-09-16 PROCEDURE — 99214 OFFICE O/P EST MOD 30 MIN: CPT | Mod: 25 | Performed by: FAMILY MEDICINE

## 2021-09-16 RX ORDER — SERTRALINE HYDROCHLORIDE 100 MG/1
100 TABLET, FILM COATED ORAL DAILY
Qty: 90 TABLET | Refills: 3 | Status: SHIPPED | OUTPATIENT
Start: 2021-09-16 | End: 2022-01-27

## 2021-09-16 RX ORDER — BUPROPION HYDROCHLORIDE 150 MG/1
TABLET ORAL
Qty: 90 TABLET | Refills: 1 | Status: SHIPPED | OUTPATIENT
Start: 2021-09-16 | End: 2022-03-31

## 2021-09-16 ASSESSMENT — ENCOUNTER SYMPTOMS
CONSTIPATION: 0
FREQUENCY: 0
WEAKNESS: 0
NERVOUS/ANXIOUS: 0
EYE PAIN: 0
HEARTBURN: 0
ABDOMINAL PAIN: 0
HEMATURIA: 0
DYSURIA: 0
COUGH: 0
PALPITATIONS: 0
MYALGIAS: 0
HEMATOCHEZIA: 0
ARTHRALGIAS: 0
DIZZINESS: 0
SORE THROAT: 0
CHILLS: 0
NAUSEA: 0
JOINT SWELLING: 0
FEVER: 0
SHORTNESS OF BREATH: 0
HEADACHES: 0
BREAST MASS: 0
DIARRHEA: 0
PARESTHESIAS: 0

## 2021-09-16 ASSESSMENT — PAIN SCALES - GENERAL: PAINLEVEL: NO PAIN (1)

## 2021-09-16 ASSESSMENT — PATIENT HEALTH QUESTIONNAIRE - PHQ9: SUM OF ALL RESPONSES TO PHQ QUESTIONS 1-9: 10

## 2021-09-16 ASSESSMENT — MIFFLIN-ST. JEOR: SCORE: 1585.8

## 2021-09-16 ASSESSMENT — ANXIETY QUESTIONNAIRES: GAD7 TOTAL SCORE: 17

## 2021-09-16 NOTE — PATIENT INSTRUCTIONS
Patient Education     Prevention Guidelines, Women Ages 40 to 49  Screening tests and vaccines are an important part of managing your health. A screening test is done to find diseases in people who don't have any symptoms. The goal is to find a disease early so lifestyle changes and checkups can reduce the risk of disease. Or the goal may be to detect it early to treat it most effectively. Screening tests are not used to diagnose a disease. But they are used to see if more testing is needed. Health counseling is important, too. Below are guidelines for these, for women ages 40 to 49. Talk with your healthcare provider to make sure you re up-to-date on what you need.  Screening Who needs it How often   Type 2 diabetes or prediabetes All women beginning at age 45 and women without symptoms at any age who are overweight or obese and have 1 or more additional risk factors for diabetes At least every 3 years1   Type 2 diabetes or prediabetes All women diagnosed with gestational diabetes Lifelong testing every 3 years   Type 2 diabetes All women with prediabetes Every year   Alcohol misuse All women in this age group At routine exams   Blood pressure All women in this age group Yearly checkup if your blood pressure is normal  Normal blood pressure is less than 120/80 mm Hg  If your blood pressure reading is higher than normal, follow the advice of your healthcare provider   Breast cancer All women at average risk in this age group Screening with a mammogram can start at age 40.2 Talk with your healthcare provider to help you decide when to start screening. At age 45 start yearly mammograms.3   Cervical cancer All women in this age group, except women who have had a complete hysterectomy Pap test every 3 years or Pap test plus human papilloma virus (HPV) test every 5 years   Colorectal cancer Women age 45 years and older at average risk Multiple tests are available and are used at different times. Possible tests  include:    Flexible sigmoidoscopy every 5 years, or    Colonoscopy every 10 years, or    CT colonography (virtual colonoscopy) every 5 years, or    Yearly fecal occult blood test, or    Yearly fecal immunochemical test every year, or    Stool DNA test, every 3 years  If you choose a test other than a colonoscopy and have an abnormal test result, you will need to follow-up with a colonoscopy. Screening advice varies among expert groups. Talk with your healthcare provider about which tests are best for you.  Some people should be screened using a different schedule because of their personal or family health history. Talk with your healthcare provider about your health history.   Chlamydia Women at increased risk for infection At routine exams if you're at risk or have symptoms   Depression All women in this age group At routine exams   Gonorrhea Sexually active women at increased risk for infection At routine exams   Hepatitis C Anyone at increased risk; 1 time for those born between 1945 and 1965 At routine exams   High cholesterol or triglycerides All women ages 45 and older who are at risk for coronary artery disease; younger women, talk with your healthcare provider At least every 5 years   HIV All women At routine exams. Those with risk factors for HIV should be tested at least annually.   Obesity All women in this age group At routine exams   Syphilis Women at increased risk for infection-talk with your healthcare provider At routine exams   Tuberculosis Women at increased risk for infection-talk with your healthcare provider Ask your healthcare provider   Vision All women in this age group Complete exam at age 40 and eye exams every 2 to 4 years. If you have a chronic disease, ask your healthcare provider how often you should have your eyes examined.4   Vaccine Who needs it How often   Chickenpox (varicella) All women in this age group who have no record of this infection or vaccine 2 doses; the second dose  should be given at least 4 weeks after the first dose   Hepatitis A Women at increased risk for infection-talk with your healthcare provider 2 doses given 6 months apart   Hepatitis B Women at increased risk for infection-talk with your healthcare provider 3 doses over 6 months; second dose should be given 1 month after the first dose; the third dose should be given at least 2 months after the second dose and at least 4 months after the first dose   Haemophilus influenzae Type B (HIB) Women at increased risk 1 to 3 doses   Influenza (flu) All women in this age group Once a year   Measles, mumps, rubella (MMR) All women in this age group who have no record of these infections or vaccines 1 or 2 doses   Meningococcal Women at increased risk for infection-talk with your healthcare provider 1 or more doses   Pneumococcal conjugate vaccine (PCV13) and pneumococcal polysaccharide vaccine (PPSV23) Women at increased risk for infection-talk with your healthcare provider 1 or 2 doses   Tetanus/diphtheria/pertussis (Td/Tdap) booster All women in this age group A 1-time dose of Tdap instead of a Td booster after age 18, then Td every 10 years   Counseling Who needs it How often   BRCA gene mutation testing for breast and ovarian cancer susceptibility Women with increased risk for having gene mutation When your risk is known   Breast cancer and chemoprevention Women at high risk for breast cancer When your risk is known   Diet and exercise Women who are overweight or obese When diagnosed, and then at routine exams   Domestic violence Women at the age in which they are able to have children At routine exams   Sexually transmitted infection prevention Women at increased risk for infection-talk with your healthcare provider At routine exams   Use of tobacco and the health effects it can cause All women in this age group Every exam   1 American Diabetes Association  2 American College of Obstetricians and Gynecologists   3 American  Cancer Society  4 American Academy of Ophthalmology  [employer] last reviewed this educational content on 11/1/2017 2000-2021 [employer]. All rights reserved. This information is not intended as a substitute for professional medical care. Always follow your healthcare professional's instructions.

## 2021-09-17 LAB — HCV AB SERPL QL IA: NONREACTIVE

## 2021-09-28 ENCOUNTER — ANCILLARY PROCEDURE (OUTPATIENT)
Dept: CARDIOLOGY | Facility: CLINIC | Age: 36
End: 2021-09-28
Attending: FAMILY MEDICINE
Payer: COMMERCIAL

## 2021-09-28 DIAGNOSIS — R07.89 ATYPICAL CHEST PAIN: ICD-10-CM

## 2021-09-28 PROCEDURE — 93241 XTRNL ECG REC>48HR<7D: CPT | Performed by: INTERNAL MEDICINE

## 2021-09-28 NOTE — PATIENT INSTRUCTIONS
Patient has been prescribed a ZioPatch holter for 7 days.  Patient was instructed regarding the indication, function, care and prompt return of the ZioPatch holter monitor. The monitor, with S/N [id number],  was placed on the patient with instructions regarding care of the skin, electrodes, and monitor, as well as documentation in the patient diary. Patient demonstrated understanding of this information and agreed to call [employer] with further questions or concerns.

## 2022-01-07 ENCOUNTER — E-VISIT (OUTPATIENT)
Dept: FAMILY MEDICINE | Facility: CLINIC | Age: 37
End: 2022-01-07
Payer: COMMERCIAL

## 2022-01-07 DIAGNOSIS — Z20.822 SUSPECTED COVID-19 VIRUS INFECTION: Primary | ICD-10-CM

## 2022-01-07 PROCEDURE — 99421 OL DIG E/M SVC 5-10 MIN: CPT | Performed by: FAMILY MEDICINE

## 2022-01-08 NOTE — PATIENT INSTRUCTIONS
Bernice Garcia,      Based on your responses, you may have coronavirus (COVID-19).     Will I be tested for COVID-19?  We would like to test you for COVID-19 virus. I have placed orders for this test.     To schedule: go to your [employer] home page and scroll down to the section that says  You have an appointment that needs to be scheduled  and click the large green button that says  Schedule Now  and follow the steps to find the next available openings.    If you are unable to complete these [employer] scheduling steps, please call [phone] to schedule your testing.     Return to work/school/ guidance: These guidelines may be changing soon.   Please let your workplace manager and staffing office know when your isolation ends.       If you receive a positive COVID-19 test result, follow the guidance of the those who are giving you the results. Usually the return to work is 10 days from symptom onset or positive test date, (or in some cases 20 days if you are immunocompromised). If your symptoms started after your positive test, the 10 days should start when your symptoms started.   o If you work at [employer], you must also be cleared by Employee Occupational Health and Safety to return to work.      If you receive a negative COVID-19 test result and did not have a high risk exposure to someone with a known positive COVID-19 test, you can return to work once you're free of fever for 24 hours without fever-reducing medication and your symptoms are improving or resolved.    If you receive a negative COVID-19 test and had a high-risk exposure to someone who has tested positive for COVID-19 then you can return to work 14 days after your last contact with the positive individual. Follow quarantine guidance given by your doctor or public health officials.     Sign up for [employer]:  We know it's scary to hear that you might have COVID-19. We want to track your symptoms to make sure you're okay over  the next 2 weeks. Please look for an email from [employer]--this is a free, online program that we'll use to keep in touch. To sign up, follow the link in the email you will receive. Learn more at [email or web address]    How can I take care of myself?  Over the counter medications may help with your symptoms like congestion, cough, chills, or fever.    There are not many effective prescription treatments for early COVID-19. Hydroxychloroquine, ivermectin, and azithromycin are not effective or recommended for COVID-19.    If your symptoms started in the last 10 days, you may be able to receive a treatment with monoclonal antibodies. This treatment can lower your risk of severe illness and going to the hospital. It is given through an IV or under your skin (subcutaneous) and must be given at an infusion center. You must be 12 or older, weight at least 88 pounds, and have a positive COVID-19 test.     If you would like to sign up to be considered to receive the monoclonal antibody medicine, please complete a participation form through the [facility] here: MNRAP ([email or web address]). You may also call the [facility] COVID-19 Public Hotline at 1-430.260.7421 (open Mon-Fri: 9am-7pm and Sat: 10am-6pm).     Not all people who are eligible will receive the medicine, since supply is limited. You will be contacted in the next 1 to 2 business days only if you are selected. If you do not receive a call, you have not been selected to receive the medicine. If you have any questions about this medication, please contact your primary care provider. For more information, see [email or web address]      Get lots of rest. Drink extra fluids (unless a doctor has told you not to)    Take Tylenol (acetaminophen) or ibuprofen for fever or pain. If you have liver or kidney problems, ask your family doctor if it's okay to take  Tylenol o ibuprofen    Take over the counter medications for your symptoms, as directed by your doctor. You may also talk to your pharmacist.      If you have other health problems (like cancer, heart failure, an organ transplant or severe kidney disease): Call your specialty clinic if you don't feel better in the next 2 days.    Know when to call 911. Emergency warning signs include:  o Trouble breathing or shortness of breath  o Pain or pressure in the chest that doesn't go away  o Feeling confused like you haven't felt before, or not being able to wake up  o Bluish-colored lips or face    Where can I get more information?    [facility] - About COVID-19: [email or web address]     CDC - What to Do If You're Sick:     www.cdc.gov/coronavirus/2019-ncov/about/steps-when-sick.html    CDC - Ending Home Isolation:  https://www.cdc.gov/coronavirus/2019-ncov/your-health/quarantine-isolation.html    CDC - Caring for Someone:  www.cdc.gov/coronavirus/2019-ncov/if-you-are-sick/care-for-someone.html    [facility] clinical trials (COVID-19 research studies): [email or web address]-clinical-trials    Below are the COVID-19 hotlines at the [facility] ([facility]). Interpreters are available.  o For health questions: Call [phone] or [phone] (7 a.m. to 7 p.m.)  o For questions about schools and childcare: Call [phone] or [phone] (7 a.m. to 7 p.m.)

## 2022-02-19 ENCOUNTER — E-VISIT (OUTPATIENT)
Dept: FAMILY MEDICINE | Facility: CLINIC | Age: 37
End: 2022-02-19
Payer: COMMERCIAL

## 2022-02-19 DIAGNOSIS — F41.1 GENERALIZED ANXIETY DISORDER: ICD-10-CM

## 2022-02-19 PROCEDURE — 99421 OL DIG E/M SVC 5-10 MIN: CPT | Performed by: PHYSICIAN ASSISTANT

## 2022-02-19 ASSESSMENT — ANXIETY QUESTIONNAIRES
4. TROUBLE RELAXING: NEARLY EVERY DAY
GAD7 TOTAL SCORE: 15
3. WORRYING TOO MUCH ABOUT DIFFERENT THINGS: MORE THAN HALF THE DAYS
5. BEING SO RESTLESS THAT IT IS HARD TO SIT STILL: NEARLY EVERY DAY
6. BECOMING EASILY ANNOYED OR IRRITABLE: MORE THAN HALF THE DAYS
7. FEELING AFRAID AS IF SOMETHING AWFUL MIGHT HAPPEN: SEVERAL DAYS
GAD7 TOTAL SCORE: 15
7. FEELING AFRAID AS IF SOMETHING AWFUL MIGHT HAPPEN: SEVERAL DAYS
GAD7 TOTAL SCORE: 15
2. NOT BEING ABLE TO STOP OR CONTROL WORRYING: MORE THAN HALF THE DAYS
8. IF YOU CHECKED OFF ANY PROBLEMS, HOW DIFFICULT HAVE THESE MADE IT FOR YOU TO DO YOUR WORK, TAKE CARE OF THINGS AT HOME, OR GET ALONG WITH OTHER PEOPLE?: VERY DIFFICULT
1. FEELING NERVOUS, ANXIOUS, OR ON EDGE: MORE THAN HALF THE DAYS

## 2022-02-19 ASSESSMENT — PATIENT HEALTH QUESTIONNAIRE - PHQ9
10. IF YOU CHECKED OFF ANY PROBLEMS, HOW DIFFICULT HAVE THESE PROBLEMS MADE IT FOR YOU TO DO YOUR WORK, TAKE CARE OF THINGS AT HOME, OR GET ALONG WITH OTHER PEOPLE: SOMEWHAT DIFFICULT
SUM OF ALL RESPONSES TO PHQ QUESTIONS 1-9: 5
SUM OF ALL RESPONSES TO PHQ QUESTIONS 1-9: 5

## 2022-02-20 ASSESSMENT — PATIENT HEALTH QUESTIONNAIRE - PHQ9: SUM OF ALL RESPONSES TO PHQ QUESTIONS 1-9: 5

## 2022-02-20 ASSESSMENT — ANXIETY QUESTIONNAIRES: GAD7 TOTAL SCORE: 15

## 2022-02-21 RX ORDER — FLUOXETINE 10 MG/1
10 CAPSULE ORAL DAILY
Qty: 30 CAPSULE | Refills: 1 | Status: SHIPPED | OUTPATIENT
Start: 2022-02-21 | End: 2022-03-31

## 2022-03-01 ENCOUNTER — RX ONLY (RX ONLY)
Age: 37
End: 2022-03-01

## 2022-03-01 ENCOUNTER — APPOINTMENT (OUTPATIENT)
Dept: URBAN - METROPOLITAN AREA CLINIC 254 | Age: 37
Setting detail: DERMATOLOGY
End: 2022-03-02

## 2022-03-01 VITALS — RESPIRATION RATE: 14 BRPM | WEIGHT: 189 LBS | HEIGHT: 65 IN

## 2022-03-01 DIAGNOSIS — L70.8 OTHER ACNE: ICD-10-CM

## 2022-03-01 DIAGNOSIS — L74.51 PRIMARY FOCAL HYPERHIDROSIS: ICD-10-CM

## 2022-03-01 PROBLEM — L74.511 PRIMARY FOCAL HYPERHIDROSIS, FACE: Status: ACTIVE | Noted: 2022-03-01

## 2022-03-01 PROBLEM — L74.519 PRIMARY FOCAL HYPERHIDROSIS, UNSPECIFIED: Status: ACTIVE | Noted: 2022-03-01

## 2022-03-01 PROCEDURE — OTHER TREATMENT REGIMEN: OTHER

## 2022-03-01 PROCEDURE — OTHER MIPS QUALITY: OTHER

## 2022-03-01 PROCEDURE — OTHER COUNSELING: OTHER

## 2022-03-01 PROCEDURE — 99204 OFFICE O/P NEW MOD 45 MIN: CPT

## 2022-03-01 PROCEDURE — OTHER PRESCRIPTION: OTHER

## 2022-03-01 RX ORDER — SPIRONOLACTONE 100 MG/1
TABLET, FILM COATED ORAL
Qty: 90 | Refills: 0 | Status: ERX | COMMUNITY
Start: 2022-03-01

## 2022-03-01 RX ORDER — SPIRONOLACTONE 50 MG/1
TABLET, FILM COATED ORAL
Qty: 60 | Refills: 1 | Status: CANCELLED
Stop reason: CLARIF

## 2022-03-01 RX ORDER — GLYCOPYRROLATE 1 MG/1
TABLET ORAL QD
Qty: 60 | Refills: 1 | Status: ERX | COMMUNITY
Start: 2022-03-01

## 2022-03-01 ASSESSMENT — LOCATION SIMPLE DESCRIPTION DERM
LOCATION SIMPLE: LEFT CHEEK
LOCATION SIMPLE: RIGHT LIP
LOCATION SIMPLE: LEFT UPPER ARM
LOCATION SIMPLE: RIGHT CHEEK
LOCATION SIMPLE: RIGHT UPPER ARM
LOCATION SIMPLE: LEFT UPPER BACK
LOCATION SIMPLE: LEFT BREAST
LOCATION SIMPLE: ABDOMEN
LOCATION SIMPLE: NECK
LOCATION SIMPLE: RIGHT UPPER BACK

## 2022-03-01 ASSESSMENT — LOCATION DETAILED DESCRIPTION DERM
LOCATION DETAILED: RIGHT CENTRAL BUCCAL CHEEK
LOCATION DETAILED: LEFT MID-UPPER BACK
LOCATION DETAILED: LEFT CENTRAL MALAR CHEEK
LOCATION DETAILED: RIGHT LOWER CUTANEOUS LIP
LOCATION DETAILED: RIGHT ANTERIOR MEDIAL PROXIMAL UPPER ARM
LOCATION DETAILED: LEFT INFRAMAMMARY CREASE (INNER QUADRANT)
LOCATION DETAILED: RIGHT MID-UPPER BACK
LOCATION DETAILED: RIGHT RIB CAGE
LOCATION DETAILED: RIGHT CENTRAL MALAR CHEEK
LOCATION DETAILED: RIGHT CENTRAL LATERAL NECK
LOCATION DETAILED: LEFT ANTERIOR MEDIAL PROXIMAL UPPER ARM
LOCATION DETAILED: LEFT LATERAL BUCCAL CHEEK

## 2022-03-01 ASSESSMENT — LOCATION ZONE DERM
LOCATION ZONE: TRUNK
LOCATION ZONE: ARM
LOCATION ZONE: LIP
LOCATION ZONE: FACE
LOCATION ZONE: NECK

## 2022-03-01 NOTE — PROCEDURE: COUNSELING
Detail Level: Detailed
Azithromycin Counseling:  I discussed with the patient the risks of azithromycin including but not limited to GI upset, allergic reaction, drug rash, diarrhea, and yeast infections.
Winlevi Pregnancy And Lactation Text: This medication is considered safe during pregnancy and breastfeeding.
High Dose Vitamin A Counseling: Side effects reviewed, pt to contact office should one occur.
Topical Retinoid counseling:  Patient advised to apply a pea-sized amount only at bedtime and wait 30 minutes after washing their face before applying.  If too drying, patient may add a non-comedogenic moisturizer. The patient verbalized understanding of the proper use and possible adverse effects of retinoids.  All of the patient's questions and concerns were addressed.
Isotretinoin Counseling: Patient should get monthly blood tests, not donate blood, not drive at night if vision affected, not share medication, and not undergo elective surgery for 6 months after tx completed. Side effects reviewed, pt to contact office should one occur.
Spironolactone Counseling: Patient advised regarding risks of diarrhea, abdominal pain, hyperkalemia, birth defects (for female patients), liver toxicity and renal toxicity. The patient may need blood work to monitor liver and kidney function and potassium levels while on therapy. The patient verbalized understanding of the proper use and possible adverse effects of spironolactone.  All of the patient's questions and concerns were addressed.
Sarecycline Counseling: Patient advised regarding possible photosensitivity and discoloration of the teeth, skin, lips, tongue and gums.  Patient instructed to avoid sunlight, if possible.  When exposed to sunlight, patients should wear protective clothing, sunglasses, and sunscreen.  The patient was instructed to call the office immediately if the following severe adverse effects occur:  hearing changes, easy bruising/bleeding, severe headache, or vision changes.  The patient verbalized understanding of the proper use and possible adverse effects of sarecycline.  All of the patient's questions and concerns were addressed.
Azithromycin Pregnancy And Lactation Text: This medication is considered safe during pregnancy and is also secreted in breast milk.
Bactrim Counseling:  I discussed with the patient the risks of sulfa antibiotics including but not limited to GI upset, allergic reaction, drug rash, diarrhea, dizziness, photosensitivity, and yeast infections.  Rarely, more serious reactions can occur including but not limited to aplastic anemia, agranulocytosis, methemoglobinemia, blood dyscrasias, liver or kidney failure, lung infiltrates or desquamative/blistering drug rashes.
Patient Specific Counseling (Will Not Stick From Patient To Patient): - discussed Differin .1 gel
Spironolactone Pregnancy And Lactation Text: This medication can cause feminization of the male fetus and should be avoided during pregnancy. The active metabolite is also found in breast milk.
Winlevi Counseling:  I discussed with the patient the risks of topical clascoterone including but not limited to erythema, scaling, itching, and stinging. Patient voiced their understanding.
Erythromycin Pregnancy And Lactation Text: This medication is Pregnancy Category B and is considered safe during pregnancy. It is also excreted in breast milk.
Tetracycline Counseling: Patient counseled regarding possible photosensitivity and increased risk for sunburn.  Patient instructed to avoid sunlight, if possible.  When exposed to sunlight, patients should wear protective clothing, sunglasses, and sunscreen.  The patient was instructed to call the office immediately if the following severe adverse effects occur:  hearing changes, easy bruising/bleeding, severe headache, or vision changes.  The patient verbalized understanding of the proper use and possible adverse effects of tetracycline.  All of the patient's questions and concerns were addressed. Patient understands to avoid pregnancy while on therapy due to potential birth defects.
Azelaic Acid Pregnancy And Lactation Text: This medication is considered safe during pregnancy and breast feeding.
High Dose Vitamin A Pregnancy And Lactation Text: High dose vitamin A therapy is contraindicated during pregnancy and breast feeding.
Erythromycin Counseling:  I discussed with the patient the risks of erythromycin including but not limited to GI upset, allergic reaction, drug rash, diarrhea, increase in liver enzymes, and yeast infections.
Dapsone Pregnancy And Lactation Text: This medication is Pregnancy Category C and is not considered safe during pregnancy or breast feeding.
Medical Necessity Information: LCD Guidelines vary from payer to payer. Please check with your payer's policy to determine medical necessity.
Isotretinoin Pregnancy And Lactation Text: This medication is Pregnancy Category X and is considered extremely dangerous during pregnancy. It is unknown if it is excreted in breast milk.
Topical Sulfur Applications Counseling: Topical Sulfur Counseling: Patient counseled that this medication may cause skin irritation or allergic reactions.  In the event of skin irritation, the patient was advised to reduce the amount of the drug applied or use it less frequently.   The patient verbalized understanding of the proper use and possible adverse effects of topical sulfur application.  All of the patient's questions and concerns were addressed.
Tazorac Counseling:  Patient advised that medication is irritating and drying.  Patient may need to apply sparingly and wash off after an hour before eventually leaving it on overnight.  The patient verbalized understanding of the proper use and possible adverse effects of tazorac.  All of the patient's questions and concerns were addressed.
Benzoyl Peroxide Pregnancy And Lactation Text: This medication is Pregnancy Category C. It is unknown if benzoyl peroxide is excreted in breast milk.
Tetracycline Pregnancy And Lactation Text: This medication is Pregnancy Category D and not consider safe during pregnancy. It is also excreted in breast milk.
Dapsone Counseling: I discussed with the patient the risks of dapsone including but not limited to hemolytic anemia, agranulocytosis, rashes, methemoglobinemia, kidney failure, peripheral neuropathy, headaches, GI upset, and liver toxicity.  Patients who start dapsone require monitoring including baseline LFTs and weekly CBCs for the first month, then every month thereafter.  The patient verbalized understanding of the proper use and possible adverse effects of dapsone.  All of the patient's questions and concerns were addressed.
Bactrim Pregnancy And Lactation Text: This medication is Pregnancy Category D and is known to cause fetal risk.  It is also excreted in breast milk.
Doxycycline Pregnancy And Lactation Text: This medication is Pregnancy Category D and not consider safe during pregnancy. It is also excreted in breast milk but is considered safe for shorter treatment courses.
Topical Sulfur Applications Pregnancy And Lactation Text: This medication is Pregnancy Category C and has an unknown safety profile during pregnancy. It is unknown if this topical medication is excreted in breast milk.
Topical Clindamycin Counseling: Patient counseled that this medication may cause skin irritation or allergic reactions.  In the event of skin irritation, the patient was advised to reduce the amount of the drug applied or use it less frequently.   The patient verbalized understanding of the proper use and possible adverse effects of clindamycin.  All of the patient's questions and concerns were addressed.
Include Pregnancy/Lactation Warning?: No
Tazorac Pregnancy And Lactation Text: This medication is not safe during pregnancy. It is unknown if this medication is excreted in breast milk.
Birth Control Pills Counseling: Birth Control Pill Counseling: I discussed with the patient the potential side effects of OCPs including but not limited to increased risk of stroke, heart attack, thrombophlebitis, deep venous thrombosis, hepatic adenomas, breast changes, GI upset, headaches, and depression.  The patient verbalized understanding of the proper use and possible adverse effects of OCPs. All of the patient's questions and concerns were addressed.
Azelaic Acid Counseling: Patient counseled that medicine may cause skin irritation and to avoid applying near the eyes.  In the event of skin irritation, the patient was advised to reduce the amount of the drug applied or use it less frequently.   The patient verbalized understanding of the proper use and possible adverse effects of azelaic acid.  All of the patient's questions and concerns were addressed.
Medical Necessity Clause: Botulinum toxin hyperhidrosis therapy is medically necessary because
Benzoyl Peroxide Counseling: Patient counseled that medicine may cause skin irritation and bleach clothing.  In the event of skin irritation, the patient was advised to reduce the amount of the drug applied or use it less frequently.   The patient verbalized understanding of the proper use and possible adverse effects of benzoyl peroxide.  All of the patient's questions and concerns were addressed.
Topical Clindamycin Pregnancy And Lactation Text: This medication is Pregnancy Category B and is considered safe during pregnancy. It is unknown if it is excreted in breast milk.
Doxycycline Counseling:  Patient counseled regarding possible photosensitivity and increased risk for sunburn.  Patient instructed to avoid sunlight, if possible.  When exposed to sunlight, patients should wear protective clothing, sunglasses, and sunscreen.  The patient was instructed to call the office immediately if the following severe adverse effects occur:  hearing changes, easy bruising/bleeding, severe headache, or vision changes.  The patient verbalized understanding of the proper use and possible adverse effects of doxycycline.  All of the patient's questions and concerns were addressed.
Birth Control Pills Pregnancy And Lactation Text: This medication should be avoided if pregnant and for the first 30 days post-partum.
Minocycline Counseling: Patient advised regarding possible photosensitivity and discoloration of the teeth, skin, lips, tongue and gums.  Patient instructed to avoid sunlight, if possible.  When exposed to sunlight, patients should wear protective clothing, sunglasses, and sunscreen.  The patient was instructed to call the office immediately if the following severe adverse effects occur:  hearing changes, easy bruising/bleeding, severe headache, or vision changes.  The patient verbalized understanding of the proper use and possible adverse effects of minocycline.  All of the patient's questions and concerns were addressed.
Topical Retinoid Pregnancy And Lactation Text: This medication is Pregnancy Category C. It is unknown if this medication is excreted in breast milk.

## 2022-03-01 NOTE — PROCEDURE: TREATMENT REGIMEN
Action 3: Continue
Action 1: Start
Hide Aquaphor Products: No
Sig For Treatment 1 (If Needed): 100mg daily
Detail Level: Zone
Treatment 1: spironolactone

## 2022-03-31 ENCOUNTER — TELEPHONE (OUTPATIENT)
Dept: FAMILY MEDICINE | Facility: CLINIC | Age: 37
End: 2022-03-31
Payer: COMMERCIAL

## 2022-03-31 DIAGNOSIS — F41.1 GENERALIZED ANXIETY DISORDER: ICD-10-CM

## 2022-03-31 RX ORDER — BUPROPION HYDROCHLORIDE 150 MG/1
TABLET ORAL
Qty: 90 TABLET | Refills: 1 | Status: SHIPPED | OUTPATIENT
Start: 2022-03-31 | End: 2022-07-14

## 2022-03-31 RX ORDER — FLUOXETINE 10 MG/1
CAPSULE ORAL
Qty: 30 CAPSULE | Refills: 1 | Status: SHIPPED | OUTPATIENT
Start: 2022-03-31 | End: 2022-06-02

## 2022-03-31 NOTE — TELEPHONE ENCOUNTER
Prescription approved per [facility] Refill Protocol.  Rubi Yu RN on 3/31/2022 at 11:14 AM

## 2022-04-01 RX ORDER — FLUOXETINE 40 MG/1
40 CAPSULE ORAL DAILY
Qty: 30 CAPSULE | Refills: 1 | Status: SHIPPED | OUTPATIENT
Start: 2022-04-01 | End: 2022-05-26

## 2022-04-01 NOTE — TELEPHONE ENCOUNTER
Patient is on Prozac 50 mg daily  10 mg was sent but the 40 mg strength has not been sent yet.  RN refilled the 40 mg caps    Will route to provider- provider had wanted patient to check in after about 4 weeks on this increased dose (was due around 3/19/2022).  Should this be in a virtual visit form?    Bridgette Erickson RN  [facility]

## 2022-04-01 NOTE — TELEPHONE ENCOUNTER
[employer] message sent advising patient to schedule e-visit with Allison Younger, PAC. Closing encounter.     Rebeca Segal RN   [facility]

## 2022-05-19 ENCOUNTER — RX ONLY (RX ONLY)
Age: 37
End: 2022-05-19

## 2022-05-19 RX ORDER — SPIRONOLACTONE 100 MG/1
TABLET, FILM COATED ORAL
Qty: 30 | Refills: 0 | Status: ERX

## 2022-05-19 RX ORDER — GLYCOPYRROLATE 1 MG/1
TABLET ORAL QD
Qty: 60 | Refills: 0 | Status: ERX

## 2022-05-26 DIAGNOSIS — F41.1 GENERALIZED ANXIETY DISORDER: ICD-10-CM

## 2022-05-26 RX ORDER — FLUOXETINE 40 MG/1
CAPSULE ORAL
Qty: 30 CAPSULE | Refills: 1 | Status: SHIPPED | OUTPATIENT
Start: 2022-05-26 | End: 2022-07-14

## 2022-05-26 NOTE — TELEPHONE ENCOUNTER
"Requested Prescriptions   Signed Prescriptions Disp Refills    FLUoxetine (PROZAC) 40 MG capsule 30 capsule 1     Sig: TAKE 1 CAPSULE(40 MG) BY MOUTH DAILY       SSRIs Protocol Passed - 5/26/2022 11:18 AM        Passed - Recent (12 mo) or future (30 days) visit within the authorizing provider's specialty     Patient has had an office visit with the authorizing provider or a provider within the authorizing providers department within the previous 12 mos or has a future within next 30 days. See \"Patient Info\" tab in inbasket, or \"Choose Columns\" in Meds & Orders section of the refill encounter.              Passed - Medication is active on med list        Passed - Patient is age 18 or older        Passed - No active pregnancy on record        Passed - No positive pregnancy test in last 12 months           Nimco Murrell RN  [facility]     "

## 2022-06-02 DIAGNOSIS — F41.1 GENERALIZED ANXIETY DISORDER: ICD-10-CM

## 2022-06-02 RX ORDER — FLUOXETINE 10 MG/1
CAPSULE ORAL
Qty: 30 CAPSULE | Refills: 1 | Status: SHIPPED | OUTPATIENT
Start: 2022-06-02 | End: 2022-07-14

## 2022-06-02 NOTE — TELEPHONE ENCOUNTER
"Pt had her 40 mg prozac refilled but not her 10 mg script, therefore this is not a duplicate.     Requested Prescriptions   Signed Prescriptions Disp Refills    FLUoxetine (PROZAC) 10 MG capsule 30 capsule 1     Sig: TAKE 1 CAPSULE BY MOUTH EVERY DAY WITH 40 MG CAPSULE BY MOUTH EVERY DAY FOR A DAILY DOSE OF 50 MG.       SSRIs Protocol Passed - 6/2/2022  8:02 AM        Passed - Recent (12 mo) or future (30 days) visit within the authorizing provider's specialty     Patient has had an office visit with the authorizing provider or a provider within the authorizing providers department within the previous 12 mos or has a future within next 30 days. See \"Patient Info\" tab in inbasket, or \"Choose Columns\" in Meds & Orders section of the refill encounter.              Passed - Medication is active on med list        Passed - Patient is age 18 or older        Passed - No active pregnancy on record        Passed - No positive pregnancy test in last 12 months           Nimco Murrell RN  [facility]     "

## 2022-06-14 ENCOUNTER — APPOINTMENT (OUTPATIENT)
Dept: URBAN - METROPOLITAN AREA CLINIC 252 | Age: 37
Setting detail: DERMATOLOGY
End: 2022-06-14

## 2022-06-14 VITALS — WEIGHT: 180 LBS | RESPIRATION RATE: 16 BRPM | HEIGHT: 65 IN

## 2022-06-14 DIAGNOSIS — L74.51 PRIMARY FOCAL HYPERHIDROSIS: ICD-10-CM

## 2022-06-14 DIAGNOSIS — L70.0 ACNE VULGARIS: ICD-10-CM

## 2022-06-14 DIAGNOSIS — Z41.9 ENCOUNTER FOR PROCEDURE FOR PURPOSES OTHER THAN REMEDYING HEALTH STATE, UNSPECIFIED: ICD-10-CM

## 2022-06-14 PROBLEM — L74.511 PRIMARY FOCAL HYPERHIDROSIS, FACE: Status: ACTIVE | Noted: 2022-06-14

## 2022-06-14 PROCEDURE — OTHER COUNSELING: OTHER

## 2022-06-14 PROCEDURE — OTHER MIPS QUALITY: OTHER

## 2022-06-14 PROCEDURE — OTHER JEUVEAU (U OR CC): OTHER

## 2022-06-14 PROCEDURE — 99214 OFFICE O/P EST MOD 30 MIN: CPT

## 2022-06-14 PROCEDURE — OTHER PRESCRIPTION: OTHER

## 2022-06-14 RX ORDER — GLYCOPYRROLATE 1 MG/1
1 MG TABLET ORAL BID
Qty: 360 | Refills: 1 | Status: ERX

## 2022-06-14 RX ORDER — SPIRONOLACTONE 100 MG/1
100MG TABLET, FILM COATED ORAL TWICE PER DAY
Qty: 180 | Refills: 3 | Status: ERX

## 2022-06-14 ASSESSMENT — LOCATION DETAILED DESCRIPTION DERM
LOCATION DETAILED: RIGHT CENTRAL MALAR CHEEK
LOCATION DETAILED: LEFT CENTRAL MALAR CHEEK
LOCATION DETAILED: LEFT INFERIOR CENTRAL MALAR CHEEK

## 2022-06-14 ASSESSMENT — LOCATION SIMPLE DESCRIPTION DERM
LOCATION SIMPLE: RIGHT CHEEK
LOCATION SIMPLE: LEFT CHEEK

## 2022-06-14 ASSESSMENT — LOCATION ZONE DERM: LOCATION ZONE: FACE

## 2022-06-14 NOTE — HPI: SWEATING (HYPERHIDROSIS)
Is This A New Presentation, Or A Follow-Up?: Follow Up Hyperhidrosis
Additional History: Using Robinul (glycopyrrolate) 1mg bid. Ran lit a while ago. Has helped only a little. Denies blurry vision, dry mouth, or headache. Happens sporadically even if no anxiety.

## 2022-06-14 NOTE — PROCEDURE: JEUVEAU (U OR CC)
Consent: - Verbal and written consent obtained.\\n- Discussed the risks that include but not limited to lid/brow ptosis, bruising, headache, asymmetry, swelling, diplopia, temporary effect, and incomplete chemical denervation.\\n- Advised that it can take up to 14 days for the full effect of the Jeuveau to take place.\\n- Generally the effects of Jeuveau are expected to last for 3-5 months, but length of duration can vary.

## 2022-06-14 NOTE — PROCEDURE: COUNSELING
Use Enhanced Medication Counseling?: No
Minocycline Pregnancy And Lactation Text: This medication is Pregnancy Category D and not consider safe during pregnancy. It is also excreted in breast milk.
Azithromycin Pregnancy And Lactation Text: This medication is considered safe during pregnancy and is also secreted in breast milk.
Topical Retinoid counseling:  Patient advised to apply a pea-sized amount only at bedtime and wait 30 minutes after washing their face before applying.  If too drying, patient may add a non-comedogenic moisturizer. The patient verbalized understanding of the proper use and possible adverse effects of retinoids.  All of the patient's questions and concerns were addressed.
Aklief counseling:  Patient advised to apply a pea-sized amount only at bedtime and wait 30 minutes after washing their face before applying.  If too drying, patient may add a non-comedogenic moisturizer.  The most commonly reported side effects including irritation, redness, scaling, dryness, stinging, burning, itching, and increased risk of sunburn.  The patient verbalized understanding of the proper use and possible adverse effects of retinoids.  All of the patient's questions and concerns were addressed.
Tetracycline Counseling: Patient counseled regarding possible photosensitivity and increased risk for sunburn.  Patient instructed to avoid sunlight, if possible.  When exposed to sunlight, patients should wear protective clothing, sunglasses, and sunscreen.  The patient was instructed to call the office immediately if the following severe adverse effects occur:  hearing changes, easy bruising/bleeding, severe headache, or vision changes.  The patient verbalized understanding of the proper use and possible adverse effects of tetracycline.  All of the patient's questions and concerns were addressed. Patient understands to avoid pregnancy while on therapy due to potential birth defects.
Medical Necessity Information: LCD Guidelines vary from payer to payer. Please check with your payer's policy to determine medical necessity.
Dapsone Pregnancy And Lactation Text: This medication is Pregnancy Category C and is not considered safe during pregnancy or breast feeding.
Patient Specific Counseling (Will Not Stick From Patient To Patient): - Recommended increasing Spironolactone 100 mg QD to BID. Discussed increasing by 1/2 a tab at night for 1 week and then increase to 1 full tab at night. If pt should experience any SE with the increase  she should decrease back to 150 mg daily.
Patient Specific Counseling (Will Not Stick From Patient To Patient): - Recommended increasing glycopyrollate to a total of 4 mg daily, 2 mg in the morning for 1 week and then 2 mg in the morning and at night.\\n- Discussed the importance of when increasing Spironolactone or the Glycopyrrolate make sure not to do this at the same time as if pt should experience SE she will not know which medication is causing the SE.
Topical Retinoid Pregnancy And Lactation Text: This medication is Pregnancy Category C. It is unknown if this medication is excreted in breast milk.
Bactrim Counseling:  I discussed with the patient the risks of sulfa antibiotics including but not limited to GI upset, allergic reaction, drug rash, diarrhea, dizziness, photosensitivity, and yeast infections.  Rarely, more serious reactions can occur including but not limited to aplastic anemia, agranulocytosis, methemoglobinemia, blood dyscrasias, liver or kidney failure, lung infiltrates or desquamative/blistering drug rashes.
Erythromycin Pregnancy And Lactation Text: This medication is Pregnancy Category B and is considered safe during pregnancy. It is also excreted in breast milk.
Dapsone Counseling: I discussed with the patient the risks of dapsone including but not limited to hemolytic anemia, agranulocytosis, rashes, methemoglobinemia, kidney failure, peripheral neuropathy, headaches, GI upset, and liver toxicity.  Patients who start dapsone require monitoring including baseline LFTs and weekly CBCs for the first month, then every month thereafter.  The patient verbalized understanding of the proper use and possible adverse effects of dapsone.  All of the patient's questions and concerns were addressed.
Aklief Pregnancy And Lactation Text: It is unknown if this medication is safe to use during pregnancy.  It is unknown if this medication is excreted in breast milk.  Breastfeeding women should use the topical cream on the smallest area of the skin for the shortest time needed while breastfeeding.  Do not apply to nipple and areola.
Winlevi Counseling:  I discussed with the patient the risks of topical clascoterone including but not limited to erythema, scaling, itching, and stinging. Patient voiced their understanding.
Tazorac Pregnancy And Lactation Text: This medication is not safe during pregnancy. It is unknown if this medication is excreted in breast milk.
Sarecycline Counseling: Patient advised regarding possible photosensitivity and discoloration of the teeth, skin, lips, tongue and gums.  Patient instructed to avoid sunlight, if possible.  When exposed to sunlight, patients should wear protective clothing, sunglasses, and sunscreen.  The patient was instructed to call the office immediately if the following severe adverse effects occur:  hearing changes, easy bruising/bleeding, severe headache, or vision changes.  The patient verbalized understanding of the proper use and possible adverse effects of sarecycline.  All of the patient's questions and concerns were addressed.
Minocycline Counseling: Patient advised regarding possible photosensitivity and discoloration of the teeth, skin, lips, tongue and gums.  Patient instructed to avoid sunlight, if possible.  When exposed to sunlight, patients should wear protective clothing, sunglasses, and sunscreen.  The patient was instructed to call the office immediately if the following severe adverse effects occur:  hearing changes, easy bruising/bleeding, severe headache, or vision changes.  The patient verbalized understanding of the proper use and possible adverse effects of minocycline.  All of the patient's questions and concerns were addressed.
Spironolactone Counseling: Patient advised regarding risks of diarrhea, abdominal pain, hyperkalemia, birth defects (for female patients), liver toxicity and renal toxicity. The patient may need blood work to monitor liver and kidney function and potassium levels while on therapy. The patient verbalized understanding of the proper use and possible adverse effects of spironolactone.  All of the patient's questions and concerns were addressed.
Topical Sulfur Applications Pregnancy And Lactation Text: This medication is Pregnancy Category C and has an unknown safety profile during pregnancy. It is unknown if this topical medication is excreted in breast milk.
Topical Clindamycin Counseling: Patient counseled that this medication may cause skin irritation or allergic reactions.  In the event of skin irritation, the patient was advised to reduce the amount of the drug applied or use it less frequently.   The patient verbalized understanding of the proper use and possible adverse effects of clindamycin.  All of the patient's questions and concerns were addressed.
Azelaic Acid Counseling: Patient counseled that medicine may cause skin irritation and to avoid applying near the eyes.  In the event of skin irritation, the patient was advised to reduce the amount of the drug applied or use it less frequently.   The patient verbalized understanding of the proper use and possible adverse effects of azelaic acid.  All of the patient's questions and concerns were addressed.
Azithromycin Counseling:  I discussed with the patient the risks of azithromycin including but not limited to GI upset, allergic reaction, drug rash, diarrhea, and yeast infections.
Erythromycin Counseling:  I discussed with the patient the risks of erythromycin including but not limited to GI upset, allergic reaction, drug rash, diarrhea, increase in liver enzymes, and yeast infections.
Topical Sulfur Applications Counseling: Topical Sulfur Counseling: Patient counseled that this medication may cause skin irritation or allergic reactions.  In the event of skin irritation, the patient was advised to reduce the amount of the drug applied or use it less frequently.   The patient verbalized understanding of the proper use and possible adverse effects of topical sulfur application.  All of the patient's questions and concerns were addressed.
Spironolactone Pregnancy And Lactation Text: This medication can cause feminization of the male fetus and should be avoided during pregnancy. The active metabolite is also found in breast milk.
Medical Necessity Clause: Botulinum toxin hyperhidrosis therapy is medically necessary because
Tazorac Counseling:  Patient advised that medication is irritating and drying.  Patient may need to apply sparingly and wash off after an hour before eventually leaving it on overnight.  The patient verbalized understanding of the proper use and possible adverse effects of tazorac.  All of the patient's questions and concerns were addressed.
Benzoyl Peroxide Counseling: Patient counseled that medicine may cause skin irritation and bleach clothing.  In the event of skin irritation, the patient was advised to reduce the amount of the drug applied or use it less frequently.   The patient verbalized understanding of the proper use and possible adverse effects of benzoyl peroxide.  All of the patient's questions and concerns were addressed.
Bactrim Pregnancy And Lactation Text: This medication is Pregnancy Category D and is known to cause fetal risk.  It is also excreted in breast milk.
Azelaic Acid Pregnancy And Lactation Text: This medication is considered safe during pregnancy and breast feeding.
Benzoyl Peroxide Pregnancy And Lactation Text: This medication is Pregnancy Category C. It is unknown if benzoyl peroxide is excreted in breast milk.
Birth Control Pills Pregnancy And Lactation Text: This medication should be avoided if pregnant and for the first 30 days post-partum.
Isotretinoin Pregnancy And Lactation Text: This medication is Pregnancy Category X and is considered extremely dangerous during pregnancy. It is unknown if it is excreted in breast milk.
Detail Level: Zone
High Dose Vitamin A Counseling: Side effects reviewed, pt to contact office should one occur.
Birth Control Pills Counseling: Birth Control Pill Counseling: I discussed with the patient the potential side effects of OCPs including but not limited to increased risk of stroke, heart attack, thrombophlebitis, deep venous thrombosis, hepatic adenomas, breast changes, GI upset, headaches, and depression.  The patient verbalized understanding of the proper use and possible adverse effects of OCPs. All of the patient's questions and concerns were addressed.
Doxycycline Counseling:  Patient counseled regarding possible photosensitivity and increased risk for sunburn.  Patient instructed to avoid sunlight, if possible.  When exposed to sunlight, patients should wear protective clothing, sunglasses, and sunscreen.  The patient was instructed to call the office immediately if the following severe adverse effects occur:  hearing changes, easy bruising/bleeding, severe headache, or vision changes.  The patient verbalized understanding of the proper use and possible adverse effects of doxycycline.  All of the patient's questions and concerns were addressed.
Winlevi Pregnancy And Lactation Text: This medication is considered safe during pregnancy and breastfeeding.
Topical Clindamycin Pregnancy And Lactation Text: This medication is Pregnancy Category B and is considered safe during pregnancy. It is unknown if it is excreted in breast milk.
Isotretinoin Counseling: Patient should get monthly blood tests, not donate blood, not drive at night if vision affected, not share medication, and not undergo elective surgery for 6 months after tx completed. Side effects reviewed, pt to contact office should one occur.
Doxycycline Pregnancy And Lactation Text: This medication is Pregnancy Category D and not consider safe during pregnancy. It is also excreted in breast milk but is considered safe for shorter treatment courses.
High Dose Vitamin A Pregnancy And Lactation Text: High dose vitamin A therapy is contraindicated during pregnancy and breast feeding.
Detail Level: Detailed

## 2022-06-14 NOTE — HPI: PIMPLES (ACNE)
How Severe Is Your Acne?: moderate
Is This A New Presentation, Or A Follow-Up?: Acne
Additional Comments (Use Complete Sentences): Started spironolactone 100mg qd at last office visit as well as adapalene 0.1% gel qhs. Patients denies personal history of high blood pressure, low blood pressure, renal disease, congestive heart failure, or frequent use of NSAIDs. Denies any side effects.  Only gets new pimples around period, worse with anxiety as well. Not sure of using adapalene?

## 2022-06-14 NOTE — PROCEDURE: JEUVEAU (U OR CC)
Post-Care Instructions: - Skin was cleansed with 70% isopropyl alcohol prior to injection today.\\n- Patient was instructed to not lie down for touch area for 6 hrs.\\n\\nTOTAL JEUVEAU USED TODAY: 20 units\\n\\nCOLOR KEY FOR DIAGRAM BELOW:\\nRed = 1/2 unit of Jeuveau per injection\\nOrange = 1 units of Jeuveau per injection\\nYellow = 2 units of Jeuveau per injection\\nGreen = 3 units of Jeuveau per injection\\nBlue = 4 units of Jeuveau per injection\\nBrown = 5 units of Jeuveau per injection\\nBlack = 6 units of Jeuveau per injection

## 2022-06-14 NOTE — PROCEDURE: JEUVEAU (U OR CC)
Price (Use Numbers Only, No Special Characters Or $): 745 Price (Use Numbers Only, No Special Characters Or $): 326

## 2022-07-14 ENCOUNTER — VIRTUAL VISIT (OUTPATIENT)
Dept: FAMILY MEDICINE | Facility: CLINIC | Age: 37
End: 2022-07-14
Payer: COMMERCIAL

## 2022-07-14 DIAGNOSIS — F41.1 GENERALIZED ANXIETY DISORDER: ICD-10-CM

## 2022-07-14 PROCEDURE — 99214 OFFICE O/P EST MOD 30 MIN: CPT | Mod: GT | Performed by: FAMILY MEDICINE

## 2022-07-14 RX ORDER — FLUOXETINE 40 MG/1
CAPSULE ORAL
Qty: 30 CAPSULE | Refills: 1 | Status: SHIPPED | OUTPATIENT
Start: 2022-07-14 | End: 2022-08-03

## 2022-07-14 RX ORDER — FLUOXETINE 10 MG/1
CAPSULE ORAL
Qty: 30 CAPSULE | Refills: 1 | Status: SHIPPED | OUTPATIENT
Start: 2022-07-14 | End: 2022-08-03

## 2022-07-14 RX ORDER — GLYCOPYRROLATE 1 MG/1
TABLET ORAL
COMMUNITY
Start: 2022-06-15

## 2022-07-14 RX ORDER — SPIRONOLACTONE 100 MG/1
TABLET, FILM COATED ORAL
COMMUNITY
Start: 2022-06-14

## 2022-07-14 RX ORDER — BUPROPION HYDROCHLORIDE 150 MG/1
TABLET ORAL
Qty: 30 TABLET | Refills: 0 | Status: SHIPPED | OUTPATIENT
Start: 2022-07-14 | End: 2022-08-03

## 2022-07-14 ASSESSMENT — ANXIETY QUESTIONNAIRES
GAD7 TOTAL SCORE: 11
6. BECOMING EASILY ANNOYED OR IRRITABLE: NEARLY EVERY DAY
3. WORRYING TOO MUCH ABOUT DIFFERENT THINGS: SEVERAL DAYS
7. FEELING AFRAID AS IF SOMETHING AWFUL MIGHT HAPPEN: SEVERAL DAYS
2. NOT BEING ABLE TO STOP OR CONTROL WORRYING: NOT AT ALL
7. FEELING AFRAID AS IF SOMETHING AWFUL MIGHT HAPPEN: SEVERAL DAYS
4. TROUBLE RELAXING: MORE THAN HALF THE DAYS
GAD7 TOTAL SCORE: 11
5. BEING SO RESTLESS THAT IT IS HARD TO SIT STILL: MORE THAN HALF THE DAYS
8. IF YOU CHECKED OFF ANY PROBLEMS, HOW DIFFICULT HAVE THESE MADE IT FOR YOU TO DO YOUR WORK, TAKE CARE OF THINGS AT HOME, OR GET ALONG WITH OTHER PEOPLE?: VERY DIFFICULT
GAD7 TOTAL SCORE: 11
1. FEELING NERVOUS, ANXIOUS, OR ON EDGE: MORE THAN HALF THE DAYS

## 2022-07-14 ASSESSMENT — PATIENT HEALTH QUESTIONNAIRE - PHQ9
10. IF YOU CHECKED OFF ANY PROBLEMS, HOW DIFFICULT HAVE THESE PROBLEMS MADE IT FOR YOU TO DO YOUR WORK, TAKE CARE OF THINGS AT HOME, OR GET ALONG WITH OTHER PEOPLE: SOMEWHAT DIFFICULT
SUM OF ALL RESPONSES TO PHQ QUESTIONS 1-9: 11
SUM OF ALL RESPONSES TO PHQ QUESTIONS 1-9: 11

## 2022-07-14 NOTE — PROGRESS NOTES
"Bernice Garcia is a 37 year old who is being evaluated via a billable video visit.      How would you like to obtain your AVS? MyChart  If the video visit is dropped, the invitation should be resent by: Send to e-mail at: [email or web address]  Will anyone else be joining your video visit? No    Assessment & Plan   Problem List Items Addressed This Visit        Behavioral    Generalized anxiety disorder     Patient is new to me presenting via video visit for refill of prozac 50 mg po q day and wellbutrin 150mg 24 hr tab.     Her phq 9 was positive for concerns of safety, however she assures me she is safe and sometimes thinks it would be okay if she did not wake up tomorrow, but she is not actively suicidal and lives for her family    meds are refilled and referral given for psychiatry and psychology consults.            Relevant Medications    buPROPion (WELLBUTRIN XL) 150 MG 24 hr tablet    FLUoxetine (PROZAC) 10 MG capsule    FLUoxetine (PROZAC) 40 MG capsule    Other Relevant Orders    Adult Mental Health  Referral    Adult Mental Health  Referral              BMI:   Estimated body mass index is 31.36 kg/m  as calculated from the following:    Height as of 9/16/21: 1.672 m (5' 5.83\").    Weight as of 9/16/21: 87.7 kg (193 lb 4.8 oz).     Depression Screening Follow Up    PHQ 7/14/2022   PHQ-9 Total Score 11   Q9: Thoughts of better off dead/self-harm past 2 weeks Several days   F/U: Thoughts of suicide or self-harm No   F/U: Safety concerns No     Last PHQ-9 7/14/2022   1.  Little interest or pleasure in doing things 1   2.  Feeling down, depressed, or hopeless 1   3.  Trouble falling or staying asleep, or sleeping too much 1   4.  Feeling tired or having little energy 1   5.  Poor appetite or overeating 1   6.  Feeling bad about yourself 1   7.  Trouble concentrating 3   8.  Moving slowly or restless 1   Q9: Thoughts of better off dead/self-harm past 2 weeks 1   PHQ-9 Total Score 11 "   Difficulty at work, home, or with people -   In the past two weeks have you had thoughts of suicide or self harm? No   Do you have concerns about your personal safety or the safety of others? No     Follow Up      Follow Up Actions Taken  Crisis resource information provided in the After Visit Summary    No follow-ups on file.   Follow-up Visit   Expected date: Jul 15, 2022      Follow Up Appointment Details:     Follow-up with whom?: Any Primary Taking New Patients    Follow-Up for what?: Chronic Disease f/u    Chronic Disease f/u: Anxiety    How?: In Person                    Chiquita Medina MD  [facility]   Bernice Garcia is a 37 year old who is new to me, presenting via video visit for the following health issues:  Refill Request  wants refill of her wellbutrin and prozac.   She says she is not feeling like harming herself.   She says she lives for her  and kids.     History of Present Illness       Mental Health Follow-up:  Patient presents to follow-up on Depression & Anxiety.Patient's depression since last visit has been:  Good  The patient is not having other symptoms associated with depression.  Patient's anxiety since last visit has been:  Better  The patient is having other symptoms associated with anxiety.  Any significant life events: No  Patient is feeling anxious or having panic attacks.  Patient has no concerns about alcohol or drug use.    She eats 2-3 servings of fruits and vegetables daily.She consumes 0 sweetened beverage(s) daily.She exercises with enough effort to increase her heart rate 30 to 60 minutes per day.  She exercises with enough effort to increase her heart rate 3 or less days per week. She is missing 1 dose(s) of medications per week.  She is not taking prescribed medications regularly due to remembering to take.    Today's PHQ-9         PHQ-9 Total Score: 11    PHQ-9 Q9 Thoughts of better off dead/self-harm past 2 weeks :    Several days  Thoughts of suicide or self harm: (P) No  Self-harm Plan:     Self-harm Action:       Safety concerns for self or others: (P) No    How difficult have these problems made it for you to do your work, take care of things at home, or get along with other people: Somewhat difficult  Today's AMANDA-7 Score: 11           Objective           Vitals:  No vitals were obtained today due to virtual visit.    Physical Exam   GENERAL: Healthy, alert and no distress  EYES: Eyes grossly normal to inspection.  No discharge or erythema, or obvious scleral/conjunctival abnormalities.  RESP: No audible wheeze, cough, or visible cyanosis.  No visible retractions or increased work of breathing.    SKIN: Visible skin clear. No significant rash, abnormal pigmentation or lesions.  NEURO: Cranial nerves grossly intact.  Mentation and speech appropriate for age.  PSYCH: Mentation appears normal, affect normal/bright, judgement and insight intact, normal speech and appearance well-groomed.                Video-Visit Details    Video Start Time: 3:111 PM    Type of service:  Video Visit    Video End Time:3:29 PM    Originating Location (pt. Location): Home    Distant Location (provider location):  [facility]     Platform used for Video Visit: Swype  Ruth.

## 2022-07-14 NOTE — ASSESSMENT & PLAN NOTE
Patient is new to me presenting via video visit for refill of prozac 50 mg po q day and wellbutrin 150mg 24 hr tab.     Her phq 9 was positive for concerns of safety, however she assures me she is safe and sometimes thinks it would be okay if she did not wake up tomorrow, but she is not actively suicidal and lives for her family    meds are refilled and referral given for psychiatry and psychology consults.

## 2022-08-01 ENCOUNTER — E-VISIT (OUTPATIENT)
Dept: FAMILY MEDICINE | Facility: CLINIC | Age: 37
End: 2022-08-01
Payer: COMMERCIAL

## 2022-08-01 DIAGNOSIS — F41.1 GENERALIZED ANXIETY DISORDER: ICD-10-CM

## 2022-08-01 PROCEDURE — 99421 OL DIG E/M SVC 5-10 MIN: CPT | Performed by: PHYSICIAN ASSISTANT

## 2022-08-01 ASSESSMENT — ANXIETY QUESTIONNAIRES
7. FEELING AFRAID AS IF SOMETHING AWFUL MIGHT HAPPEN: NOT AT ALL
GAD7 TOTAL SCORE: 7
2. NOT BEING ABLE TO STOP OR CONTROL WORRYING: SEVERAL DAYS
7. FEELING AFRAID AS IF SOMETHING AWFUL MIGHT HAPPEN: NOT AT ALL
GAD7 TOTAL SCORE: 7
4. TROUBLE RELAXING: MORE THAN HALF THE DAYS
8. IF YOU CHECKED OFF ANY PROBLEMS, HOW DIFFICULT HAVE THESE MADE IT FOR YOU TO DO YOUR WORK, TAKE CARE OF THINGS AT HOME, OR GET ALONG WITH OTHER PEOPLE?: SOMEWHAT DIFFICULT
6. BECOMING EASILY ANNOYED OR IRRITABLE: SEVERAL DAYS
GAD7 TOTAL SCORE: 7
3. WORRYING TOO MUCH ABOUT DIFFERENT THINGS: SEVERAL DAYS
1. FEELING NERVOUS, ANXIOUS, OR ON EDGE: SEVERAL DAYS
5. BEING SO RESTLESS THAT IT IS HARD TO SIT STILL: SEVERAL DAYS

## 2022-08-01 ASSESSMENT — PATIENT HEALTH QUESTIONNAIRE - PHQ9
10. IF YOU CHECKED OFF ANY PROBLEMS, HOW DIFFICULT HAVE THESE PROBLEMS MADE IT FOR YOU TO DO YOUR WORK, TAKE CARE OF THINGS AT HOME, OR GET ALONG WITH OTHER PEOPLE: SOMEWHAT DIFFICULT
SUM OF ALL RESPONSES TO PHQ QUESTIONS 1-9: 2
SUM OF ALL RESPONSES TO PHQ QUESTIONS 1-9: 2

## 2022-08-02 ASSESSMENT — PATIENT HEALTH QUESTIONNAIRE - PHQ9: SUM OF ALL RESPONSES TO PHQ QUESTIONS 1-9: 2

## 2022-08-02 ASSESSMENT — ANXIETY QUESTIONNAIRES: GAD7 TOTAL SCORE: 7

## 2022-08-03 RX ORDER — FLUOXETINE 40 MG/1
CAPSULE ORAL
Qty: 90 CAPSULE | Refills: 1 | Status: SHIPPED | OUTPATIENT
Start: 2022-08-03 | End: 2022-08-03

## 2022-08-03 RX ORDER — FLUOXETINE 10 MG/1
CAPSULE ORAL
Qty: 90 CAPSULE | Refills: 1 | Status: SHIPPED | OUTPATIENT
Start: 2022-08-03 | End: 2022-08-03

## 2022-08-03 RX ORDER — FLUOXETINE 40 MG/1
CAPSULE ORAL
Qty: 90 CAPSULE | Refills: 3 | Status: SHIPPED | OUTPATIENT
Start: 2022-08-03 | End: 2023-10-02

## 2022-08-03 RX ORDER — FLUOXETINE 10 MG/1
CAPSULE ORAL
Qty: 90 CAPSULE | Refills: 3 | Status: SHIPPED | OUTPATIENT
Start: 2022-08-03 | End: 2023-10-02

## 2022-08-03 RX ORDER — BUPROPION HYDROCHLORIDE 150 MG/1
TABLET ORAL
Qty: 90 TABLET | Refills: 3 | Status: SHIPPED | OUTPATIENT
Start: 2022-08-03 | End: 2023-10-02

## 2022-08-03 NOTE — TELEPHONE ENCOUNTER
RN's can we reach out to patient. She submitted this E visit but has not responded to my message. There have been some IT issues with E visits so I want to make sure that she is aware of the response.   Allison Younger PA-C

## 2022-08-03 NOTE — TELEPHONE ENCOUNTER
Attempted to call pt, left vm asking for patient to please call back clinic regarding E visit or check mychart.     Thanks,  TYLER Rinaldi  [facility]

## 2022-09-10 ENCOUNTER — HEALTH MAINTENANCE LETTER (OUTPATIENT)
Age: 37
End: 2022-09-10

## 2023-01-22 ENCOUNTER — HEALTH MAINTENANCE LETTER (OUTPATIENT)
Age: 38
End: 2023-01-22

## 2023-02-13 ENCOUNTER — E-VISIT (OUTPATIENT)
Dept: URGENT CARE | Facility: CLINIC | Age: 38
End: 2023-02-13
Payer: COMMERCIAL

## 2023-02-13 ENCOUNTER — OFFICE VISIT (OUTPATIENT)
Dept: URGENT CARE | Facility: URGENT CARE | Age: 38
End: 2023-02-13
Payer: COMMERCIAL

## 2023-02-13 VITALS
SYSTOLIC BLOOD PRESSURE: 129 MMHG | DIASTOLIC BLOOD PRESSURE: 89 MMHG | TEMPERATURE: 97.9 F | BODY MASS INDEX: 30.93 KG/M2 | WEIGHT: 190.6 LBS | OXYGEN SATURATION: 98 % | HEART RATE: 89 BPM

## 2023-02-13 DIAGNOSIS — H57.89 REDNESS OF EYE, RIGHT: Primary | ICD-10-CM

## 2023-02-13 DIAGNOSIS — H10.212 CHEMICAL CONJUNCTIVITIS OF LEFT EYE: Primary | ICD-10-CM

## 2023-02-13 PROCEDURE — 99213 OFFICE O/P EST LOW 20 MIN: CPT | Performed by: EMERGENCY MEDICINE

## 2023-02-13 PROCEDURE — 99207 PR NON-BILLABLE SERV PER CHARTING: CPT | Performed by: NURSE PRACTITIONER

## 2023-02-13 RX ORDER — OFLOXACIN 3 MG/ML
1-2 SOLUTION/ DROPS OPHTHALMIC 4 TIMES DAILY
Qty: 3 ML | Refills: 0 | Status: SHIPPED | OUTPATIENT
Start: 2023-02-13 | End: 2023-02-20

## 2023-02-13 NOTE — LETTER
[facility] CARE [address] [address]          February 13, 2023    RE:  Bernice Garcia                                                                                                                                                       [id number] [facility] [address]            To whom it may concern:    Bernice Garcia is under my professional care for Data Unavailable She  may return to work with the following: The employee is UNABLE to return to work until 2/15/2023.     Sincerely,        Fuad Jarquin {PROVIDER CREDENTIALS:[id number]}

## 2023-02-13 NOTE — LETTER
[facility] CARE [address] [address]          February 13, 2023    RE:  Bernice Garcia                                                                                                                                                       [id number] [facility] [address]            To whom it may concern:    Bernice Garcia is under my professional care for Chemical conjunctivitis of left eye She  may return to work with the following: The employee is UNABLE to return to work until 2/15/2023.     Sincerely,        Fuad Jarquin PA-C

## 2023-02-13 NOTE — PROGRESS NOTES
"Assessment & Plan     Diagnosis:  (H10.212) Chemical conjunctivitis of left eye  (primary encounter diagnosis)  Plan: ofloxacin (OCUFLOX) 0.3 % ophthalmic solution,         Adult Eye  Referral      Medical Decision Making  Bernice Garcia is a 37 year old female who presents for evaluation of a red eye.  A broad differential diagnosis was considered including bacterial conjunctivitis, viral conjunctivitis, foreign body, corneal abrasion, chemical vs allergic conjunctivitis, corneal ulcer, HSV, herpes zoster ophthalmicus, orbital cellulitis, etc.  Signs and symptoms consistent with a conjunctivitis, likely chemical or bacterial given use of Liorel eye cream (states this seemed to burn her eye a little) and her using her child's Polytrim eyedrops which they used for a \"bacterial pinkeye\" a while ago. Patient is not a contact lens wearer.  Visual acuity is intact.  Discussed discarding old eyedrops and will start ophthalmic antibiotics and have close follow-up of eye physician.  No red flag symptoms to suggest any of the above worrisome etiologies.      Patient voices understanding and agreement with the plan including reasons to go to the ER immediately as well as to be seen by a more consistent care-giver, such as their PCP, if the symptoms persist more than 2 days.     CONSTANZA Aguero [facility]    Subjective     Bernice Garcia is a 37 year old female who presents to clinic today for the following health issues:  Chief Complaint   Patient presents with     Urgent Care     Conjunctivitis     Left eye since Friday        HPI    Eye Problem    Onset of symptoms was 3 day(s) ago.   Location: left eye   Course of illness is worsening.    Severity: moderate  Current and Associated symptoms: discharge, mattering, burning, redness, edema  Treatment measures tried include flushed with water   Context: Chemical exposure --put an ice cream on a few nights ago and this is when her " pain started.  She did also start using her child's old antibiotic eyedrops for when they had pinkeye and states things seem to have gotten worse since using them.     Patient denies any vision changes, headaches, ear pain, sore throat, foreign body sensation or recent trauma to the eye.    Review of Systems    See HPI    Objective      Vitals: /89 (BP Location: Left arm, Patient Position: Sitting, Cuff Size: Adult Large)   Pulse 89   Temp 97.9  F (36.6  C) (Tympanic)   Wt 86.5 kg (190 lb 9.6 oz)   SpO2 98%   BMI 30.93 kg/m        Patient Vitals for the past 24 hrs:   BP Temp Temp src Pulse SpO2 Weight   02/13/23 1150 129/89 97.9  F (36.6  C) Tympanic 89 98 % 86.5 kg (190 lb 9.6 oz)       Vital signs reviewed by: Fuad Jarquin PA-C    Physical Exam   Constitutional: Patient is alert. No acute distress.  Mouth: Mucous membranes are moist. Normal tongue and tonsil. Posterior oropharynx is clear.  Eyes: Conjunctivae is injected on the left; redness does not cross the limbus. There is mattering/discharge noted at the eyelid margins on the and at the medial canthus. Lower eyelid is edematous. EOMI are normal. PERRL. Visual acuity is intact (see MA notes for Snellen Eye Chart Results). No abnormal fluorescein uptake; negative Hakeem sign. No foreign body noted in the retracted eyelids or on Bi-focal/Wood's lamp exam.  Neurological: Alert. CN 2-7 intact.      Fuad Jarquin PA-C, February 13, 2023

## 2023-02-13 NOTE — NURSING NOTE
Visual Acuity:    Both Eyes: 20/50    Left Eye: 20/30, unable to see/read one letter on the same line    Right Eye: 20/40, unable to see/read one letter on the same line    Yusuf Vasquez CMA

## 2023-02-24 NOTE — TELEPHONE ENCOUNTER
Called and spoke to the patient and scheduled an office visit (per patient) for 9/21/18.  Marlen Gutierrez MA/  For Teams [employer] and Marie     aspiration precautions/fall precautions/seizure precautions

## 2023-07-28 ENCOUNTER — TELEPHONE (OUTPATIENT)
Dept: FAMILY MEDICINE | Facility: CLINIC | Age: 38
End: 2023-07-28
Payer: COMMERCIAL

## 2023-07-28 NOTE — TELEPHONE ENCOUNTER
Patient Quality Outreach    Patient is due for the following:   Cervical Cancer Screening - PAP Needed  Physical Preventive Adult Physical      Topic Date Due    Hepatitis B Vaccine (2 of 3 - 3-dose series) 11/18/1997    COVID-19 Vaccine (4 - Moderna series) 01/11/2022       Next Steps:   Patient has upcoming appointment, these items will be addressed at that time.    Type of outreach:    Chart review performed, no outreach needed.      Questions for provider review:    None           Tasha Tellez MA

## 2023-09-30 DIAGNOSIS — F41.1 GENERALIZED ANXIETY DISORDER: ICD-10-CM

## 2023-10-02 RX ORDER — FLUOXETINE 10 MG/1
CAPSULE ORAL
Qty: 30 CAPSULE | Refills: 0 | Status: SHIPPED | OUTPATIENT
Start: 2023-10-02

## 2023-10-02 RX ORDER — BUPROPION HYDROCHLORIDE 150 MG/1
TABLET ORAL
Qty: 30 TABLET | Refills: 0 | Status: SHIPPED | OUTPATIENT
Start: 2023-10-02

## 2023-10-02 RX ORDER — FLUOXETINE 40 MG/1
CAPSULE ORAL
Qty: 30 CAPSULE | Refills: 0 | Status: SHIPPED | OUTPATIENT
Start: 2023-10-02

## 2023-10-02 NOTE — TELEPHONE ENCOUNTER
Called pt and lvm. Pt needs to schedule an in person visit for a medication check.    Ana SALAZAR MA

## 2024-02-18 ENCOUNTER — HEALTH MAINTENANCE LETTER (OUTPATIENT)
Age: 39
End: 2024-02-18

## 2024-03-12 ENCOUNTER — APPOINTMENT (OUTPATIENT)
Dept: URBAN - METROPOLITAN AREA CLINIC 259 | Age: 39
Setting detail: DERMATOLOGY
End: 2024-03-13

## 2024-03-12 DIAGNOSIS — L74.51 PRIMARY FOCAL HYPERHIDROSIS: ICD-10-CM

## 2024-03-12 DIAGNOSIS — L70.0 ACNE VULGARIS: ICD-10-CM

## 2024-03-12 PROBLEM — L74.511 PRIMARY FOCAL HYPERHIDROSIS, FACE: Status: ACTIVE | Noted: 2024-03-12

## 2024-03-12 PROCEDURE — OTHER PRESCRIPTION MEDICATION MANAGEMENT: OTHER

## 2024-03-12 PROCEDURE — OTHER MIPS QUALITY: OTHER

## 2024-03-12 PROCEDURE — OTHER COUNSELING: OTHER

## 2024-03-12 PROCEDURE — OTHER PRESCRIPTION: OTHER

## 2024-03-12 PROCEDURE — 99214 OFFICE O/P EST MOD 30 MIN: CPT

## 2024-03-12 RX ORDER — SPIRONOLACTONE 100 MG/1
100MG TABLET, FILM COATED ORAL TWICE PER DAY
Qty: 180 | Refills: 1 | Status: CANCELLED | COMMUNITY
Start: 2024-03-12

## 2024-03-12 RX ORDER — GLYCOPYRROLATE 1 MG/1
1 MG TABLET ORAL BID
Qty: 180 | Refills: 1 | Status: ERX | COMMUNITY
Start: 2024-03-12

## 2024-03-12 ASSESSMENT — LOCATION DETAILED DESCRIPTION DERM
LOCATION DETAILED: LEFT INFERIOR CENTRAL MALAR CHEEK
LOCATION DETAILED: RIGHT CENTRAL MALAR CHEEK
LOCATION DETAILED: LEFT CENTRAL MALAR CHEEK

## 2024-03-12 ASSESSMENT — LOCATION SIMPLE DESCRIPTION DERM
LOCATION SIMPLE: LEFT CHEEK
LOCATION SIMPLE: RIGHT CHEEK

## 2024-03-12 ASSESSMENT — LOCATION ZONE DERM: LOCATION ZONE: FACE

## 2024-03-12 NOTE — PROCEDURE: COUNSELING
Use Enhanced Medication Counseling?: No
Minocycline Pregnancy And Lactation Text: This medication is Pregnancy Category D and not consider safe during pregnancy. It is also excreted in breast milk.
Azithromycin Pregnancy And Lactation Text: This medication is considered safe during pregnancy and is also secreted in breast milk.
Topical Retinoid counseling:  Patient advised to apply a pea-sized amount only at bedtime and wait 30 minutes after washing their face before applying.  If too drying, patient may add a non-comedogenic moisturizer. The patient verbalized understanding of the proper use and possible adverse effects of retinoids.  All of the patient's questions and concerns were addressed.
Aklief counseling:  Patient advised to apply a pea-sized amount only at bedtime and wait 30 minutes after washing their face before applying.  If too drying, patient may add a non-comedogenic moisturizer.  The most commonly reported side effects including irritation, redness, scaling, dryness, stinging, burning, itching, and increased risk of sunburn.  The patient verbalized understanding of the proper use and possible adverse effects of retinoids.  All of the patient's questions and concerns were addressed.
Tetracycline Counseling: Patient counseled regarding possible photosensitivity and increased risk for sunburn.  Patient instructed to avoid sunlight, if possible.  When exposed to sunlight, patients should wear protective clothing, sunglasses, and sunscreen.  The patient was instructed to call the office immediately if the following severe adverse effects occur:  hearing changes, easy bruising/bleeding, severe headache, or vision changes.  The patient verbalized understanding of the proper use and possible adverse effects of tetracycline.  All of the patient's questions and concerns were addressed. Patient understands to avoid pregnancy while on therapy due to potential birth defects.
Dapsone Pregnancy And Lactation Text: This medication is Pregnancy Category C and is not considered safe during pregnancy or breast feeding.
Topical Retinoid Pregnancy And Lactation Text: This medication is Pregnancy Category C. It is unknown if this medication is excreted in breast milk.
Bactrim Counseling:  I discussed with the patient the risks of sulfa antibiotics including but not limited to GI upset, allergic reaction, drug rash, diarrhea, dizziness, photosensitivity, and yeast infections.  Rarely, more serious reactions can occur including but not limited to aplastic anemia, agranulocytosis, methemoglobinemia, blood dyscrasias, liver or kidney failure, lung infiltrates or desquamative/blistering drug rashes.
Erythromycin Pregnancy And Lactation Text: This medication is Pregnancy Category B and is considered safe during pregnancy. It is also excreted in breast milk.
Dapsone Counseling: I discussed with the patient the risks of dapsone including but not limited to hemolytic anemia, agranulocytosis, rashes, methemoglobinemia, kidney failure, peripheral neuropathy, headaches, GI upset, and liver toxicity.  Patients who start dapsone require monitoring including baseline LFTs and weekly CBCs for the first month, then every month thereafter.  The patient verbalized understanding of the proper use and possible adverse effects of dapsone.  All of the patient's questions and concerns were addressed.
Aklief Pregnancy And Lactation Text: It is unknown if this medication is safe to use during pregnancy.  It is unknown if this medication is excreted in breast milk.  Breastfeeding women should use the topical cream on the smallest area of the skin for the shortest time needed while breastfeeding.  Do not apply to nipple and areola.
Winlevi Counseling:  I discussed with the patient the risks of topical clascoterone including but not limited to erythema, scaling, itching, and stinging. Patient voiced their understanding.
Tazorac Pregnancy And Lactation Text: This medication is not safe during pregnancy. It is unknown if this medication is excreted in breast milk.
Sarecycline Counseling: Patient advised regarding possible photosensitivity and discoloration of the teeth, skin, lips, tongue and gums.  Patient instructed to avoid sunlight, if possible.  When exposed to sunlight, patients should wear protective clothing, sunglasses, and sunscreen.  The patient was instructed to call the office immediately if the following severe adverse effects occur:  hearing changes, easy bruising/bleeding, severe headache, or vision changes.  The patient verbalized understanding of the proper use and possible adverse effects of sarecycline.  All of the patient's questions and concerns were addressed.
Minocycline Counseling: Patient advised regarding possible photosensitivity and discoloration of the teeth, skin, lips, tongue and gums.  Patient instructed to avoid sunlight, if possible.  When exposed to sunlight, patients should wear protective clothing, sunglasses, and sunscreen.  The patient was instructed to call the office immediately if the following severe adverse effects occur:  hearing changes, easy bruising/bleeding, severe headache, or vision changes.  The patient verbalized understanding of the proper use and possible adverse effects of minocycline.  All of the patient's questions and concerns were addressed.
Spironolactone Counseling: Patient advised regarding risks of diarrhea, abdominal pain, hyperkalemia, birth defects (for female patients), liver toxicity and renal toxicity. The patient may need blood work to monitor liver and kidney function and potassium levels while on therapy. The patient verbalized understanding of the proper use and possible adverse effects of spironolactone.  All of the patient's questions and concerns were addressed.
Topical Sulfur Applications Pregnancy And Lactation Text: This medication is Pregnancy Category C and has an unknown safety profile during pregnancy. It is unknown if this topical medication is excreted in breast milk.
Topical Clindamycin Counseling: Patient counseled that this medication may cause skin irritation or allergic reactions.  In the event of skin irritation, the patient was advised to reduce the amount of the drug applied or use it less frequently.   The patient verbalized understanding of the proper use and possible adverse effects of clindamycin.  All of the patient's questions and concerns were addressed.
Azelaic Acid Counseling: Patient counseled that medicine may cause skin irritation and to avoid applying near the eyes.  In the event of skin irritation, the patient was advised to reduce the amount of the drug applied or use it less frequently.   The patient verbalized understanding of the proper use and possible adverse effects of azelaic acid.  All of the patient's questions and concerns were addressed.
Azithromycin Counseling:  I discussed with the patient the risks of azithromycin including but not limited to GI upset, allergic reaction, drug rash, diarrhea, and yeast infections.
Erythromycin Counseling:  I discussed with the patient the risks of erythromycin including but not limited to GI upset, allergic reaction, drug rash, diarrhea, increase in liver enzymes, and yeast infections.
Topical Sulfur Applications Counseling: Topical Sulfur Counseling: Patient counseled that this medication may cause skin irritation or allergic reactions.  In the event of skin irritation, the patient was advised to reduce the amount of the drug applied or use it less frequently.   The patient verbalized understanding of the proper use and possible adverse effects of topical sulfur application.  All of the patient's questions and concerns were addressed.
Spironolactone Pregnancy And Lactation Text: This medication can cause feminization of the male fetus and should be avoided during pregnancy. The active metabolite is also found in breast milk.
Tazorac Counseling:  Patient advised that medication is irritating and drying.  Patient may need to apply sparingly and wash off after an hour before eventually leaving it on overnight.  The patient verbalized understanding of the proper use and possible adverse effects of tazorac.  All of the patient's questions and concerns were addressed.
Benzoyl Peroxide Counseling: Patient counseled that medicine may cause skin irritation and bleach clothing.  In the event of skin irritation, the patient was advised to reduce the amount of the drug applied or use it less frequently.   The patient verbalized understanding of the proper use and possible adverse effects of benzoyl peroxide.  All of the patient's questions and concerns were addressed.
Bactrim Pregnancy And Lactation Text: This medication is Pregnancy Category D and is known to cause fetal risk.  It is also excreted in breast milk.
Azelaic Acid Pregnancy And Lactation Text: This medication is considered safe during pregnancy and breast feeding.
Benzoyl Peroxide Pregnancy And Lactation Text: This medication is Pregnancy Category C. It is unknown if benzoyl peroxide is excreted in breast milk.
Birth Control Pills Pregnancy And Lactation Text: This medication should be avoided if pregnant and for the first 30 days post-partum.
Isotretinoin Pregnancy And Lactation Text: This medication is Pregnancy Category X and is considered extremely dangerous during pregnancy. It is unknown if it is excreted in breast milk.
Detail Level: Zone
High Dose Vitamin A Counseling: Side effects reviewed, pt to contact office should one occur.
Birth Control Pills Counseling: Birth Control Pill Counseling: I discussed with the patient the potential side effects of OCPs including but not limited to increased risk of stroke, heart attack, thrombophlebitis, deep venous thrombosis, hepatic adenomas, breast changes, GI upset, headaches, and depression.  The patient verbalized understanding of the proper use and possible adverse effects of OCPs. All of the patient's questions and concerns were addressed.
Doxycycline Counseling:  Patient counseled regarding possible photosensitivity and increased risk for sunburn.  Patient instructed to avoid sunlight, if possible.  When exposed to sunlight, patients should wear protective clothing, sunglasses, and sunscreen.  The patient was instructed to call the office immediately if the following severe adverse effects occur:  hearing changes, easy bruising/bleeding, severe headache, or vision changes.  The patient verbalized understanding of the proper use and possible adverse effects of doxycycline.  All of the patient's questions and concerns were addressed.
Winlevi Pregnancy And Lactation Text: This medication is considered safe during pregnancy and breastfeeding.
Topical Clindamycin Pregnancy And Lactation Text: This medication is Pregnancy Category B and is considered safe during pregnancy. It is unknown if it is excreted in breast milk.
Isotretinoin Counseling: Patient should get monthly blood tests, not donate blood, not drive at night if vision affected, not share medication, and not undergo elective surgery for 6 months after tx completed. Side effects reviewed, pt to contact office should one occur.
Doxycycline Pregnancy And Lactation Text: This medication is Pregnancy Category D and not consider safe during pregnancy. It is also excreted in breast milk but is considered safe for shorter treatment courses.
High Dose Vitamin A Pregnancy And Lactation Text: High dose vitamin A therapy is contraindicated during pregnancy and breast feeding.
Medical Necessity Information: LCD Guidelines vary from payer to payer. Please check with your payer's policy to determine medical necessity.
Patient Specific Counseling (Will Not Stick From Patient To Patient): **if any worsening symptoms advised to notify clinic and/or consider consulting with PCP for lab testing as well
Medical Necessity Clause: Botulinum toxin hyperhidrosis therapy is medically necessary because
Detail Level: Detailed

## 2024-03-12 NOTE — PROCEDURE: PRESCRIPTION MEDICATION MANAGEMENT
Render In Strict Bullet Format?: No
Initiate Treatment: Spironolactone 100 mg take two tablets once daily. Start with one tablet daily. Increase if tolerated.
Detail Level: Zone
Initiate Treatment: Glycopyrrolate 2 mg PO once daily
Plan: Can consider increasing to BID dosing as previously discussed, although pt prefers to just take medication once daily

## 2024-03-13 ENCOUNTER — RX ONLY (RX ONLY)
Age: 39
End: 2024-03-13

## 2024-03-13 RX ORDER — SPIRONOLACTONE 100 MG/1
TABLET, FILM COATED ORAL
Qty: 180 | Refills: 1 | Status: ERX

## 2024-05-13 ENCOUNTER — OFFICE VISIT (OUTPATIENT)
Dept: OBGYN | Facility: CLINIC | Age: 39
End: 2024-05-13
Payer: COMMERCIAL

## 2024-05-13 VITALS
DIASTOLIC BLOOD PRESSURE: 84 MMHG | HEART RATE: 88 BPM | WEIGHT: 177.3 LBS | SYSTOLIC BLOOD PRESSURE: 130 MMHG | HEIGHT: 66 IN | BODY MASS INDEX: 28.49 KG/M2 | OXYGEN SATURATION: 98 %

## 2024-05-13 DIAGNOSIS — Z12.4 CERVICAL CANCER SCREENING: ICD-10-CM

## 2024-05-13 DIAGNOSIS — N93.9 ABNORMAL UTERINE BLEEDING (AUB): Primary | ICD-10-CM

## 2024-05-13 LAB
ESTRADIOL SERPL-MCNC: 178 PG/ML
FSH SERPL IRP2-ACNC: 4.7 MIU/ML
PROLACTIN SERPL 3RD IS-MCNC: 9 NG/ML (ref 5–23)
TSH SERPL DL<=0.005 MIU/L-ACNC: 1.33 UIU/ML (ref 0.3–4.2)

## 2024-05-13 PROCEDURE — 84146 ASSAY OF PROLACTIN: CPT

## 2024-05-13 PROCEDURE — 83001 ASSAY OF GONADOTROPIN (FSH): CPT

## 2024-05-13 PROCEDURE — 36415 COLL VENOUS BLD VENIPUNCTURE: CPT

## 2024-05-13 PROCEDURE — 99203 OFFICE O/P NEW LOW 30 MIN: CPT

## 2024-05-13 PROCEDURE — 87624 HPV HI-RISK TYP POOLED RSLT: CPT

## 2024-05-13 PROCEDURE — 82670 ASSAY OF TOTAL ESTRADIOL: CPT

## 2024-05-13 PROCEDURE — 84443 ASSAY THYROID STIM HORMONE: CPT

## 2024-05-13 PROCEDURE — G0145 SCR C/V CYTO,THINLAYER,RESCR: HCPCS

## 2024-05-13 RX ORDER — MULTIVITAMIN
1 TABLET ORAL DAILY
COMMUNITY
Start: 2023-11-08

## 2024-05-13 RX ORDER — BUSPIRONE HYDROCHLORIDE 5 MG/1
TABLET ORAL
COMMUNITY
Start: 2024-02-14

## 2024-05-13 RX ORDER — OFLOXACIN 3 MG/ML
SOLUTION/ DROPS OPHTHALMIC
COMMUNITY
Start: 2023-02-13

## 2024-05-13 NOTE — PATIENT INSTRUCTIONS
If you have labs or imaging done, the results will automatically release in [employer] without an interpretation.  Your health care professional will review those results and send an interpretation with recommendations as soon as possible, but this may be 1-3 business days.    If you have any questions regarding your visit, please contact your care team.     [facility]: [phone]  [facility] CLINIC HOURS TELEPHONE NUMBER   Shelly Ibarra, JANIS Lange-TYLER Johnson-TYLER Bermudez-Surgery Scheduler  Debbie-       Monday- [place]  8:00 am-4:00 pm    Tuesday- [place]  8:00 am-4:00 pm    Wednesday- [place] 8:00 am-4:00 pm    Thursday- [place] 8:00 am-4:00 pm    Friday- [place]  8:00 am-4:00 pm [facility]  [address] [place]  [place] [address]  PH: [phone]  Fax: [phone]    Imaging Scheduling all locations  PH: [phone]     [facility]  [address] [place] [address] [address].  [place], [place] [address] [phone]    [facility]  [address] [place] [address]  PH: [phone]     **Surgeries** Our Surgery Schedulers will contact you to schedule. If you do not receive a call within 3 business days, please call [phone].  Urgent Care locations:  [facility]       Monday-Friday   10 am - 8 pm    Saturday and Sunday   9 am - 5 pm   [phone]   If you need a medication refill, please contact your pharmacy. Please allow 3 business days for your refill to be completed.  As always, Thank you for trusting us with your healthcare needs!

## 2024-05-13 NOTE — PROGRESS NOTES
Subjective:  Bernice is a 38 year old   is here today with the following concerns:    AUB: patient reports hx of regular, monthly cycles that typically lased 5-6 days. For the past ~year she has had a slight increase in bleeding days, sometimes up to 10 days long. This past month, she has had 3 bleeding episodes. The first episode, being when she was due for her menses, appeared to be normal menstrual cycle. 5 days after this bleeding stopped, she had a return in bleeding that lasted the same length has her period. A few days later she had a return in bleeding. She reports dull cramping with the periods and increased bleeding amount with more clots than she previously had. She is having night sweats and hot flashes and reports her mother went into early menopause as well. She also reports 1 year of increased fatigue. She states she has some dull cramping with deep penetration during SIC which is new within the past year as well. She denies any abnormal discharge or odor and declines STI testing. Denies fevers. No current use of hormonal birth control.  Pap: due today. Last pap  NIL, neg HPV    ROS: Pertinent ROS as above.    Medical history  OB History    Para Term  AB Living   2 2 2 0 0 2   SAB IAB Ectopic Multiple Live Births   0 0 0 0 2      # Outcome Date GA Lbr George/2nd Weight Sex Type Anes PTL Lv   2 Term 20 38w3d 06:54 / 00:19 3.55 kg (7 lb 13.2 oz) M Vag-Spont EPI N DAVID      Complications: Labor and delivery complicated by meconium in amniotic fluid      Name: SETH SHIRLEY      Apgar1: 7  Apgar5: 9   1 Term 17 39w0d 19:35 / 00:49 3.941 kg (8 lb 11 oz) M Vag-Spont EPI N DAVID      Name: CASPERBABY BOY      Apgar1: 7  Apgar5: 8      Past Medical History:   Diagnosis Date    Depression with anxiety 3/28/2014    Depressive disorder 10/1/2017    Hypertension 2016      Past Surgical History:   Procedure Laterality Date    EYE SURGERY  19    NO HISTORY OF  "SURGERY       ALL/Meds: Her medication and allergy histories were reviewed and are documented in their appropriate chart areas.    SH: Reviewed and documented in the appropriate area of the chart.  FH:  Her family history is reviewed and updated in the chart, today.  PMH: Her past medical, surgical, and obstetric histories were reviewed and updated today in the appropriate chart areas.    Objective:  PE: /84 (BP Location: Right arm, Patient Position: Sitting, Cuff Size: Adult Regular)   Pulse 88   Ht 1.672 m (5' 5.83\")   Wt 80.4 kg (177 lb 4.8 oz)   LMP 2024 (Approximate)   SpO2 98%   BMI 28.77 kg/m    Body mass index is 28.77 kg/m .    Pertinent Physical exam findings:    GENERAL: Active, alert, in no acute distress.  SKIN: Clear. No significant rash, abnormal pigmentation or lesions  MS: no gross musculoskeletal defects noted, no edema  PSYCH: Age-appropriate alertness and orientation    Pelvic:       - Ext: Vulva and perineum are normal without lesion, mass or discharge        - Urethra: normal without discharge or scarring        - Bladder: no tenderness, no masses       - Vagina: rugated and blood in vault       - Cervix: normal and multiparous    A/P:  Bernice Garcia is a 38 year old  here today with the following concerns:  (N93.9) Abnormal uterine bleeding (AUB)  (primary encounter diagnosis)  Comment: Informed that we can obtain TVUS first, but she may need to return to clinic for EMB depending on results. Also discussed that her FSH/E2 may be normal or abnormal and this does not necessarily change plan of care but is more rather informative. Unknown etiology at this time but differentials include fibroids, polyps, POI, adenomyosis, malignancy. She chooses to proceed with labs and TVUS at this time.  Plan: Pap Screen with HPV - recommended age 30 - 65         years, TSH with free T4 reflex, Follicle         stimulating hormone, Estradiol, Prolactin, US         Pelvic Complete " with Transvaginal          (Z12.4) Cervical cancer screening  Plan: Pap Screen with HPV - recommended age 30 - 65         years          KYLE Torres CNP

## 2024-05-16 LAB
BKR LAB AP GYN ADEQUACY: NORMAL
BKR LAB AP GYN INTERPRETATION: NORMAL
BKR LAB AP HPV REFLEX: NORMAL
BKR LAB AP PREVIOUS ABNORMAL: NORMAL
PATH REPORT.COMMENTS IMP SPEC: NORMAL
PATH REPORT.COMMENTS IMP SPEC: NORMAL
PATH REPORT.RELEVANT HX SPEC: NORMAL

## 2024-05-22 LAB
HPV HR 12 DNA CVX QL NAA+PROBE: NEGATIVE
HPV16 DNA CVX QL NAA+PROBE: NEGATIVE
HPV18 DNA CVX QL NAA+PROBE: NEGATIVE
HUMAN PAPILLOMA VIRUS FINAL DIAGNOSIS: NORMAL

## 2024-05-23 ENCOUNTER — ANCILLARY PROCEDURE (OUTPATIENT)
Dept: ULTRASOUND IMAGING | Facility: CLINIC | Age: 39
End: 2024-05-23
Payer: COMMERCIAL

## 2024-05-23 DIAGNOSIS — N93.9 ABNORMAL UTERINE BLEEDING (AUB): ICD-10-CM

## 2024-05-23 PROCEDURE — 76830 TRANSVAGINAL US NON-OB: CPT

## 2024-05-23 PROCEDURE — 76856 US EXAM PELVIC COMPLETE: CPT

## 2024-12-18 ENCOUNTER — OFFICE VISIT (OUTPATIENT)
Dept: FAMILY MEDICINE | Facility: CLINIC | Age: 39
End: 2024-12-18
Payer: COMMERCIAL

## 2024-12-18 VITALS
TEMPERATURE: 97.3 F | BODY MASS INDEX: 28.61 KG/M2 | RESPIRATION RATE: 20 BRPM | SYSTOLIC BLOOD PRESSURE: 129 MMHG | DIASTOLIC BLOOD PRESSURE: 87 MMHG | OXYGEN SATURATION: 99 % | WEIGHT: 178 LBS | HEIGHT: 66 IN | HEART RATE: 94 BPM

## 2024-12-18 DIAGNOSIS — T20.22XA PARTIAL THICKNESS BURN OF LIP, INITIAL ENCOUNTER: Primary | ICD-10-CM

## 2024-12-18 PROCEDURE — 99213 OFFICE O/P EST LOW 20 MIN: CPT | Performed by: FAMILY MEDICINE

## 2024-12-18 RX ORDER — MUPIROCIN 20 MG/G
OINTMENT TOPICAL 3 TIMES DAILY
Qty: 15 G | Refills: 1 | Status: SHIPPED | OUTPATIENT
Start: 2024-12-18 | End: 2024-12-28

## 2024-12-18 RX ORDER — SULFAMETHOXAZOLE AND TRIMETHOPRIM 800; 160 MG/1; MG/1
1 TABLET ORAL 2 TIMES DAILY
Qty: 20 TABLET | Refills: 0 | Status: SHIPPED | OUTPATIENT
Start: 2024-12-18 | End: 2024-12-28

## 2024-12-18 ASSESSMENT — PAIN SCALES - GENERAL: PAINLEVEL_OUTOF10: EXTREME PAIN (8)

## 2024-12-18 NOTE — PATIENT INSTRUCTIONS
At [facility], we strive to deliver an exceptional experience to you, every time we see you. If you receive a survey, please let us know what we are doing well and/or what we could improve upon, as we do value your feedback.  If you have MyChart, you can expect to receive results automatically within 24 hours of their completion.  Your provider will send a note interpreting your results as well.   If you do not have MyChart, you should receive your results in about a week by mail.    Your care team:                            Family Medicine Internal Medicine   MD Jm Johnson, MD Soumya Antoine, MD Thiago Mota, MD Taniya Garland, PATamyC    Helio Johnson, MD Pediatrics   Sridevi Bella, MD Nasra Rivers, MD Lara Bernstein, APRN CNP Tiffanie Kennedy APRN CNP   MD Ria Motley, MD Sydni Shah, CNP     Brennen Bridges, CNP Same-Day Provider (No follow-up visits)   KYLE Briones, DNP Melany Ward, KYLE Puentes, FNP, BC SALENA RubioC     Clinic hours: Monday - Thursday 7 am-6 pm; Fridays 7 am-5 pm.   Urgent care: Monday - Friday 10 am- 8 pm; Saturday and Sunday 9 am-5 pm.    Clinic: [phone]       [facility]: Monday - Thursday 8 am - 7 pm; Friday 8 am - 6 pm  [facility]: [phone]

## 2024-12-18 NOTE — PROGRESS NOTES
"    Subjective   Bernice Garcia is a 39 year old, presenting for the following health issues:  Mouth/Lip Problem      12/18/2024    12:50 PM   Additional Questions   Roomed by Clifton   Accompanied by Self     Mouth/Lip Problem    History of Present Illness       Reason for visit:  Burn on lip that has turned into an infection  Symptom onset:  1-3 days ago  Symptoms include:  Swelling, itch, blisters, pain  Symptom intensity:  Severe  Symptom progression:  Worsening  Had these symptoms before:  No  What makes it worse:  Touching it  What makes it better:  Cold compressions She is missing 2 dose(s) of medications per week.  She is not taking prescribed medications regularly due to remembering to take.     Patient stated she ate a pizza that had very hot cheese and tomato sauce.        Review of Systems  Constitutional, HEENT, cardiovascular, pulmonary, GI, , musculoskeletal, neuro, skin, endocrine and psych systems are negative, except as otherwise noted.      Objective    /87 (BP Location: Left arm, Patient Position: Chair, Cuff Size: Adult Large)   Pulse 94   Temp 97.3  F (36.3  C) (Temporal)   Resp 20   Ht 1.672 m (5' 5.83\")   Wt 80.7 kg (178 lb)   SpO2 99%   BMI 28.88 kg/m    Body mass index is 28.88 kg/m .  Physical Exam   EXAM:  Constitutional: healthy, alert, and no distress   SKIN: lower lip swelling with blisters, tender      A/P:  (T20.22XA) Partial thickness burn of lip, initial encounter  (primary encounter diagnosis)  Comment:   Plan: sulfamethoxazole-trimethoprim (BACTRIM DS)         800-160 MG tablet, mupirocin (BACTROBAN) 2 %         external ointment        Has secondary infection. Will cover with oral bactrim and topical mupirocin for 10 days. If no improvements or worsening, patient will follow up in clinic for recheck.            Signed Electronically by: Helio Johnson MD, MD    "

## 2025-03-09 ENCOUNTER — HEALTH MAINTENANCE LETTER (OUTPATIENT)
Age: 40
End: 2025-03-09

## 2025-04-25 PROBLEM — F33.1 MODERATE EPISODE OF RECURRENT MAJOR DEPRESSIVE DISORDER (H): Status: ACTIVE | Noted: 2023-11-08

## 2025-04-28 ENCOUNTER — PATIENT OUTREACH (OUTPATIENT)
Dept: CARE COORDINATION | Facility: CLINIC | Age: 40
End: 2025-04-28
Payer: COMMERCIAL

## 2025-04-30 ENCOUNTER — PATIENT OUTREACH (OUTPATIENT)
Dept: CARE COORDINATION | Facility: CLINIC | Age: 40
End: 2025-04-30
Payer: COMMERCIAL

## 2025-06-18 ENCOUNTER — MYC REFILL (OUTPATIENT)
Dept: FAMILY MEDICINE | Facility: CLINIC | Age: 40
End: 2025-06-18
Payer: COMMERCIAL

## 2025-06-18 DIAGNOSIS — F41.1 GENERALIZED ANXIETY DISORDER: ICD-10-CM

## 2025-06-18 DIAGNOSIS — F33.1 MODERATE EPISODE OF RECURRENT MAJOR DEPRESSIVE DISORDER (H): ICD-10-CM

## 2025-06-18 RX ORDER — FLUOXETINE HYDROCHLORIDE 40 MG/1
CAPSULE ORAL
Qty: 90 CAPSULE | Refills: 3 | Status: CANCELLED | OUTPATIENT
Start: 2025-06-18

## 2025-06-18 RX ORDER — FLUOXETINE 10 MG/1
CAPSULE ORAL
Qty: 90 CAPSULE | Refills: 3 | Status: CANCELLED | OUTPATIENT
Start: 2025-06-18

## 2025-06-18 RX ORDER — BUPROPION HYDROCHLORIDE 150 MG/1
TABLET ORAL
Qty: 90 TABLET | Refills: 3 | Status: CANCELLED | OUTPATIENT
Start: 2025-06-18

## 2025-06-19 NOTE — TELEPHONE ENCOUNTER
Prescription on file at [facility].     Mary Guthrie RN BSN  [facility]

## 2025-06-30 ENCOUNTER — MYC MEDICAL ADVICE (OUTPATIENT)
Dept: FAMILY MEDICINE | Facility: CLINIC | Age: 40
End: 2025-06-30
Payer: COMMERCIAL

## 2025-06-30 DIAGNOSIS — F33.1 MODERATE EPISODE OF RECURRENT MAJOR DEPRESSIVE DISORDER (H): ICD-10-CM

## 2025-06-30 RX ORDER — BUPROPION HYDROCHLORIDE 300 MG/1
300 TABLET ORAL EVERY MORNING
Qty: 90 TABLET | Refills: 2 | Status: SHIPPED | OUTPATIENT
Start: 2025-06-30